# Patient Record
Sex: FEMALE | Race: WHITE | Employment: PART TIME | URBAN - NONMETROPOLITAN AREA
[De-identification: names, ages, dates, MRNs, and addresses within clinical notes are randomized per-mention and may not be internally consistent; named-entity substitution may affect disease eponyms.]

---

## 2017-07-17 ENCOUNTER — OFFICE VISIT (OUTPATIENT)
Dept: PHYSICAL MEDICINE AND REHAB | Age: 47
End: 2017-07-17
Payer: COMMERCIAL

## 2017-07-17 VITALS
HEART RATE: 66 BPM | HEIGHT: 64 IN | WEIGHT: 259 LBS | SYSTOLIC BLOOD PRESSURE: 124 MMHG | BODY MASS INDEX: 44.22 KG/M2 | DIASTOLIC BLOOD PRESSURE: 74 MMHG

## 2017-07-17 DIAGNOSIS — G35 MULTIPLE SCLEROSIS (HCC): Primary | ICD-10-CM

## 2017-07-17 PROCEDURE — 99213 OFFICE O/P EST LOW 20 MIN: CPT | Performed by: PSYCHIATRY & NEUROLOGY

## 2017-07-17 RX ORDER — GLATIRAMER 40 MG/ML
INJECTION, SOLUTION SUBCUTANEOUS
Qty: 36 SYRINGE | Refills: 3 | Status: SHIPPED | OUTPATIENT
Start: 2017-07-17 | End: 2018-07-09 | Stop reason: SDUPTHER

## 2017-11-16 ENCOUNTER — HOSPITAL ENCOUNTER (OUTPATIENT)
Dept: MAMMOGRAPHY | Age: 47
Discharge: HOME | End: 2017-11-16
Payer: COMMERCIAL

## 2017-11-16 DIAGNOSIS — Z12.39 BREAST SCREENING: ICD-10-CM

## 2017-11-16 PROCEDURE — 77063 BREAST TOMOSYNTHESIS BI: CPT

## 2017-11-29 ENCOUNTER — TELEPHONE (OUTPATIENT)
Dept: NEUROLOGY | Age: 47
End: 2017-11-29

## 2017-11-29 DIAGNOSIS — G35 MULTIPLE SCLEROSIS (HCC): Primary | ICD-10-CM

## 2017-11-29 NOTE — TELEPHONE ENCOUNTER
Patient called stating in the past you had offered her a referral for a sleep study. She said at that time she was not ready to proceed, but she feels she would like to go ahead and get it done now. Ok to do referral for sleep study? Please advise.

## 2018-03-16 ENCOUNTER — HOSPITAL ENCOUNTER (OUTPATIENT)
Age: 48
Discharge: HOME | End: 2018-03-16
Payer: COMMERCIAL

## 2018-03-16 LAB
AMORPHOUS: ABNORMAL
BACTERIA: ABNORMAL
BILIRUBIN URINE: NEGATIVE
BLOOD, URINE: NEGATIVE
CASTS UA: ABNORMAL /LPF
CHARACTER, URINE: CLEAR
COLOR: YELLOW
CRYSTALS, UA: ABNORMAL
EPITHELIAL CELLS, UA: ABNORMAL /HPF
GLUCOSE, URINE: 100 MG/DL
KETONES, URINE: NEGATIVE
LEUKOCYTE ESTERASE, URINE: ABNORMAL
MUCUS: ABNORMAL
NITRITE, URINE: NEGATIVE
PH UA: 6 (ref 5–9)
PROTEIN UA: NEGATIVE MG/DL
RBC UA: ABNORMAL /HPF
REFLEX TO URINE C & S: ABNORMAL
SPECIFIC GRAVITY UA: 1.02 (ref 1–1.03)
UROBILINOGEN, URINE: 0.2 EU/DL (ref 0–1)
WBC UA: ABNORMAL /HPF

## 2018-03-16 PROCEDURE — 81001 URINALYSIS AUTO W/SCOPE: CPT

## 2018-03-16 PROCEDURE — 87086 URINE CULTURE/COLONY COUNT: CPT

## 2018-03-17 LAB
ORGANISM: ABNORMAL
URINE CULTURE REFLEX: ABNORMAL

## 2018-05-29 RX ORDER — LISINOPRIL 20 MG/1
10 TABLET ORAL DAILY
COMMUNITY
End: 2022-04-25 | Stop reason: DRUGHIGH

## 2018-05-31 ENCOUNTER — HOSPITAL ENCOUNTER (OUTPATIENT)
Age: 48
Discharge: HOME | End: 2018-05-31
Payer: COMMERCIAL

## 2018-05-31 LAB
BASOPHILS # BLD: 0.8 %
BASOPHILS ABSOLUTE: 0.1 THOU/MM3 (ref 0–0.1)
EKG ATRIAL RATE: 60 BPM
EKG P AXIS: 58 DEGREES
EKG P-R INTERVAL: 140 MS
EKG Q-T INTERVAL: 420 MS
EKG QRS DURATION: 110 MS
EKG QTC CALCULATION (BAZETT): 420 MS
EKG R AXIS: 19 DEGREES
EKG T AXIS: 15 DEGREES
EKG VENTRICULAR RATE: 60 BPM
EOSINOPHIL # BLD: 4.8 %
EOSINOPHILS ABSOLUTE: 0.4 THOU/MM3 (ref 0–0.4)
HCT VFR BLD CALC: 41.1 % (ref 37–47)
HEMOGLOBIN: 13.9 GM/DL (ref 12–16)
LYMPHOCYTES # BLD: 34 %
LYMPHOCYTES ABSOLUTE: 2.5 THOU/MM3 (ref 1–4.8)
MCH RBC QN AUTO: 28.9 PG (ref 27–31)
MCHC RBC AUTO-ENTMCNC: 33.9 GM/DL (ref 33–37)
MCV RBC AUTO: 85.3 FL (ref 81–99)
MONOCYTES # BLD: 9.6 %
MONOCYTES ABSOLUTE: 0.7 THOU/MM3 (ref 0.4–1.3)
NUCLEATED RED BLOOD CELLS: 0 /100 WBC
PDW BLD-RTO: 13.7 % (ref 11.5–14.5)
PLATELET # BLD: 285 THOU/MM3 (ref 130–400)
PMV BLD AUTO: 8.8 FL (ref 7.4–10.4)
POTASSIUM SERPL-SCNC: 3.9 MEQ/L (ref 3.5–5.2)
RBC # BLD: 4.81 MILL/MM3 (ref 4.2–5.4)
SEG NEUTROPHILS: 50.8 %
SEGMENTED NEUTROPHILS ABSOLUTE COUNT: 3.7 THOU/MM3 (ref 1.8–7.7)
WBC # BLD: 7.3 THOU/MM3 (ref 4.8–10.8)

## 2018-05-31 PROCEDURE — 36415 COLL VENOUS BLD VENIPUNCTURE: CPT

## 2018-05-31 PROCEDURE — 85025 COMPLETE CBC W/AUTO DIFF WBC: CPT

## 2018-05-31 PROCEDURE — 84132 ASSAY OF SERUM POTASSIUM: CPT

## 2018-05-31 PROCEDURE — 93005 ELECTROCARDIOGRAM TRACING: CPT | Performed by: OBSTETRICS & GYNECOLOGY

## 2018-06-05 ENCOUNTER — ANESTHESIA EVENT (OUTPATIENT)
Dept: OPERATING ROOM | Age: 48
End: 2018-06-05
Payer: COMMERCIAL

## 2018-06-05 ENCOUNTER — ANESTHESIA (OUTPATIENT)
Dept: OPERATING ROOM | Age: 48
End: 2018-06-05
Payer: COMMERCIAL

## 2018-06-05 ENCOUNTER — APPOINTMENT (OUTPATIENT)
Dept: GENERAL RADIOLOGY | Age: 48
End: 2018-06-05
Payer: COMMERCIAL

## 2018-06-05 ENCOUNTER — HOSPITAL ENCOUNTER (OUTPATIENT)
Age: 48
Setting detail: OUTPATIENT SURGERY
Discharge: ANOTHER HOSPITAL | End: 2018-06-05
Attending: OBSTETRICS & GYNECOLOGY | Admitting: OBSTETRICS & GYNECOLOGY
Payer: COMMERCIAL

## 2018-06-05 ENCOUNTER — HOSPITAL ENCOUNTER (EMERGENCY)
Age: 48
Discharge: HOME | End: 2018-06-05
Attending: INTERNAL MEDICINE
Payer: COMMERCIAL

## 2018-06-05 VITALS
RESPIRATION RATE: 14 BRPM | SYSTOLIC BLOOD PRESSURE: 159 MMHG | DIASTOLIC BLOOD PRESSURE: 88 MMHG | HEART RATE: 70 BPM | BODY MASS INDEX: 42.68 KG/M2 | OXYGEN SATURATION: 99 % | HEIGHT: 64 IN | WEIGHT: 250 LBS | TEMPERATURE: 97.6 F

## 2018-06-05 VITALS
TEMPERATURE: 97 F | RESPIRATION RATE: 9 BRPM | DIASTOLIC BLOOD PRESSURE: 75 MMHG | WEIGHT: 251 LBS | HEART RATE: 89 BPM | SYSTOLIC BLOOD PRESSURE: 139 MMHG | BODY MASS INDEX: 42.85 KG/M2 | HEIGHT: 64 IN | OXYGEN SATURATION: 100 %

## 2018-06-05 VITALS
DIASTOLIC BLOOD PRESSURE: 85 MMHG | SYSTOLIC BLOOD PRESSURE: 157 MMHG | RESPIRATION RATE: 14 BRPM | OXYGEN SATURATION: 96 % | TEMPERATURE: 97.5 F

## 2018-06-05 DIAGNOSIS — N95.0 POSTMENOPAUSAL BLEEDING: Primary | ICD-10-CM

## 2018-06-05 DIAGNOSIS — R00.1 BRADYCARDIA: Primary | ICD-10-CM

## 2018-06-05 LAB
ANION GAP SERPL CALCULATED.3IONS-SCNC: 15 MEQ/L (ref 8–16)
BASOPHILS # BLD: 0.5 %
BASOPHILS ABSOLUTE: 0 THOU/MM3 (ref 0–0.1)
BUN BLDV-MCNC: 9 MG/DL (ref 7–22)
CALCIUM SERPL-MCNC: 8.5 MG/DL (ref 8.5–10.5)
CHLORIDE BLD-SCNC: 101 MEQ/L (ref 98–111)
CO2: 23 MEQ/L (ref 23–33)
CREAT SERPL-MCNC: 0.5 MG/DL (ref 0.4–1.2)
EKG ATRIAL RATE: 66 BPM
EKG ATRIAL RATE: 78 BPM
EKG P AXIS: 44 DEGREES
EKG P AXIS: 62 DEGREES
EKG P-R INTERVAL: 148 MS
EKG P-R INTERVAL: 154 MS
EKG Q-T INTERVAL: 424 MS
EKG Q-T INTERVAL: 424 MS
EKG QRS DURATION: 112 MS
EKG QRS DURATION: 112 MS
EKG QTC CALCULATION (BAZETT): 444 MS
EKG QTC CALCULATION (BAZETT): 483 MS
EKG R AXIS: 14 DEGREES
EKG R AXIS: 22 DEGREES
EKG T AXIS: 13 DEGREES
EKG VENTRICULAR RATE: 66 BPM
EKG VENTRICULAR RATE: 78 BPM
EOSINOPHIL # BLD: 3.4 %
EOSINOPHILS ABSOLUTE: 0.2 THOU/MM3 (ref 0–0.4)
GFR SERPL CREATININE-BSD FRML MDRD: > 90 ML/MIN/1.73M2
GLUCOSE BLD-MCNC: 165 MG/DL (ref 70–108)
HCT VFR BLD CALC: 40.2 % (ref 37–47)
HEMOGLOBIN: 13.9 GM/DL (ref 12–16)
INR BLD: 1.03 (ref 0.85–1.13)
LYMPHOCYTES # BLD: 18.9 %
LYMPHOCYTES ABSOLUTE: 1.2 THOU/MM3 (ref 1–4.8)
MCH RBC QN AUTO: 29.1 PG (ref 27–31)
MCHC RBC AUTO-ENTMCNC: 34.5 GM/DL (ref 33–37)
MCV RBC AUTO: 84.2 FL (ref 81–99)
MONOCYTES # BLD: 3.3 %
MONOCYTES ABSOLUTE: 0.2 THOU/MM3 (ref 0.4–1.3)
NUCLEATED RED BLOOD CELLS: 0 /100 WBC
OSMOLALITY CALCULATION: 279.9 MOSMOL/KG (ref 275–300)
PDW BLD-RTO: 14.1 % (ref 11.5–14.5)
PLATELET # BLD: 240 THOU/MM3 (ref 130–400)
PMV BLD AUTO: 7.8 FL (ref 7.4–10.4)
POTASSIUM SERPL-SCNC: 4.3 MEQ/L (ref 3.5–5.2)
RBC # BLD: 4.78 MILL/MM3 (ref 4.2–5.4)
SEG NEUTROPHILS: 73.9 %
SEGMENTED NEUTROPHILS ABSOLUTE COUNT: 4.9 THOU/MM3 (ref 1.8–7.7)
SODIUM BLD-SCNC: 139 MEQ/L (ref 135–145)
T4 FREE: 0.94 NG/DL (ref 0.93–1.76)
TROPONIN T: < 0.01 NG/ML
TSH SERPL DL<=0.05 MIU/L-ACNC: 8.36 UIU/ML (ref 0.4–4.2)
WBC # BLD: 6.6 THOU/MM3 (ref 4.8–10.8)

## 2018-06-05 PROCEDURE — 7100000001 HC PACU RECOVERY - ADDTL 15 MIN: Performed by: OBSTETRICS & GYNECOLOGY

## 2018-06-05 PROCEDURE — 84484 ASSAY OF TROPONIN QUANT: CPT

## 2018-06-05 PROCEDURE — 3700000000 HC ANESTHESIA ATTENDED CARE: Performed by: OBSTETRICS & GYNECOLOGY

## 2018-06-05 PROCEDURE — 93010 ELECTROCARDIOGRAM REPORT: CPT | Performed by: INTERNAL MEDICINE

## 2018-06-05 PROCEDURE — 3600000013 HC SURGERY LEVEL 3 ADDTL 15MIN: Performed by: OBSTETRICS & GYNECOLOGY

## 2018-06-05 PROCEDURE — 6360000002 HC RX W HCPCS: Performed by: NURSE ANESTHETIST, CERTIFIED REGISTERED

## 2018-06-05 PROCEDURE — 36415 COLL VENOUS BLD VENIPUNCTURE: CPT

## 2018-06-05 PROCEDURE — 6370000000 HC RX 637 (ALT 250 FOR IP): Performed by: INTERNAL MEDICINE

## 2018-06-05 PROCEDURE — 3600000003 HC SURGERY LEVEL 3 BASE: Performed by: OBSTETRICS & GYNECOLOGY

## 2018-06-05 PROCEDURE — 3700000001 HC ADD 15 MINUTES (ANESTHESIA): Performed by: OBSTETRICS & GYNECOLOGY

## 2018-06-05 PROCEDURE — 6360000002 HC RX W HCPCS

## 2018-06-05 PROCEDURE — 85025 COMPLETE CBC W/AUTO DIFF WBC: CPT

## 2018-06-05 PROCEDURE — 2500000003 HC RX 250 WO HCPCS: Performed by: NURSE ANESTHETIST, CERTIFIED REGISTERED

## 2018-06-05 PROCEDURE — 84439 ASSAY OF FREE THYROXINE: CPT

## 2018-06-05 PROCEDURE — 85610 PROTHROMBIN TIME: CPT

## 2018-06-05 PROCEDURE — 99284 EMERGENCY DEPT VISIT MOD MDM: CPT

## 2018-06-05 PROCEDURE — 2580000003 HC RX 258: Performed by: NURSE ANESTHETIST, CERTIFIED REGISTERED

## 2018-06-05 PROCEDURE — 93005 ELECTROCARDIOGRAM TRACING: CPT | Performed by: ANESTHESIOLOGY

## 2018-06-05 PROCEDURE — 88305 TISSUE EXAM BY PATHOLOGIST: CPT

## 2018-06-05 PROCEDURE — 93005 ELECTROCARDIOGRAM TRACING: CPT | Performed by: INTERNAL MEDICINE

## 2018-06-05 PROCEDURE — 80048 BASIC METABOLIC PNL TOTAL CA: CPT

## 2018-06-05 PROCEDURE — 7100000000 HC PACU RECOVERY - FIRST 15 MIN: Performed by: OBSTETRICS & GYNECOLOGY

## 2018-06-05 PROCEDURE — 71045 X-RAY EXAM CHEST 1 VIEW: CPT

## 2018-06-05 PROCEDURE — 84443 ASSAY THYROID STIM HORMONE: CPT

## 2018-06-05 RX ORDER — ONDANSETRON 2 MG/ML
8 INJECTION INTRAMUSCULAR; INTRAVENOUS EVERY 8 HOURS PRN
Status: DISCONTINUED | OUTPATIENT
Start: 2018-06-05 | End: 2018-06-05 | Stop reason: HOSPADM

## 2018-06-05 RX ORDER — FENTANYL CITRATE 50 UG/ML
50 INJECTION, SOLUTION INTRAMUSCULAR; INTRAVENOUS EVERY 5 MIN PRN
Status: DISCONTINUED | OUTPATIENT
Start: 2018-06-05 | End: 2018-06-05 | Stop reason: HOSPADM

## 2018-06-05 RX ORDER — SODIUM CHLORIDE, SODIUM LACTATE, POTASSIUM CHLORIDE, CALCIUM CHLORIDE 600; 310; 30; 20 MG/100ML; MG/100ML; MG/100ML; MG/100ML
INJECTION, SOLUTION INTRAVENOUS CONTINUOUS
Status: DISCONTINUED | OUTPATIENT
Start: 2018-06-05 | End: 2018-06-05 | Stop reason: HOSPADM

## 2018-06-05 RX ORDER — PROPOFOL 10 MG/ML
INJECTION, EMULSION INTRAVENOUS PRN
Status: DISCONTINUED | OUTPATIENT
Start: 2018-06-05 | End: 2018-06-05 | Stop reason: SDUPTHER

## 2018-06-05 RX ORDER — HYDROCODONE BITARTRATE AND ACETAMINOPHEN 5; 325 MG/1; MG/1
1 TABLET ORAL ONCE
Status: COMPLETED | OUTPATIENT
Start: 2018-06-05 | End: 2018-06-05

## 2018-06-05 RX ORDER — HYDRALAZINE HYDROCHLORIDE 20 MG/ML
5 INJECTION INTRAMUSCULAR; INTRAVENOUS EVERY 10 MIN PRN
Status: DISCONTINUED | OUTPATIENT
Start: 2018-06-05 | End: 2018-06-05 | Stop reason: HOSPADM

## 2018-06-05 RX ORDER — MEPERIDINE HYDROCHLORIDE 25 MG/ML
12.5 INJECTION INTRAMUSCULAR; INTRAVENOUS; SUBCUTANEOUS EVERY 5 MIN PRN
Status: DISCONTINUED | OUTPATIENT
Start: 2018-06-05 | End: 2018-06-05 | Stop reason: HOSPADM

## 2018-06-05 RX ORDER — KETOROLAC TROMETHAMINE 30 MG/ML
30 INJECTION, SOLUTION INTRAMUSCULAR; INTRAVENOUS ONCE
Status: DISCONTINUED | OUTPATIENT
Start: 2018-06-05 | End: 2018-06-05 | Stop reason: HOSPADM

## 2018-06-05 RX ORDER — HYDROCODONE BITARTRATE AND ACETAMINOPHEN 5; 325 MG/1; MG/1
1 TABLET ORAL EVERY 6 HOURS PRN
Qty: 10 TABLET | Refills: 0 | Status: SHIPPED | OUTPATIENT
Start: 2018-06-05 | End: 2018-06-08

## 2018-06-05 RX ORDER — DIPHENHYDRAMINE HYDROCHLORIDE 50 MG/ML
12.5 INJECTION INTRAMUSCULAR; INTRAVENOUS
Status: DISCONTINUED | OUTPATIENT
Start: 2018-06-05 | End: 2018-06-05 | Stop reason: HOSPADM

## 2018-06-05 RX ORDER — MORPHINE SULFATE 2 MG/ML
2 INJECTION, SOLUTION INTRAMUSCULAR; INTRAVENOUS EVERY 5 MIN PRN
Status: DISCONTINUED | OUTPATIENT
Start: 2018-06-05 | End: 2018-06-05 | Stop reason: HOSPADM

## 2018-06-05 RX ORDER — ONDANSETRON 2 MG/ML
INJECTION INTRAMUSCULAR; INTRAVENOUS PRN
Status: DISCONTINUED | OUTPATIENT
Start: 2018-06-05 | End: 2018-06-05 | Stop reason: SDUPTHER

## 2018-06-05 RX ORDER — ONDANSETRON 2 MG/ML
4 INJECTION INTRAMUSCULAR; INTRAVENOUS
Status: DISCONTINUED | OUTPATIENT
Start: 2018-06-05 | End: 2018-06-05 | Stop reason: HOSPADM

## 2018-06-05 RX ORDER — LABETALOL HYDROCHLORIDE 5 MG/ML
5 INJECTION, SOLUTION INTRAVENOUS EVERY 5 MIN PRN
Status: DISCONTINUED | OUTPATIENT
Start: 2018-06-05 | End: 2018-06-05 | Stop reason: HOSPADM

## 2018-06-05 RX ORDER — MIDAZOLAM HYDROCHLORIDE 1 MG/ML
INJECTION INTRAMUSCULAR; INTRAVENOUS PRN
Status: DISCONTINUED | OUTPATIENT
Start: 2018-06-05 | End: 2018-06-05 | Stop reason: SDUPTHER

## 2018-06-05 RX ORDER — SODIUM CHLORIDE 9 MG/ML
INJECTION, SOLUTION INTRAVENOUS CONTINUOUS PRN
Status: DISCONTINUED | OUTPATIENT
Start: 2018-06-05 | End: 2018-06-05 | Stop reason: SDUPTHER

## 2018-06-05 RX ORDER — LIDOCAINE HYDROCHLORIDE 20 MG/ML
INJECTION, SOLUTION EPIDURAL; INFILTRATION; INTRACAUDAL; PERINEURAL PRN
Status: DISCONTINUED | OUTPATIENT
Start: 2018-06-05 | End: 2018-06-05 | Stop reason: SDUPTHER

## 2018-06-05 RX ORDER — FENTANYL CITRATE 50 UG/ML
INJECTION, SOLUTION INTRAMUSCULAR; INTRAVENOUS PRN
Status: DISCONTINUED | OUTPATIENT
Start: 2018-06-05 | End: 2018-06-05 | Stop reason: SDUPTHER

## 2018-06-05 RX ORDER — ATROPINE SULFATE 1 MG/ML
INJECTION, SOLUTION INTRAMUSCULAR; INTRAVENOUS; SUBCUTANEOUS
Status: COMPLETED
Start: 2018-06-05 | End: 2018-06-05

## 2018-06-05 RX ADMIN — PROPOFOL 30 MG: 10 INJECTION, EMULSION INTRAVENOUS at 12:28

## 2018-06-05 RX ADMIN — HYDROCODONE BITARTRATE AND ACETAMINOPHEN 1 TABLET: 5; 325 TABLET ORAL at 15:42

## 2018-06-05 RX ADMIN — FENTANYL CITRATE 25 MCG: 50 INJECTION INTRAMUSCULAR; INTRAVENOUS at 12:21

## 2018-06-05 RX ADMIN — MIDAZOLAM HYDROCHLORIDE 2 MG: 1 INJECTION, SOLUTION INTRAMUSCULAR; INTRAVENOUS at 12:13

## 2018-06-05 RX ADMIN — PROPOFOL 140 MG: 10 INJECTION, EMULSION INTRAVENOUS at 12:15

## 2018-06-05 RX ADMIN — LIDOCAINE HYDROCHLORIDE 60 MG: 20 INJECTION, SOLUTION EPIDURAL; INFILTRATION; INTRACAUDAL; PERINEURAL at 12:15

## 2018-06-05 RX ADMIN — FENTANYL CITRATE 25 MCG: 50 INJECTION INTRAMUSCULAR; INTRAVENOUS at 12:15

## 2018-06-05 RX ADMIN — ATROPINE SULFATE 0.5 MG: 1 INJECTION, SOLUTION INTRAMUSCULAR; INTRAVENOUS; SUBCUTANEOUS at 12:50

## 2018-06-05 RX ADMIN — ONDANSETRON HYDROCHLORIDE 4 MG: 4 INJECTION, SOLUTION INTRAMUSCULAR; INTRAVENOUS at 12:34

## 2018-06-05 RX ADMIN — SODIUM CHLORIDE: 9 INJECTION, SOLUTION INTRAVENOUS at 12:12

## 2018-06-05 ASSESSMENT — PULMONARY FUNCTION TESTS
PIF_VALUE: 2
PIF_VALUE: 3
PIF_VALUE: 31
PIF_VALUE: 3
PIF_VALUE: 3
PIF_VALUE: 1
PIF_VALUE: 2
PIF_VALUE: 3
PIF_VALUE: 2
PIF_VALUE: 3
PIF_VALUE: 3
PIF_VALUE: 2
PIF_VALUE: 2
PIF_VALUE: 3
PIF_VALUE: 3
PIF_VALUE: 1
PIF_VALUE: 3
PIF_VALUE: 2
PIF_VALUE: 3
PIF_VALUE: 2
PIF_VALUE: 22

## 2018-06-05 ASSESSMENT — PAIN DESCRIPTION - DESCRIPTORS
DESCRIPTORS: CRAMPING
DESCRIPTORS: CRAMPING

## 2018-06-05 ASSESSMENT — ENCOUNTER SYMPTOMS
EYE DISCHARGE: 0
SHORTNESS OF BREATH: 0
RHINORRHEA: 0
WHEEZING: 0
NAUSEA: 0
VOMITING: 0
BACK PAIN: 0
COUGH: 0
DIARRHEA: 0
SORE THROAT: 0
ABDOMINAL PAIN: 1
EYE PAIN: 0

## 2018-06-05 ASSESSMENT — PAIN DESCRIPTION - LOCATION: LOCATION: ABDOMEN;PELVIS

## 2018-06-05 ASSESSMENT — PAIN DESCRIPTION - FREQUENCY: FREQUENCY: CONTINUOUS

## 2018-06-05 ASSESSMENT — PAIN - FUNCTIONAL ASSESSMENT: PAIN_FUNCTIONAL_ASSESSMENT: 0-10

## 2018-06-05 ASSESSMENT — PAIN DESCRIPTION - PAIN TYPE: TYPE: ACUTE PAIN

## 2018-06-05 ASSESSMENT — PAIN SCALES - GENERAL
PAINLEVEL_OUTOF10: 4
PAINLEVEL_OUTOF10: 3
PAINLEVEL_OUTOF10: 4
PAINLEVEL_OUTOF10: 4

## 2018-06-05 ASSESSMENT — PAIN DESCRIPTION - PROGRESSION: CLINICAL_PROGRESSION: GRADUALLY WORSENING

## 2018-06-05 ASSESSMENT — PAIN DESCRIPTION - ONSET: ONSET: ON-GOING

## 2018-06-11 ENCOUNTER — OFFICE VISIT (OUTPATIENT)
Dept: NEUROLOGY | Age: 48
End: 2018-06-11
Payer: COMMERCIAL

## 2018-06-11 VITALS
HEART RATE: 76 BPM | BODY MASS INDEX: 42.95 KG/M2 | DIASTOLIC BLOOD PRESSURE: 74 MMHG | SYSTOLIC BLOOD PRESSURE: 130 MMHG | WEIGHT: 251.6 LBS | HEIGHT: 64 IN

## 2018-06-11 DIAGNOSIS — G35 MULTIPLE SCLEROSIS (HCC): Primary | ICD-10-CM

## 2018-06-11 PROCEDURE — G8417 CALC BMI ABV UP PARAM F/U: HCPCS | Performed by: PSYCHIATRY & NEUROLOGY

## 2018-06-11 PROCEDURE — G8427 DOCREV CUR MEDS BY ELIG CLIN: HCPCS | Performed by: PSYCHIATRY & NEUROLOGY

## 2018-06-11 PROCEDURE — 3017F COLORECTAL CA SCREEN DOC REV: CPT | Performed by: PSYCHIATRY & NEUROLOGY

## 2018-06-11 PROCEDURE — 99213 OFFICE O/P EST LOW 20 MIN: CPT | Performed by: PSYCHIATRY & NEUROLOGY

## 2018-06-11 PROCEDURE — 1036F TOBACCO NON-USER: CPT | Performed by: PSYCHIATRY & NEUROLOGY

## 2018-06-18 ENCOUNTER — HOSPITAL ENCOUNTER (OUTPATIENT)
Dept: NON INVASIVE DIAGNOSTICS | Age: 48
Discharge: HOME | End: 2018-06-18
Payer: COMMERCIAL

## 2018-06-18 PROCEDURE — 93225 XTRNL ECG REC<48 HRS REC: CPT

## 2018-06-18 PROCEDURE — 93226 XTRNL ECG REC<48 HR SCAN A/R: CPT

## 2018-07-09 RX ORDER — GLATIRAMER 40 MG/ML
INJECTION, SOLUTION SUBCUTANEOUS
Qty: 36 SYRINGE | Refills: 3 | Status: SHIPPED | OUTPATIENT
Start: 2018-07-09 | End: 2019-07-05 | Stop reason: SDUPTHER

## 2018-08-22 ENCOUNTER — HOSPITAL ENCOUNTER (OUTPATIENT)
Age: 48
Discharge: HOME | End: 2018-08-22
Payer: COMMERCIAL

## 2018-08-22 LAB
AVERAGE GLUCOSE: 213 MG/DL (ref 70–126)
HBA1C MFR BLD: 9.1 % (ref 4.4–6.4)
T4 FREE: 1.22 NG/DL (ref 0.93–1.76)
TSH SERPL DL<=0.05 MIU/L-ACNC: 3.16 UIU/ML (ref 0.4–4.2)

## 2018-08-22 PROCEDURE — 84443 ASSAY THYROID STIM HORMONE: CPT

## 2018-08-22 PROCEDURE — 83036 HEMOGLOBIN GLYCOSYLATED A1C: CPT

## 2018-08-22 PROCEDURE — 36415 COLL VENOUS BLD VENIPUNCTURE: CPT

## 2018-08-22 PROCEDURE — 84439 ASSAY OF FREE THYROXINE: CPT

## 2018-10-01 ENCOUNTER — OFFICE VISIT (OUTPATIENT)
Dept: INTERNAL MEDICINE CLINIC | Age: 48
End: 2018-10-01
Payer: COMMERCIAL

## 2018-10-01 VITALS — BODY MASS INDEX: 40.55 KG/M2 | WEIGHT: 236.25 LBS

## 2018-10-01 DIAGNOSIS — E11.9 TYPE 2 DIABETES MELLITUS WITHOUT COMPLICATION, WITHOUT LONG-TERM CURRENT USE OF INSULIN (HCC): ICD-10-CM

## 2018-10-01 PROCEDURE — G0108 DIAB MANAGE TRN  PER INDIV: HCPCS | Performed by: INTERNAL MEDICINE

## 2018-10-01 PROCEDURE — 99999 PR OFFICE/OUTPT VISIT,PROCEDURE ONLY: CPT | Performed by: INTERNAL MEDICINE

## 2018-10-01 ASSESSMENT — PROBLEM AREAS IN DIABETES QUESTIONNAIRE (PAID): FEELING DEPRESSED WHEN YOU THINK ABOUT LIVING WITH DIABETES: 2

## 2018-10-25 ENCOUNTER — OFFICE VISIT (OUTPATIENT)
Dept: INTERNAL MEDICINE CLINIC | Age: 48
End: 2018-10-25
Payer: COMMERCIAL

## 2018-10-25 VITALS — WEIGHT: 226.38 LBS | BODY MASS INDEX: 38.86 KG/M2

## 2018-10-25 DIAGNOSIS — E11.9 TYPE 2 DIABETES MELLITUS WITHOUT COMPLICATION, WITHOUT LONG-TERM CURRENT USE OF INSULIN (HCC): ICD-10-CM

## 2018-10-25 PROCEDURE — 99999 PR OFFICE/OUTPT VISIT,PROCEDURE ONLY: CPT | Performed by: INTERNAL MEDICINE

## 2018-10-25 PROCEDURE — G0108 DIAB MANAGE TRN  PER INDIV: HCPCS | Performed by: REGISTERED NURSE

## 2018-10-25 NOTE — PROGRESS NOTES
[facility]  [address] [facility], [address]  [phone] (phone)  [phone] (fax)    Patient ID: Dayron Cantrell 1970  Referring Provider: Dr. Dread Joel     Patient's name and  were verified. Subjective:    She presents for Her follow-up diabetic visit. She has type 2 diabetes mellitus. Home regimen includes: biguanide She is compliant most of the time. Assessment:     Lab Results   Component Value Date    LABA1C 9.1 2018    BUN 9 2018    CREATININE 0.5 2018     Vitals:    10/25/18 1303   Weight: 226 lb 6 oz (102.7 kg)     Wt Readings from Last 3 Encounters:   10/25/18 226 lb 6 oz (102.7 kg)   10/01/18 236 lb 4 oz (107.2 kg)   18 251 lb 9.6 oz (114.1 kg)     Ht Readings from Last 3 Encounters:   18 5' 4\" (1.626 m)   18 5' 4\" (1.626 m)   18 5' 4\" (1.626 m)       Glucose at 3 hrs PPD today resulted at 105mg/dl  Lost another 10lbs this month. A1c at 6.1% per patient in PCP office. Has received flu shot. Needs to schedule eye exam.  Patient presented for the [facility]  education class. The content was presented via PowerPoint, lecture and case-based format covering the following concepts: 60mins education. · What is Diabetes? · Define glycosolation  · Interpretation of the A1C and goal.  · Pancreatic function  · Target organs and macro and microvascular complications  · Goals for SGM BP goals  · Meal clearance   · S&S hyper/hypoglycemia and tx   · Insulin physiology-basal/bolus  · Navigating sick days stress  · Relationship between diet, exercise, meds and stress  ·  Utilizing dilip care system at appropriate time  · Assuming responsibility in self management  · Troubleshooting patterns        DSME PLAN:   Discussed general issues about diabetes pathophysiology and management.   Counseling at today's visit: discussed the need for weight loss, focused on the need for regular aerobic exercise, focused on the need to adhere

## 2018-11-19 ENCOUNTER — OFFICE VISIT (OUTPATIENT)
Dept: INTERNAL MEDICINE CLINIC | Age: 48
End: 2018-11-19
Payer: COMMERCIAL

## 2018-11-19 VITALS — HEIGHT: 64 IN | BODY MASS INDEX: 38.24 KG/M2 | WEIGHT: 224 LBS

## 2018-11-19 DIAGNOSIS — E11.9 TYPE 2 DIABETES MELLITUS WITHOUT COMPLICATION, UNSPECIFIED WHETHER LONG TERM INSULIN USE (HCC): ICD-10-CM

## 2018-11-19 PROCEDURE — 99999 PR OFFICE/OUTPT VISIT,PROCEDURE ONLY: CPT | Performed by: DIETITIAN, REGISTERED

## 2018-11-19 PROCEDURE — 97802 MEDICAL NUTRITION INDIV IN: CPT | Performed by: DIETITIAN, REGISTERED

## 2018-11-19 NOTE — PATIENT INSTRUCTIONS
1. )  Good job in your weight loss efforts so far.  2.)  Read the nutrition facts label for serving size and total carbohydrates. - Without a label refer to your carb counting guide booklet. 3.)  Measure foods for accuracy in carb counting.  4.)  Your fat budget <50 gms/day - to help with your weight loss efforts  5.)  Keep exercising and keep active. Bring a one week food log and bring meter to next dietitian/RN appt.

## 2018-11-19 NOTE — PROGRESS NOTES
tablet Take 1,500 mg by mouth every evening       Glatiramer Acetate (COPAXONE) 40 MG/ML SOSY INJECT 40 MG UNDER THE SKIN THREE TIMES A WEEK 36 Syringe 3    lisinopril (PRINIVIL;ZESTRIL) 20 MG tablet Take 20 mg by mouth daily      amLODIPine (NORVASC) 5 MG tablet Take 5 mg by mouth nightly 1/2 tab am 1/2 tab pm      Azelaic Acid (FINACEA) 15 % GEL Apply  topically See Admin Instructions.  EVENING PRIMROSE OIL PO Take  by mouth daily.  Calcium Carbonate-Vitamin D (CALCIUM + D) 600-200 MG-UNIT TABS Take 3 tablets by mouth daily.  citalopram (CELEXA) 20 MG tablet Take 20 mg by mouth daily.  levothyroxine (SYNTHROID) 75 MCG tablet Take 75 mcg by mouth daily. No current facility-administered medications on file prior to visit. Vitals from current and previous visits:  Ht 5' 4\" (1.626 m)   Wt 224 lb (101.6 kg)   BMI 38.45 kg/m²    Wt hx:  Per epic flowsheet - 6/11/8 wt.251#         - 10/25/18 Wt. 226#  -Body mass index is 38.45 kg/m². 35-39.9 - Obesity Grade II.   -Weight goal: lose weight. Nutrition Diagnosis:   Food and nutrition-related knowledge deficit related to Lack of previous MNT/currently undergoing MNT as evidenced by Conditions associated with a diagnosis or treatment: Type II DB. Intervention:  -Impression: Pt attended her dietitian appt with her 23 yr old daughter. Pt. Stated she has MS. Pt cooks each night for supper - her family expects this. Household includes , daughter and patient's mother. Pt has done well in achieving weight loss.     -Instructed the patient on: Carbohydrate Counting, Consistent Carbohydrate Intake, Exchange System for Carbohydrate Counting & Meal Planning, Meal Planning for Regular, Balanced Meals & Snacks and The Importance of Regular Physical Activity.  -Handouts given for: carbohydrate counting, food logging, healthy snacks, 150 gm 3-day sample menus and fancy plate pictures, brkf ideas, good fats vs bad

## 2018-11-19 NOTE — COMMUNICATION BODY
Assessment:     Vitals from current and previous visits:  Ht 5' 4\" (1.626 m)   Wt 224 lb (101.6 kg)   BMI 38.45 kg/m²     Wt hx:  Per epic flowsheet - 6/11/8 wt.251#         - 10/25/18 Wt. 226#  -Body mass index is 38.45 kg/m². 35-39.9 - Obesity Grade II.   -Weight goal: lose weight. Nutrition Diagnosis:   Food and nutrition-related knowledge deficit related to Lack of previous MNT/currently undergoing MNT as evidenced by Conditions associated with a diagnosis or treatment: Type II DB. Plan:     Intervention:  -Impression: Pt attended her dietitian appt with her 23 yr old daughter. Pt. Stated she has MS. Pt cooks each night for supper - her family expects this. Household includes , daughter and patient's mother. Pt has done well in achieving weight loss. -Instructed the patient on: Carbohydrate Counting, Consistent Carbohydrate Intake, Exchange System for Carbohydrate Counting & Meal Planning, Meal Planning for Regular, Balanced Meals & Snacks and The Importance of Regular Physical Activity.  -Handouts given for: carbohydrate counting, food logging, healthy snacks, 150 gm 3-day sample menus and fancy plate pictures, brkf ideas, good fats vs bad fats. Patient Instructions   1.)  Good job in your weight loss efforts so far.  2.)  Read the nutrition facts label for serving size and total carbohydrates. - Without a label refer to your carb counting guide booklet. 3.)  Measure foods for accuracy in carb counting.  4.)  Your fat budget <50 gms/day - to help with your weight loss efforts  5.)  Keep exercising and keep active. Bring a one week food log and bring meter to next dietitian/RN appt.     -General Diet Recommendations: low fat, low cholesterol, substitute healthy fats, such as olive oil, canola oil, grapeseed oil for saturated fats like butter, margarine, and shortening, increase fiber intake and carb counting and balance meal planning.  -Nutrition prescription: 1400 calories/day,

## 2019-02-18 ENCOUNTER — OFFICE VISIT (OUTPATIENT)
Dept: NEUROLOGY | Age: 49
End: 2019-02-18
Payer: COMMERCIAL

## 2019-02-18 VITALS
DIASTOLIC BLOOD PRESSURE: 82 MMHG | BODY MASS INDEX: 39.69 KG/M2 | SYSTOLIC BLOOD PRESSURE: 126 MMHG | HEIGHT: 63 IN | WEIGHT: 224 LBS | HEART RATE: 66 BPM

## 2019-02-18 DIAGNOSIS — G35 MULTIPLE SCLEROSIS (HCC): Primary | ICD-10-CM

## 2019-02-18 PROCEDURE — 1036F TOBACCO NON-USER: CPT | Performed by: PSYCHIATRY & NEUROLOGY

## 2019-02-18 PROCEDURE — G8417 CALC BMI ABV UP PARAM F/U: HCPCS | Performed by: PSYCHIATRY & NEUROLOGY

## 2019-02-18 PROCEDURE — 99213 OFFICE O/P EST LOW 20 MIN: CPT | Performed by: PSYCHIATRY & NEUROLOGY

## 2019-02-18 PROCEDURE — 3017F COLORECTAL CA SCREEN DOC REV: CPT | Performed by: PSYCHIATRY & NEUROLOGY

## 2019-02-18 PROCEDURE — G8427 DOCREV CUR MEDS BY ELIG CLIN: HCPCS | Performed by: PSYCHIATRY & NEUROLOGY

## 2019-02-18 PROCEDURE — G8484 FLU IMMUNIZE NO ADMIN: HCPCS | Performed by: PSYCHIATRY & NEUROLOGY

## 2019-02-28 ENCOUNTER — HOSPITAL ENCOUNTER (OUTPATIENT)
Age: 49
Discharge: HOME | End: 2019-02-28
Payer: COMMERCIAL

## 2019-02-28 DIAGNOSIS — G35 MULTIPLE SCLEROSIS (HCC): ICD-10-CM

## 2019-02-28 LAB
VITAMIN B-12: 448 PG/ML (ref 211–911)
VITAMIN D 25-HYDROXY: 35 NG/ML (ref 30–100)

## 2019-02-28 PROCEDURE — 82607 VITAMIN B-12: CPT

## 2019-02-28 PROCEDURE — 82306 VITAMIN D 25 HYDROXY: CPT

## 2019-02-28 PROCEDURE — 36415 COLL VENOUS BLD VENIPUNCTURE: CPT

## 2019-03-12 ENCOUNTER — TELEPHONE (OUTPATIENT)
Dept: NEUROLOGY | Age: 49
End: 2019-03-12

## 2019-03-12 DIAGNOSIS — G35 MULTIPLE SCLEROSIS (HCC): Primary | ICD-10-CM

## 2019-04-23 ENCOUNTER — HOSPITAL ENCOUNTER (OUTPATIENT)
Age: 49
End: 2019-04-23
Payer: COMMERCIAL

## 2019-05-07 ENCOUNTER — TELEPHONE (OUTPATIENT)
Dept: INTERNAL MEDICINE CLINIC | Age: 49
End: 2019-05-07

## 2019-05-07 NOTE — TELEPHONE ENCOUNTER
Patient canceled appointment for 5/6/19. I called and left message asking for a return call to reschedule.  Sent letter

## 2019-05-07 NOTE — PROGRESS NOTES
PAT call attempted patient unavailable left message with instructions    NPO after midnight  Bring insurance info and drivers license  Wear comfortable clean clothing  Do not bring jewelry   Shower night before and morning of surgery with a liquid antibacterial soap  Bring list of medications with dosage and how often taken  Follow all instructions given by your physician   needed at discharge  Call [phone] for any questions

## 2019-05-08 NOTE — PROGRESS NOTES
Called Dr. Tyler Corona office and spoke with Rajeev Cooley requested copy of H&H and potassium.  She said they are ordered, patient hasn't completed yet

## 2019-05-10 ENCOUNTER — HOSPITAL ENCOUNTER (OUTPATIENT)
Age: 49
Discharge: HOME | End: 2019-05-10
Payer: COMMERCIAL

## 2019-05-10 LAB
ABO: NORMAL
ANTIBODY SCREEN: NORMAL
BASOPHILS # BLD: 0.9 %
BASOPHILS ABSOLUTE: 0 THOU/MM3 (ref 0–0.1)
EOSINOPHIL # BLD: 2.8 %
EOSINOPHILS ABSOLUTE: 0.2 THOU/MM3 (ref 0–0.4)
ERYTHROCYTE [DISTWIDTH] IN BLOOD BY AUTOMATED COUNT: 12.9 % (ref 11.5–14.5)
ERYTHROCYTE [DISTWIDTH] IN BLOOD BY AUTOMATED COUNT: 42.5 FL (ref 35–45)
HCT VFR BLD CALC: 43 % (ref 37–47)
HEMOGLOBIN: 13.7 GM/DL (ref 12–16)
IMMATURE GRANS (ABS): 0 THOU/MM3 (ref 0–0.07)
IMMATURE GRANULOCYTES: 0 %
LYMPHOCYTES # BLD: 35.6 %
LYMPHOCYTES ABSOLUTE: 1.9 THOU/MM3 (ref 1–4.8)
MCH RBC QN AUTO: 28.5 PG (ref 26–33)
MCHC RBC AUTO-ENTMCNC: 31.9 GM/DL (ref 32.2–35.5)
MCV RBC AUTO: 89.4 FL (ref 81–99)
MONOCYTES # BLD: 12.7 %
MONOCYTES ABSOLUTE: 0.7 THOU/MM3 (ref 0.4–1.3)
NUCLEATED RED BLOOD CELLS: 0 /100 WBC
PLATELET # BLD: 294 THOU/MM3 (ref 130–400)
PMV BLD AUTO: 9.8 FL (ref 9.4–12.4)
POTASSIUM SERPL-SCNC: 4 MEQ/L (ref 3.5–5.2)
RBC # BLD: 4.81 MILL/MM3 (ref 4.2–5.4)
RH FACTOR: NORMAL
SEG NEUTROPHILS: 48 %
SEGMENTED NEUTROPHILS ABSOLUTE COUNT: 2.6 THOU/MM3 (ref 1.8–7.7)
WBC # BLD: 5.4 THOU/MM3 (ref 4.8–10.8)

## 2019-05-10 PROCEDURE — 84132 ASSAY OF SERUM POTASSIUM: CPT

## 2019-05-10 PROCEDURE — 86900 BLOOD TYPING SEROLOGIC ABO: CPT

## 2019-05-10 PROCEDURE — 86901 BLOOD TYPING SEROLOGIC RH(D): CPT

## 2019-05-10 PROCEDURE — 36415 COLL VENOUS BLD VENIPUNCTURE: CPT

## 2019-05-10 PROCEDURE — 86850 RBC ANTIBODY SCREEN: CPT

## 2019-05-10 PROCEDURE — 85025 COMPLETE CBC W/AUTO DIFF WBC: CPT

## 2019-05-13 NOTE — PROGRESS NOTES
NPO after midnight  [employer] and drivers license  Wear comfortable clean clothing  Do not bring jewelry   Shower night before and morning of surgery with a liquid antibacterial soap  Bring medications in original bottles  Follow all instructions given by your physician   needed at discharge  Call [phone] for any questions    In preparation for their surgical procedure above patient was screened for Obstructive Sleep Apnea (REJI) using the STOP-Bang Questionnaire by the [place] [address] department. This is a pre-surgical screening tool for patient safety and serves as a recommendation, this WILL NOT cause cancellation of surgery. STOP-Bang Questionnaire  * Do you currently see a pulmonologist?  No     If yes STOP, do not complete. Patient follows with Dr.     1.  Do you snore loudly (able to be heard in the next room)? No    2. Do you often feel tired or sleepy during the daytime? No       3. Has anyone ever told you that you stop breathing during your sleep? No    4. Do you have or are you being treated for high blood pressure? Yes      5. BMI more than 35? BMI (Calculated): 34.4        No    6. Age over 48 years? 44 y.o. Yes    7. Neck Circumference greater than 17 inches for male or 16 inches for female? Measured           (visits only)            Not Applicable    8. Gender Male? No      TOTAL SCORE: 2    REJI - Low Risk : Yes to 0 - 2 questions  REJI - Intermediate Risk : Yes to 3 - 4 questions  REJI - High Risk : Yes to 5 - 8 questions    Adapted from:   STOP Questionnaire: A Tool to Screen Patients for Obstructive Sleep Apnea   Bhumi Aldridge F.R.C.P.C., LINDY Miller.B.B.S., Florina Jaquez M.D., Rachel Monge. Freddy Teixeira, Ph.D., LINDY Penaloza Ala.B.B.S., Nesha Marie M.Sc., Mundo Meyer M.D., Vikki Flores. Grayson Schmidt F.R.C.P.C.    Anesthesiology 2008; [id number] Copyright 2008, the [employer]ologists, [address].   ----------------------------------------------------------------------------------------------------------------

## 2019-05-14 ENCOUNTER — ANESTHESIA (OUTPATIENT)
Dept: OPERATING ROOM | Age: 49
End: 2019-05-14
Payer: COMMERCIAL

## 2019-05-14 ENCOUNTER — ANESTHESIA EVENT (OUTPATIENT)
Dept: OPERATING ROOM | Age: 49
End: 2019-05-14
Payer: COMMERCIAL

## 2019-05-14 ENCOUNTER — HOSPITAL ENCOUNTER (OUTPATIENT)
Age: 49
Setting detail: OUTPATIENT SURGERY
Discharge: HOME | End: 2019-05-14
Attending: OBSTETRICS & GYNECOLOGY | Admitting: OBSTETRICS & GYNECOLOGY
Payer: COMMERCIAL

## 2019-05-14 VITALS — OXYGEN SATURATION: 100 % | DIASTOLIC BLOOD PRESSURE: 65 MMHG | SYSTOLIC BLOOD PRESSURE: 115 MMHG | TEMPERATURE: 95.5 F

## 2019-05-14 VITALS
SYSTOLIC BLOOD PRESSURE: 100 MMHG | WEIGHT: 203.2 LBS | BODY MASS INDEX: 34.69 KG/M2 | OXYGEN SATURATION: 95 % | RESPIRATION RATE: 16 BRPM | HEART RATE: 53 BPM | DIASTOLIC BLOOD PRESSURE: 52 MMHG | TEMPERATURE: 97.4 F | HEIGHT: 64 IN

## 2019-05-14 DIAGNOSIS — N95.0 POSTMENOPAUSAL BLEEDING: Primary | ICD-10-CM

## 2019-05-14 LAB — GLUCOSE BLD-MCNC: 99 MG/DL (ref 70–108)

## 2019-05-14 PROCEDURE — 2500000003 HC RX 250 WO HCPCS: Performed by: NURSE ANESTHETIST, CERTIFIED REGISTERED

## 2019-05-14 PROCEDURE — 2580000003 HC RX 258: Performed by: OBSTETRICS & GYNECOLOGY

## 2019-05-14 PROCEDURE — 7100000000 HC PACU RECOVERY - FIRST 15 MIN: Performed by: OBSTETRICS & GYNECOLOGY

## 2019-05-14 PROCEDURE — 2500000003 HC RX 250 WO HCPCS: Performed by: OBSTETRICS & GYNECOLOGY

## 2019-05-14 PROCEDURE — 2709999900 HC NON-CHARGEABLE SUPPLY: Performed by: OBSTETRICS & GYNECOLOGY

## 2019-05-14 PROCEDURE — S2900 ROBOTIC SURGICAL SYSTEM: HCPCS | Performed by: OBSTETRICS & GYNECOLOGY

## 2019-05-14 PROCEDURE — 6370000000 HC RX 637 (ALT 250 FOR IP): Performed by: OBSTETRICS & GYNECOLOGY

## 2019-05-14 PROCEDURE — 3600000019 HC SURGERY ROBOT ADDTL 15MIN: Performed by: OBSTETRICS & GYNECOLOGY

## 2019-05-14 PROCEDURE — 3600000009 HC SURGERY ROBOT BASE: Performed by: OBSTETRICS & GYNECOLOGY

## 2019-05-14 PROCEDURE — 3700000000 HC ANESTHESIA ATTENDED CARE: Performed by: OBSTETRICS & GYNECOLOGY

## 2019-05-14 PROCEDURE — 6360000002 HC RX W HCPCS: Performed by: ANESTHESIOLOGY

## 2019-05-14 PROCEDURE — 3700000001 HC ADD 15 MINUTES (ANESTHESIA): Performed by: OBSTETRICS & GYNECOLOGY

## 2019-05-14 PROCEDURE — 7100000011 HC PHASE II RECOVERY - ADDTL 15 MIN: Performed by: OBSTETRICS & GYNECOLOGY

## 2019-05-14 PROCEDURE — 6360000002 HC RX W HCPCS: Performed by: NURSE ANESTHETIST, CERTIFIED REGISTERED

## 2019-05-14 PROCEDURE — 88307 TISSUE EXAM BY PATHOLOGIST: CPT

## 2019-05-14 PROCEDURE — 82948 REAGENT STRIP/BLOOD GLUCOSE: CPT

## 2019-05-14 PROCEDURE — 7100000010 HC PHASE II RECOVERY - FIRST 15 MIN: Performed by: OBSTETRICS & GYNECOLOGY

## 2019-05-14 PROCEDURE — 7100000001 HC PACU RECOVERY - ADDTL 15 MIN: Performed by: OBSTETRICS & GYNECOLOGY

## 2019-05-14 RX ORDER — FENTANYL CITRATE 50 UG/ML
50 INJECTION, SOLUTION INTRAMUSCULAR; INTRAVENOUS EVERY 5 MIN PRN
Status: DISCONTINUED | OUTPATIENT
Start: 2019-05-14 | End: 2019-05-14 | Stop reason: HOSPADM

## 2019-05-14 RX ORDER — SODIUM CHLORIDE, SODIUM LACTATE, POTASSIUM CHLORIDE, CALCIUM CHLORIDE 600; 310; 30; 20 MG/100ML; MG/100ML; MG/100ML; MG/100ML
INJECTION, SOLUTION INTRAVENOUS CONTINUOUS
Status: DISCONTINUED | OUTPATIENT
Start: 2019-05-14 | End: 2019-05-14 | Stop reason: HOSPADM

## 2019-05-14 RX ORDER — FENTANYL CITRATE 50 UG/ML
INJECTION, SOLUTION INTRAMUSCULAR; INTRAVENOUS PRN
Status: DISCONTINUED | OUTPATIENT
Start: 2019-05-14 | End: 2019-05-14 | Stop reason: SDUPTHER

## 2019-05-14 RX ORDER — OXYCODONE HYDROCHLORIDE AND ACETAMINOPHEN 5; 325 MG/1; MG/1
1 TABLET ORAL EVERY 6 HOURS PRN
Qty: 28 TABLET | Refills: 0 | Status: SHIPPED | OUTPATIENT
Start: 2019-05-14 | End: 2019-05-21

## 2019-05-14 RX ORDER — GLYCOPYRROLATE 1 MG/5 ML
SYRINGE (ML) INTRAVENOUS PRN
Status: DISCONTINUED | OUTPATIENT
Start: 2019-05-14 | End: 2019-05-14 | Stop reason: SDUPTHER

## 2019-05-14 RX ORDER — LIDOCAINE HYDROCHLORIDE 20 MG/ML
INJECTION, SOLUTION EPIDURAL; INFILTRATION; INTRACAUDAL; PERINEURAL PRN
Status: DISCONTINUED | OUTPATIENT
Start: 2019-05-14 | End: 2019-05-14 | Stop reason: SDUPTHER

## 2019-05-14 RX ORDER — DEXAMETHASONE SODIUM PHOSPHATE 4 MG/ML
INJECTION, SOLUTION INTRA-ARTICULAR; INTRALESIONAL; INTRAMUSCULAR; INTRAVENOUS; SOFT TISSUE PRN
Status: DISCONTINUED | OUTPATIENT
Start: 2019-05-14 | End: 2019-05-14 | Stop reason: SDUPTHER

## 2019-05-14 RX ORDER — DIPHENHYDRAMINE HYDROCHLORIDE 50 MG/ML
12.5 INJECTION INTRAMUSCULAR; INTRAVENOUS
Status: DISCONTINUED | OUTPATIENT
Start: 2019-05-14 | End: 2019-05-14 | Stop reason: HOSPADM

## 2019-05-14 RX ORDER — SODIUM CHLORIDE 0.9 % (FLUSH) 0.9 %
10 SYRINGE (ML) INJECTION PRN
Status: DISCONTINUED | OUTPATIENT
Start: 2019-05-14 | End: 2019-05-14 | Stop reason: HOSPADM

## 2019-05-14 RX ORDER — BUPIVACAINE HYDROCHLORIDE 5 MG/ML
INJECTION, SOLUTION EPIDURAL; INTRACAUDAL PRN
Status: DISCONTINUED | OUTPATIENT
Start: 2019-05-14 | End: 2019-05-14 | Stop reason: ALTCHOICE

## 2019-05-14 RX ORDER — KETOROLAC TROMETHAMINE 30 MG/ML
30 INJECTION, SOLUTION INTRAMUSCULAR; INTRAVENOUS EVERY 6 HOURS
Status: DISCONTINUED | OUTPATIENT
Start: 2019-05-14 | End: 2019-05-14 | Stop reason: HOSPADM

## 2019-05-14 RX ORDER — ONDANSETRON 2 MG/ML
INJECTION INTRAMUSCULAR; INTRAVENOUS PRN
Status: DISCONTINUED | OUTPATIENT
Start: 2019-05-14 | End: 2019-05-14 | Stop reason: SDUPTHER

## 2019-05-14 RX ORDER — MEPERIDINE HYDROCHLORIDE 25 MG/ML
12.5 INJECTION INTRAMUSCULAR; INTRAVENOUS; SUBCUTANEOUS EVERY 5 MIN PRN
Status: DISCONTINUED | OUTPATIENT
Start: 2019-05-14 | End: 2019-05-14 | Stop reason: HOSPADM

## 2019-05-14 RX ORDER — MORPHINE SULFATE 2 MG/ML
2 INJECTION, SOLUTION INTRAMUSCULAR; INTRAVENOUS EVERY 5 MIN PRN
Status: DISCONTINUED | OUTPATIENT
Start: 2019-05-14 | End: 2019-05-14 | Stop reason: HOSPADM

## 2019-05-14 RX ORDER — NEOSTIGMINE METHYLSULFATE 5 MG/5 ML
SYRINGE (ML) INTRAVENOUS PRN
Status: DISCONTINUED | OUTPATIENT
Start: 2019-05-14 | End: 2019-05-14 | Stop reason: SDUPTHER

## 2019-05-14 RX ORDER — OXYCODONE HYDROCHLORIDE AND ACETAMINOPHEN 5; 325 MG/1; MG/1
1 TABLET ORAL EVERY 4 HOURS PRN
Status: DISCONTINUED | OUTPATIENT
Start: 2019-05-14 | End: 2019-05-14 | Stop reason: HOSPADM

## 2019-05-14 RX ORDER — KETOROLAC TROMETHAMINE 30 MG/ML
INJECTION, SOLUTION INTRAMUSCULAR; INTRAVENOUS PRN
Status: DISCONTINUED | OUTPATIENT
Start: 2019-05-14 | End: 2019-05-14 | Stop reason: SDUPTHER

## 2019-05-14 RX ORDER — IBUPROFEN 800 MG/1
800 TABLET ORAL EVERY 8 HOURS PRN
Status: DISCONTINUED | OUTPATIENT
Start: 2019-05-14 | End: 2019-05-14 | Stop reason: HOSPADM

## 2019-05-14 RX ORDER — ROCURONIUM BROMIDE 10 MG/ML
INJECTION, SOLUTION INTRAVENOUS PRN
Status: DISCONTINUED | OUTPATIENT
Start: 2019-05-14 | End: 2019-05-14 | Stop reason: SDUPTHER

## 2019-05-14 RX ORDER — ONDANSETRON 2 MG/ML
4 INJECTION INTRAMUSCULAR; INTRAVENOUS
Status: DISCONTINUED | OUTPATIENT
Start: 2019-05-14 | End: 2019-05-14 | Stop reason: HOSPADM

## 2019-05-14 RX ORDER — LABETALOL HYDROCHLORIDE 5 MG/ML
5 INJECTION, SOLUTION INTRAVENOUS EVERY 5 MIN PRN
Status: DISCONTINUED | OUTPATIENT
Start: 2019-05-14 | End: 2019-05-14 | Stop reason: HOSPADM

## 2019-05-14 RX ORDER — ACETAMINOPHEN 325 MG/1
650 TABLET ORAL EVERY 4 HOURS PRN
Status: DISCONTINUED | OUTPATIENT
Start: 2019-05-14 | End: 2019-05-14 | Stop reason: HOSPADM

## 2019-05-14 RX ORDER — HYDRALAZINE HYDROCHLORIDE 20 MG/ML
5 INJECTION INTRAMUSCULAR; INTRAVENOUS EVERY 10 MIN PRN
Status: DISCONTINUED | OUTPATIENT
Start: 2019-05-14 | End: 2019-05-14 | Stop reason: HOSPADM

## 2019-05-14 RX ORDER — SODIUM CHLORIDE 0.9 % (FLUSH) 0.9 %
10 SYRINGE (ML) INJECTION EVERY 12 HOURS SCHEDULED
Status: DISCONTINUED | OUTPATIENT
Start: 2019-05-14 | End: 2019-05-14 | Stop reason: HOSPADM

## 2019-05-14 RX ORDER — OXYCODONE HYDROCHLORIDE AND ACETAMINOPHEN 5; 325 MG/1; MG/1
2 TABLET ORAL EVERY 4 HOURS PRN
Status: DISCONTINUED | OUTPATIENT
Start: 2019-05-14 | End: 2019-05-14 | Stop reason: HOSPADM

## 2019-05-14 RX ORDER — CEFAZOLIN SODIUM 1 G/3ML
INJECTION, POWDER, FOR SOLUTION INTRAMUSCULAR; INTRAVENOUS PRN
Status: DISCONTINUED | OUTPATIENT
Start: 2019-05-14 | End: 2019-05-14 | Stop reason: SDUPTHER

## 2019-05-14 RX ORDER — ONDANSETRON 2 MG/ML
8 INJECTION INTRAMUSCULAR; INTRAVENOUS EVERY 8 HOURS PRN
Status: DISCONTINUED | OUTPATIENT
Start: 2019-05-14 | End: 2019-05-14 | Stop reason: HOSPADM

## 2019-05-14 RX ORDER — PROPOFOL 10 MG/ML
INJECTION, EMULSION INTRAVENOUS PRN
Status: DISCONTINUED | OUTPATIENT
Start: 2019-05-14 | End: 2019-05-14 | Stop reason: SDUPTHER

## 2019-05-14 RX ORDER — MIDAZOLAM HYDROCHLORIDE 1 MG/ML
INJECTION INTRAMUSCULAR; INTRAVENOUS PRN
Status: DISCONTINUED | OUTPATIENT
Start: 2019-05-14 | End: 2019-05-14 | Stop reason: SDUPTHER

## 2019-05-14 RX ORDER — EPHEDRINE SULFATE/0.9% NACL/PF 50 MG/5 ML
SYRINGE (ML) INTRAVENOUS PRN
Status: DISCONTINUED | OUTPATIENT
Start: 2019-05-14 | End: 2019-05-14 | Stop reason: SDUPTHER

## 2019-05-14 RX ADMIN — Medication 10 MG: at 09:22

## 2019-05-14 RX ADMIN — Medication 0.2 MG: at 09:19

## 2019-05-14 RX ADMIN — MORPHINE SULFATE 2 MG: 2 INJECTION, SOLUTION INTRAMUSCULAR; INTRAVENOUS at 10:46

## 2019-05-14 RX ADMIN — SODIUM CHLORIDE, POTASSIUM CHLORIDE, SODIUM LACTATE AND CALCIUM CHLORIDE: 600; 310; 30; 20 INJECTION, SOLUTION INTRAVENOUS at 08:46

## 2019-05-14 RX ADMIN — ONDANSETRON HYDROCHLORIDE 4 MG: 4 INJECTION, SOLUTION INTRAMUSCULAR; INTRAVENOUS at 10:09

## 2019-05-14 RX ADMIN — PROPOFOL 160 MG: 10 INJECTION, EMULSION INTRAVENOUS at 08:51

## 2019-05-14 RX ADMIN — DEXAMETHASONE SODIUM PHOSPHATE 10 MG: 4 INJECTION, SOLUTION INTRAMUSCULAR; INTRAVENOUS at 09:05

## 2019-05-14 RX ADMIN — ROCURONIUM BROMIDE 50 MG: 10 INJECTION INTRAVENOUS at 08:51

## 2019-05-14 RX ADMIN — MIDAZOLAM HYDROCHLORIDE 2 MG: 1 INJECTION, SOLUTION INTRAMUSCULAR; INTRAVENOUS at 08:51

## 2019-05-14 RX ADMIN — LIDOCAINE HYDROCHLORIDE 80 MG: 20 INJECTION, SOLUTION EPIDURAL; INFILTRATION; INTRACAUDAL; PERINEURAL at 08:51

## 2019-05-14 RX ADMIN — FENTANYL CITRATE 50 MCG: 50 INJECTION INTRAMUSCULAR; INTRAVENOUS at 09:33

## 2019-05-14 RX ADMIN — OXYCODONE AND ACETAMINOPHEN 1 TABLET: 5; 325 TABLET ORAL at 12:18

## 2019-05-14 RX ADMIN — CEFAZOLIN 2 MG: 1 INJECTION, POWDER, FOR SOLUTION INTRAMUSCULAR; INTRAVENOUS; PARENTERAL at 08:59

## 2019-05-14 RX ADMIN — IBUPROFEN 800 MG: 800 TABLET, FILM COATED ORAL at 12:59

## 2019-05-14 RX ADMIN — Medication 0.6 MG: at 10:09

## 2019-05-14 RX ADMIN — OXYCODONE AND ACETAMINOPHEN 1 TABLET: 5; 325 TABLET ORAL at 12:59

## 2019-05-14 RX ADMIN — Medication 3 MG: at 10:09

## 2019-05-14 RX ADMIN — FENTANYL CITRATE 100 MCG: 50 INJECTION INTRAMUSCULAR; INTRAVENOUS at 08:51

## 2019-05-14 RX ADMIN — KETOROLAC TROMETHAMINE 30 MG: 30 INJECTION, SOLUTION INTRAMUSCULAR; INTRAVENOUS at 10:31

## 2019-05-14 RX ADMIN — FENTANYL CITRATE 50 MCG: 50 INJECTION INTRAMUSCULAR; INTRAVENOUS at 09:27

## 2019-05-14 ASSESSMENT — PULMONARY FUNCTION TESTS
PIF_VALUE: 29
PIF_VALUE: 26
PIF_VALUE: 30
PIF_VALUE: 25
PIF_VALUE: 28
PIF_VALUE: 13
PIF_VALUE: 18
PIF_VALUE: 23
PIF_VALUE: 27
PIF_VALUE: 8
PIF_VALUE: 23
PIF_VALUE: 28
PIF_VALUE: 13
PIF_VALUE: 18
PIF_VALUE: 9
PIF_VALUE: 23
PIF_VALUE: 28
PIF_VALUE: 24
PIF_VALUE: 17
PIF_VALUE: 2
PIF_VALUE: 28
PIF_VALUE: 27
PIF_VALUE: 25
PIF_VALUE: 19
PIF_VALUE: 21
PIF_VALUE: 2
PIF_VALUE: 29
PIF_VALUE: 1
PIF_VALUE: 25
PIF_VALUE: 28
PIF_VALUE: 18
PIF_VALUE: 28
PIF_VALUE: 28
PIF_VALUE: 7
PIF_VALUE: 13
PIF_VALUE: 18
PIF_VALUE: 29
PIF_VALUE: 28
PIF_VALUE: 19
PIF_VALUE: 19
PIF_VALUE: 18
PIF_VALUE: 18
PIF_VALUE: 15
PIF_VALUE: 28
PIF_VALUE: 24
PIF_VALUE: 19
PIF_VALUE: 28
PIF_VALUE: 28
PIF_VALUE: 18
PIF_VALUE: 19
PIF_VALUE: 18
PIF_VALUE: 28
PIF_VALUE: 27
PIF_VALUE: 27
PIF_VALUE: 15
PIF_VALUE: 6
PIF_VALUE: 23
PIF_VALUE: 28
PIF_VALUE: 21
PIF_VALUE: 11
PIF_VALUE: 27
PIF_VALUE: 18
PIF_VALUE: 18
PIF_VALUE: 20
PIF_VALUE: 19
PIF_VALUE: 19
PIF_VALUE: 0
PIF_VALUE: 2
PIF_VALUE: 28
PIF_VALUE: 4
PIF_VALUE: 19
PIF_VALUE: 2
PIF_VALUE: 0
PIF_VALUE: 19
PIF_VALUE: 15
PIF_VALUE: 13
PIF_VALUE: 27
PIF_VALUE: 0
PIF_VALUE: 26
PIF_VALUE: 19
PIF_VALUE: 27
PIF_VALUE: 0
PIF_VALUE: 21
PIF_VALUE: 29
PIF_VALUE: 27
PIF_VALUE: 29
PIF_VALUE: 26
PIF_VALUE: 29
PIF_VALUE: 18
PIF_VALUE: 29
PIF_VALUE: 27
PIF_VALUE: 20
PIF_VALUE: 5
PIF_VALUE: 28
PIF_VALUE: 20
PIF_VALUE: 29
PIF_VALUE: 19
PIF_VALUE: 28
PIF_VALUE: 2
PIF_VALUE: 19
PIF_VALUE: 1

## 2019-05-14 ASSESSMENT — PAIN DESCRIPTION - PAIN TYPE
TYPE: SURGICAL PAIN
TYPE: ACUTE PAIN
TYPE: SURGICAL PAIN

## 2019-05-14 ASSESSMENT — PAIN DESCRIPTION - LOCATION
LOCATION: ABDOMEN

## 2019-05-14 ASSESSMENT — PAIN SCALES - GENERAL
PAINLEVEL_OUTOF10: 3
PAINLEVEL_OUTOF10: 4
PAINLEVEL_OUTOF10: 5

## 2019-05-14 ASSESSMENT — PAIN DESCRIPTION - ORIENTATION: ORIENTATION: LOWER

## 2019-05-14 ASSESSMENT — PAIN DESCRIPTION - DESCRIPTORS: DESCRIPTORS: CRAMPING

## 2019-05-14 ASSESSMENT — PAIN - FUNCTIONAL ASSESSMENT: PAIN_FUNCTIONAL_ASSESSMENT: ACTIVITIES ARE NOT PREVENTED

## 2019-05-14 NOTE — BRIEF OP NOTE
Brief Operative Report      Pre-operative Diagnosis:  PMB, recurrent    Post-operative Diagnosis:  Same    Procedure:  GAIL Dorman    Surgeon: JORDAN Kent MD     Anesthesia:  General endotrachial anesthesia    Estimated blood loss:  Less than 100 ml     Findings: See Operative Dictation, Multifibroid uterus, normal left ovary, absent right ovary    Complications:  none      See dictated operative report for full details.       [address] [place] [address]

## 2019-05-14 NOTE — ANESTHESIA POSTPROCEDURE EVALUATION
Department of Anesthesiology  Postprocedure Note    Patient: Drew Scanlon  MRN: [id number]  YOB: 1970  Date of evaluation: 5/14/2019  Time:  11:45 AM     Procedure Summary     Date:  05/14/19 Room / Location:  [facility] OR 05 / [address] [place]    Anesthesia Start:  9752 Anesthesia Stop:  1033    Procedure:  ROBOTIC HYSTERECTOMY WITH LEFT SALPINGO-OOPHORECTOMY (N/A Uterus) Diagnosis:  (POSTMENOPAUSALBLEEDING, UTERINE FIBROID)    Surgeon:  Antoinette Gunter MD Responsible Provider:  Anh Betancourt MD    Anesthesia Type:  general ASA Status:  3          Anesthesia Type: No value filed. Michelle Phase I: Michelle Score: 9    Michelle Phase II: Michelle Score: 9    Last vitals: Reviewed and per EMR flowsheets. Anesthesia Post Evaluation   [address]  POST-ANESTHESIA NOTE       Name:  Drew Scanlon                                         Age:  44 y.o.   MRN:  [id number]      Last Vitals:  BP (!) 100/52   Pulse 53   Temp 97.4 °F (36.3 °C) (Temporal)   Resp 16   Ht 5' 3.5\" (1.613 m)   Wt 203 lb 3.2 oz (92.2 kg)   SpO2 94%   BMI 35.43 kg/m²   Patient Vitals for the past 4 hrs:   BP Temp Temp src Pulse Resp SpO2 Height Weight   05/14/19 1125 -- -- -- -- -- 94 % -- --   05/14/19 1123 (!) 100/52 -- -- 53 16 91 % -- --   05/14/19 1105 108/64 -- -- 50 14 94 % -- --   05/14/19 1102 -- 97.4 °F (36.3 °C) Temporal -- -- -- -- --   05/14/19 1100 109/65 -- -- (!) 49 15 96 % -- --   05/14/19 1055 107/62 -- -- (!) 48 16 96 % -- --   05/14/19 1050 111/62 -- -- (!) 47 15 97 % -- --   05/14/19 1045 113/62 -- -- (!) 49 17 96 % -- --   05/14/19 1040 (!) 116/57 -- -- 50 18 -- -- --   05/14/19 1035 (!) 107/57 -- -- 51 17 95 % -- --   05/14/19 1030 (!) 107/58 -- -- 58 19 92 % -- --   05/14/19 1029 -- 97.1 °F (36.2 °C) Temporal -- -- -- -- --   05/14/19 0748 135/73 98.5 °F (36.9 °C) Temporal 59 16 98 % 5' 3.5\" (1.613 m) 203 lb 3.2 oz (92.2 kg)       Level of Consciousness:  Awake    Respiratory:

## 2019-05-14 NOTE — H&P
Department of  Obstetrics and Gynecology  History and Physical  Date of Admission:  2019    CHIEF COMPLAINT:   PMB    History obtained from patient    HISTORY OF PRESENT ILLNESS:     The patient is a 44 y.o. female with significant past medical history of recurrent PMB who presents for definitive tx with hysterectomy. She has had D&C and EMB to rule out pathology which have been reassuring, but she continues to have these episodes of PMG. Options discussed, R/B reviewed Plan for [address]    Past Medical History:        Diagnosis Date    Depression     [facility] mellitus ([facility] [address]     Hypertension     Hypothyroidism     Multiple sclerosis ([facility] [address]      Past Surgical History:        Procedure Laterality Date    BREAST SURGERY  2012    biopsy     SECTION      COLONOSCOPY      last one     DILATION AND CURETTAGE OF UTERUS  7/15/2014    ENDOMETRIAL ABLATION  7/15/2014    EYE SURGERY  ? ? lasik, bilateral    FOOT SURGERY Right 2013    DEBRIDEMENT RT. ACHILLES TENDON WITH TOPAZ RT. ANKLE    FL HYSTEROSCOPY,W/ENDO BX N/A 2018    DILATATION AND CURETTAGE HYSTEROSCOPY performed by Nanette Good MD at [address] [place] [address] SALPINGO-OOPHORECTOMY Right 4/28/15    TONSILLECTOMY      at age 10 years    TUBAL LIGATION      UPPER GASTROINTESTINAL ENDOSCOPY      WISDOM TOOTH EXTRACTION             meds:  Current Facility-Administered Medications:     lactated ringers infusion, , Intravenous, Continuous, Nanette Good MD    ceFAZolin (ANCEF) 2 g in dextrose 5 % 50 mL IVPB, 2 g, Intravenous, On Call to OR, Nanette Good MD       Allergies:  Patient has no known allergies. Social History:  TOBACCO:   reports that she has never smoked. She has never used smokeless tobacco.  ETOH:   reports that she drinks about 1.2 oz of alcohol per week. DRUGS:   reports that she does not use drugs.     Family History:       Problem Relation Age of Onset    Heart Disease Mother     High Blood Pressure Mother     High Cholesterol Mother    Teagan Barges / Stillbirths Mother     Stroke Mother     Vision Loss Mother     Cancer Mother         skin    Arthritis Mother     Depression Father     Diabetes Father     Hearing Loss Father     Heart Disease Father     High Blood Pressure Father     Stroke Father     Substance Abuse Father         alcohol    Cancer Father         skin    Heart Disease Maternal Grandmother         PHYSICAL EXAM:    Vitals:  /73   Pulse 59   Temp 98.5 °F (36.9 °C) (Temporal)   Resp 16   Ht 5' 3.5\" (1.613 m)   Wt 203 lb 3.2 oz (92.2 kg)   SpO2 98%   BMI 35.43 kg/m²     CONSTITUTIONAL:  awake, alert, cooperative, no apparent distress, and appears stated age  ABDOMEN:  Soft, NT, ND  {GYN PELVIC EXAM: see office exam    DATA:  Labs:    CBC:   Lab Results   Component Value Date    WBC 5.4 05/10/2019    RBC 4.81 05/10/2019    RBC 4.55 2012    HGB 13.7 05/10/2019    HCT 43.0 05/10/2019    MCV 89.4 05/10/2019    RDW 14.1 2018     05/10/2019     BMP:    Lab Results   Component Value Date     2018    K 4.0 05/10/2019     2018    CO2 23 2018    BUN 9 2018       IMPRESSION/RECOMMENDATIONS:    50yo  with recurrent PMB  - Plan for [address]

## 2019-05-14 NOTE — OP NOTE
[address] [place] [address]                                OPERATIVE REPORT    PATIENT NAME: Dejon Mendoza                  :        1970  MED REC NO:   [id number]                           ROOM:  ACCOUNT NO:   [de-identified]                           ADMIT DATE: 2019  PROVIDER:     HOOD [place]:  2019    PREOPERATIVE DIAGNOSIS:  Postmenopausal bleeding. POSTOPERATIVE DIAGNOSIS:  Postmenopausal bleeding. OPERATION PERFORMED:  Robotic-assisted total laparoscopic hysterectomy  with left salpingo-oophorectomy. ANESTHESIA:  General.    COMPLICATIONS:  None. ESTIMATED BLOOD LOSS:  Less than 100. FINDINGS:  Multi-fibroid uterus, normal left ovary, absent right adnexa. OPERATIVE PROCEDURE:  The patient was taken to the operating room where  general anesthesia was found to be adequate. She was prepped and draped  in the dorsal lithotomy position with the Jesus stirrups and secured  to the operating room table with a beanbag device. A weighted speculum  was placed in the vagina and cervix was grasped with tenaculum. Suture  was placed at 12 and 6 o' clock, #0 Vicryl to secure the V-care device. A medium VCare cup was then placed within the uterine cavity and Yun  catheter was placed within the urinary bladder. Attention was then turned to the abdomen. A supraumbilical skin was  injected with Marcaine and incised with the scalpel. An 8 mm trocar was  then placed directly into the abdomen and it was insufflated with CO2  gas. The robotic trocars were placed in the right and left upper  quadrants and assistant port in the left lower quadrant. The [employer]  Si robot was then side-docked on the patient's right and fenestrated  bipolar and monopolar scissors were placed within the robotic ports.    Surgeon then went to the console for the remainder of the procedure. The left adnexa was visualized, ureter was identified, and the  infundibulopelvic ligament ligated, transected, and dissected up to the  level of the round ligament, which was ligated and transected. The  broad ligament was then opened up and dissected down to the level of the  uterine vessels and the bladder flap was begun. Attention was then  turned to the right round ligament, which was ligated and transected and  the broad ligament dissected down to the level of the uterine vessels  and the bladder flap was completed. The bladder was dissected off of  the anterior cervix and reflected below the VCare cup. Once this was  adequately completed, the uterine vessels were skeletonized bilaterally,  ligated and transected and dissected laterally off the VCare cup. The  posterior colpotomy was then created and carried around laterally until  the uterus was amputated at the cervix and removed from the abdomen  vaginally. The vaginal cuff was then reapproximated with #0 Vicryl  figure-of-eight sutures until hemostasis was achieved. There was some  mild oozing from the left apex, which was ligated with bipolar cautery,  and upon completion, all areas were hemostatic. Both ureters were visualized upon completion of procedure and were  peristalsing. The abdomen was then irrigated with warm normal saline  and all instruments were removed from the abdomen. The robot was  undocked. The port sites were removed and skin was closed with 4-0  Vicryl subcuticular stitch. All instruments were removed from the  vagina and the Yun catheter was removed upon completion. The patient  was then taken to the recovery room in a stable condition. Sponge, lap,  and needle counts were correct x2. She received Ancef prior to  procedure.         Cristo Freitas M.D.    D: 05/14/2019 10:10:35       T: 05/14/2019 12:19:50     [email or web address]  Job#: [id number]     Doc#: [id number]    CC:

## 2019-05-14 NOTE — ANESTHESIA PRE PROCEDURE
Department of Anesthesiology  Preprocedure Note       Name:  Elsa Fournier   Age:  44 y.o.  :  1970                                          MRN:  [id number]         Date:  2019      Surgeon: Felecia Llanes):  Doc Mckeon MD    Procedure: ROBOTIC HYSTERECTOMY WITH LEFT SALPINGO-OOPHORECTOMY (N/A Uterus)    Medications prior to admission:   Prior to Admission medications    Medication Sig Start Date End Date Taking? Authorizing Provider   metFORMIN (GLUCOPHAGE) 500 MG tablet Take 2,000 mg by mouth every evening    Yes Historical Provider, MD   Glatiramer Acetate (COPAXONE) 40 MG/ML SOSY INJECT 40 MG UNDER THE SKIN THREE TIMES A WEEK 18  Yes Margaret Herrera MD   lisinopril (PRINIVIL;ZESTRIL) 20 MG tablet Take 20 mg by mouth daily   Yes Historical Provider, MD   amLODIPine (NORVASC) 5 MG tablet Take 5 mg by mouth nightly 1/2 tab am 1/2 tab pm 5/15/16  Yes Historical Provider, MD   EVENING PRIMROSE OIL PO Take  by mouth daily. Yes Historical Provider, MD   Calcium Carbonate-Vitamin D (CALCIUM + D) 600-200 MG-UNIT TABS Take 3 tablets by mouth daily. Yes Historical Provider, MD   citalopram (CELEXA) 20 MG tablet Take 20 mg by mouth daily. 12  Yes Historical Provider, MD   levothyroxine (SYNTHROID) 75 MCG tablet Take 75 mcg by mouth daily. 12  Yes Historical Provider, MD   Azelaic Acid (FINACEA) 15 % GEL Apply  topically See Admin Instructions.     Historical Provider, MD       Current medications:    Current Facility-Administered Medications   Medication Dose Route Frequency Provider Last Rate Last Dose    lactated ringers infusion   Intravenous Continuous Doc Mckeon MD        ceFAZolin (ANCEF) 2 g in dextrose 5 % 50 mL IVPB  2 g Intravenous On Call to [address] Porter Sheikh MD           Allergies:  No Known Allergies    Problem List:    Patient Active Problem List   Diagnosis Code    Multiple sclerosis, relapsing-remitting ([facility] G35    Postmenopausal bleeding N95.0       Past Medical History:        Diagnosis Date    Depression     Diabetes mellitus ([facility]     Hypertension     Hypothyroidism     Multiple sclerosis ([facility]        Past Surgical History:        Procedure Laterality Date    BREAST SURGERY  2012    biopsy     SECTION      COLONOSCOPY      last one     DILATION AND CURETTAGE OF UTERUS  7/15/2014    ENDOMETRIAL ABLATION  7/15/2014    EYE SURGERY  2003? ? lasik, bilateral    FOOT SURGERY Right 2013    DEBRIDEMENT RT. ACHILLES TENDON WITH TOPAZ RT. ANKLE    UT HYSTEROSCOPY,W/ENDO BX N/A 2018    DILATATION AND CURETTAGE HYSTEROSCOPY performed by Jessica Cali MD at [facility] SALPINGO-OOPHORECTOMY Right 4/28/15    TONSILLECTOMY      at age 10 years   [facility] TUBAL LIGATION      UPPER GASTROINTESTINAL ENDOSCOPY      WISDOM TOOTH EXTRACTION         Social History:    Social History     Tobacco Use    Smoking status: Never Smoker    Smokeless tobacco: Never Used   Substance Use Topics    Alcohol use:  Yes     Alcohol/week: 1.2 oz     Types: 2 Glasses of wine per week     Comment: weekly                                Counseling given: Not Answered      Vital Signs (Current):   Vitals:    19 0723 19 0748   BP:  135/73   Pulse:  59   Resp:  16   Temp:  98.5 °F (36.9 °C)   TempSrc:  Temporal   SpO2:  98%   Weight: 200 lb (90.7 kg) 203 lb 3.2 oz (92.2 kg)   Height: 5' 4\" (1.626 m) 5' 3.5\" (1.613 m)                                              BP Readings from Last 3 Encounters:   19 135/73   19 126/82   18 130/74       NPO Status: Time of last liquid consumption: 0700(sip of water with medication)                        Time of last solid consumption:                         Date of last liquid consumption: 19                        Date of last solid food consumption: 19    BMI:   Wt Readings from Last 3 Encounters:   19 203 lb 3.2 oz (92.2 kg)   19 224 lb (101.6 kg)   18 224 lb (101.6 kg)     Body mass index is 35.43 kg/m². CBC:   Lab Results   Component Value Date    WBC 5.4 05/10/2019    RBC 4.81 05/10/2019    RBC 4.55 03/13/2012    HGB 13.7 05/10/2019    HCT 43.0 05/10/2019    MCV 89.4 05/10/2019    RDW 14.1 06/05/2018     05/10/2019       CMP:   Lab Results   Component Value Date     06/05/2018    K 4.0 05/10/2019     06/05/2018    CO2 23 06/05/2018    BUN 9 06/05/2018    CREATININE 0.5 06/05/2018    LABGLOM >90 06/05/2018    GLUCOSE 165 06/05/2018    GLUCOSE 114 03/13/2012    PROT 7.5 02/02/2016    CALCIUM 8.5 06/05/2018    BILITOT 0.6 02/02/2016    ALKPHOS 76 02/02/2016    AST 29 02/02/2016    ALT 38 02/02/2016       POC Tests:   Recent Labs     05/14/19  0801   POCGLU 99       Coags:   Lab Results   Component Value Date    INR 1.03 06/05/2018    APTT 26.9 06/16/2014       HCG (If Applicable):   Lab Results   Component Value Date    PREGTESTUR NEGATIVE 04/18/2015    PREGSERUM NEGATIVE 09/25/2015        ABGs: No results found for: PHART, PO2ART, VHD3UUO, CKN0DIT, BEART, V6ZEWUWC     Type & Screen (If Applicable):  Lab Results   Component Value Date    LABRH NEG 05/10/2019       Anesthesia Evaluation    Airway: Mallampati: II       Mouth opening: > = 3 FB Dental:          Pulmonary:       (-) COPD                           Cardiovascular:    (+) hypertension:,     (-) CAD      Rhythm: regular                      Neuro/Psych:               GI/Hepatic/Renal:             Endo/Other:    (+) Diabetes, hypothyroidism::., .                 Abdominal:   (+) obese,         Vascular:                                        Anesthesia Plan      general     ASA 3       Induction: intravenous. Anesthetic plan and risks discussed with patient. Plan discussed with CRNA.                 Anyi Douglas MD   5/14/2019

## 2019-06-04 ENCOUNTER — HOSPITAL ENCOUNTER (OUTPATIENT)
Age: 49
Discharge: HOME | End: 2019-06-04
Payer: COMMERCIAL

## 2019-06-04 LAB
ALBUMIN SERPL-MCNC: 4.3 G/DL (ref 3.5–5.1)
ALP BLD-CCNC: 63 U/L (ref 38–126)
ALT SERPL-CCNC: 13 U/L (ref 11–66)
ANION GAP SERPL CALCULATED.3IONS-SCNC: 9 MEQ/L (ref 8–16)
AST SERPL-CCNC: 14 U/L (ref 5–40)
BASOPHILS # BLD: 0.7 %
BASOPHILS ABSOLUTE: 0 THOU/MM3 (ref 0–0.1)
BILIRUB SERPL-MCNC: 0.6 MG/DL (ref 0.3–1.2)
BUN BLDV-MCNC: 15 MG/DL (ref 7–22)
CALCIUM SERPL-MCNC: 9.4 MG/DL (ref 8.5–10.5)
CHLORIDE BLD-SCNC: 106 MEQ/L (ref 98–111)
CHOLESTEROL, FASTING: 194 MG/DL (ref 100–199)
CO2: 27 MEQ/L (ref 23–33)
CREAT SERPL-MCNC: 0.7 MG/DL (ref 0.4–1.2)
CREATININE, URINE: 144.1 MG/DL
EOSINOPHIL # BLD: 5.8 %
EOSINOPHILS ABSOLUTE: 0.4 THOU/MM3 (ref 0–0.4)
ERYTHROCYTE [DISTWIDTH] IN BLOOD BY AUTOMATED COUNT: 13.2 % (ref 11.5–14.5)
ERYTHROCYTE [DISTWIDTH] IN BLOOD BY AUTOMATED COUNT: 42.2 FL (ref 35–45)
GFR SERPL CREATININE-BSD FRML MDRD: 88 ML/MIN/1.73M2
GLUCOSE FASTING: 102 MG/DL (ref 70–108)
HCT VFR BLD CALC: 40.8 % (ref 37–47)
HDLC SERPL-MCNC: 82 MG/DL
HEMOGLOBIN: 13.2 GM/DL (ref 12–16)
IMMATURE GRANS (ABS): 0.02 THOU/MM3 (ref 0–0.07)
IMMATURE GRANULOCYTES: 0.3 %
LDL CHOLESTEROL CALCULATED: 93 MG/DL
LYMPHOCYTES # BLD: 36.4 %
LYMPHOCYTES ABSOLUTE: 2.5 THOU/MM3 (ref 1–4.8)
MCH RBC QN AUTO: 28.8 PG (ref 26–33)
MCHC RBC AUTO-ENTMCNC: 32.4 GM/DL (ref 32.2–35.5)
MCV RBC AUTO: 88.9 FL (ref 81–99)
MICROALBUMIN UR-MCNC: < 1.2 MG/DL
MICROALBUMIN/CREAT UR-RTO: 8 MG/G (ref 0–30)
MONOCYTES # BLD: 7.3 %
MONOCYTES ABSOLUTE: 0.5 THOU/MM3 (ref 0.4–1.3)
NUCLEATED RED BLOOD CELLS: 0 /100 WBC
PLATELET # BLD: 314 THOU/MM3 (ref 130–400)
PMV BLD AUTO: 9.6 FL (ref 9.4–12.4)
POTASSIUM SERPL-SCNC: 4.5 MEQ/L (ref 3.5–5.2)
RBC # BLD: 4.59 MILL/MM3 (ref 4.2–5.4)
SEG NEUTROPHILS: 49.5 %
SEGMENTED NEUTROPHILS ABSOLUTE COUNT: 3.4 THOU/MM3 (ref 1.8–7.7)
SODIUM BLD-SCNC: 142 MEQ/L (ref 135–145)
T4 FREE: 1.25 NG/DL (ref 0.93–1.76)
TOTAL PROTEIN: 7.2 G/DL (ref 6.1–8)
TRIGLYCERIDE, FASTING: 94 MG/DL (ref 0–199)
TSH SERPL DL<=0.05 MIU/L-ACNC: 0.93 UIU/ML (ref 0.4–4.2)
WBC # BLD: 6.9 THOU/MM3 (ref 4.8–10.8)

## 2019-06-04 PROCEDURE — 84439 ASSAY OF FREE THYROXINE: CPT

## 2019-06-04 PROCEDURE — 85025 COMPLETE CBC W/AUTO DIFF WBC: CPT

## 2019-06-04 PROCEDURE — 82043 UR ALBUMIN QUANTITATIVE: CPT

## 2019-06-04 PROCEDURE — 80061 LIPID PANEL: CPT

## 2019-06-04 PROCEDURE — 80053 COMPREHEN METABOLIC PANEL: CPT

## 2019-06-04 PROCEDURE — 84443 ASSAY THYROID STIM HORMONE: CPT

## 2019-06-04 PROCEDURE — 36415 COLL VENOUS BLD VENIPUNCTURE: CPT

## 2019-07-05 DIAGNOSIS — G35 MULTIPLE SCLEROSIS (HCC): Primary | ICD-10-CM

## 2019-07-05 RX ORDER — GLATIRAMER 40 MG/ML
INJECTION, SOLUTION SUBCUTANEOUS
Qty: 36 SYRINGE | Refills: 3 | Status: SHIPPED | OUTPATIENT
Start: 2019-07-05 | End: 2020-06-04 | Stop reason: SDUPTHER

## 2019-09-28 ENCOUNTER — HOSPITAL ENCOUNTER (EMERGENCY)
Age: 49
Discharge: HOME | End: 2019-09-28
Attending: EMERGENCY MEDICINE
Payer: COMMERCIAL

## 2019-09-28 VITALS
RESPIRATION RATE: 20 BRPM | TEMPERATURE: 97.3 F | WEIGHT: 200 LBS | DIASTOLIC BLOOD PRESSURE: 72 MMHG | OXYGEN SATURATION: 97 % | HEIGHT: 64 IN | HEART RATE: 62 BPM | SYSTOLIC BLOOD PRESSURE: 137 MMHG | BODY MASS INDEX: 34.15 KG/M2

## 2019-09-28 DIAGNOSIS — N30.91 HEMORRHAGIC CYSTITIS: Primary | ICD-10-CM

## 2019-09-28 LAB
AMORPHOUS: ABNORMAL
BACTERIA: ABNORMAL
BILIRUBIN URINE: ABNORMAL
BLOOD, URINE: ABNORMAL
CASTS UA: ABNORMAL /LPF
CHARACTER, URINE: ABNORMAL
COLOR: ABNORMAL
CRYSTALS, UA: ABNORMAL
EPITHELIAL CELLS, UA: ABNORMAL /HPF
GLUCOSE, URINE: NEGATIVE MG/DL
ICTOTEST: NEGATIVE
KETONES, URINE: 15
LEUKOCYTE ESTERASE, URINE: NEGATIVE
MUCUS: ABNORMAL
NITRITE, URINE: POSITIVE
PH UA: 6 (ref 5–9)
PROTEIN UA: >= 300 MG/DL
RBC UA: > 200 /HPF
REFLEX TO URINE C & S: ABNORMAL
SPECIFIC GRAVITY UA: >= 1.03 (ref 1–1.03)
UROBILINOGEN, URINE: 1 EU/DL (ref 0–1)
WBC UA: ABNORMAL /HPF

## 2019-09-28 PROCEDURE — 87186 SC STD MICRODIL/AGAR DIL: CPT

## 2019-09-28 PROCEDURE — 6370000000 HC RX 637 (ALT 250 FOR IP): Performed by: EMERGENCY MEDICINE

## 2019-09-28 PROCEDURE — 99283 EMERGENCY DEPT VISIT LOW MDM: CPT

## 2019-09-28 PROCEDURE — 87086 URINE CULTURE/COLONY COUNT: CPT

## 2019-09-28 PROCEDURE — 87077 CULTURE AEROBIC IDENTIFY: CPT

## 2019-09-28 PROCEDURE — 81001 URINALYSIS AUTO W/SCOPE: CPT

## 2019-09-28 RX ORDER — PHENAZOPYRIDINE HYDROCHLORIDE 100 MG/1
100 TABLET, FILM COATED ORAL ONCE
Status: COMPLETED | OUTPATIENT
Start: 2019-09-28 | End: 2019-09-28

## 2019-09-28 RX ORDER — PHENAZOPYRIDINE HYDROCHLORIDE 100 MG/1
100 TABLET, FILM COATED ORAL 3 TIMES DAILY PRN
Qty: 6 TABLET | Refills: 0 | Status: SHIPPED | OUTPATIENT
Start: 2019-09-28 | End: 2019-09-30

## 2019-09-28 RX ORDER — CIPROFLOXACIN 500 MG/1
500 TABLET, FILM COATED ORAL ONCE
Status: COMPLETED | OUTPATIENT
Start: 2019-09-28 | End: 2019-09-28

## 2019-09-28 RX ORDER — CIPROFLOXACIN 500 MG/1
500 TABLET, FILM COATED ORAL 2 TIMES DAILY
Qty: 14 TABLET | Refills: 0 | Status: SHIPPED | OUTPATIENT
Start: 2019-09-28 | End: 2019-10-05

## 2019-09-28 RX ADMIN — CIPROFLOXACIN 500 MG: 500 TABLET, FILM COATED ORAL at 22:33

## 2019-09-28 RX ADMIN — PHENAZOPYRIDINE 100 MG: 100 TABLET ORAL at 22:33

## 2019-10-01 LAB
ORGANISM: ABNORMAL
URINE CULTURE REFLEX: ABNORMAL

## 2019-10-21 ENCOUNTER — OFFICE VISIT (OUTPATIENT)
Dept: NEUROLOGY | Age: 49
End: 2019-10-21
Payer: COMMERCIAL

## 2019-10-21 ENCOUNTER — HOSPITAL ENCOUNTER (OUTPATIENT)
Age: 49
Discharge: HOME | End: 2019-10-21
Payer: COMMERCIAL

## 2019-10-21 VITALS
HEART RATE: 72 BPM | DIASTOLIC BLOOD PRESSURE: 82 MMHG | HEIGHT: 63 IN | SYSTOLIC BLOOD PRESSURE: 124 MMHG | WEIGHT: 207.4 LBS | BODY MASS INDEX: 36.75 KG/M2

## 2019-10-21 DIAGNOSIS — G35 MULTIPLE SCLEROSIS (HCC): ICD-10-CM

## 2019-10-21 DIAGNOSIS — G35 MULTIPLE SCLEROSIS (HCC): Primary | ICD-10-CM

## 2019-10-21 LAB — VITAMIN D 25-HYDROXY: 59 NG/ML (ref 30–100)

## 2019-10-21 PROCEDURE — 99213 OFFICE O/P EST LOW 20 MIN: CPT | Performed by: NURSE PRACTITIONER

## 2019-10-21 PROCEDURE — 82306 VITAMIN D 25 HYDROXY: CPT

## 2019-10-21 PROCEDURE — 36415 COLL VENOUS BLD VENIPUNCTURE: CPT

## 2019-10-21 RX ORDER — TEMAZEPAM 15 MG/1
15 CAPSULE ORAL NIGHTLY PRN
COMMUNITY

## 2019-10-21 RX ORDER — NYSTATIN 100000 [USP'U]/G
POWDER TOPICAL PRN
COMMUNITY
End: 2021-03-14

## 2019-10-21 RX ORDER — CLOBETASOL PROPIONATE 0.5 MG/G
CREAM TOPICAL PRN
COMMUNITY
End: 2021-03-14

## 2020-06-04 RX ORDER — GLATIRAMER 40 MG/ML
INJECTION, SOLUTION SUBCUTANEOUS
Qty: 36 SYRINGE | Refills: 3 | Status: SHIPPED | OUTPATIENT
Start: 2020-06-04 | End: 2021-01-15 | Stop reason: SDUPTHER

## 2020-06-10 ENCOUNTER — VIRTUAL VISIT (OUTPATIENT)
Dept: NEUROLOGY | Age: 50
End: 2020-06-10
Payer: COMMERCIAL

## 2020-06-10 PROCEDURE — 99213 OFFICE O/P EST LOW 20 MIN: CPT | Performed by: PSYCHIATRY & NEUROLOGY

## 2020-06-22 NOTE — PROGRESS NOTES
Take 5 mg by mouth nightly 1/2 tab am  Historical Provider, MD   Azelaic Acid (FINACEA) 15 % GEL Apply topically See Admin Instructions Indications: prn   Historical Provider, MD   EVENING PRIMROSE OIL PO Take  by mouth daily. Historical Provider, MD   Calcium Carbonate-Vitamin D (CALCIUM + D) 600-200 MG-UNIT TABS Take 3 tablets by mouth daily. Historical Provider, MD   citalopram (CELEXA) 20 MG tablet Take 20 mg by mouth daily. Historical Provider, MD   levothyroxine (SYNTHROID) 75 MCG tablet Take 75 mcg by mouth daily. Historical Provider, MD       Social History     Tobacco Use    Smoking status: Never Smoker    Smokeless tobacco: Never Used   Substance Use Topics    Alcohol use: Yes     Alcohol/week: 2.0 standard drinks     Types: 2 Glasses of wine per week     Comment: weekly    Drug use: No        Past Medical History:   Diagnosis Date    Depression     [facility] mellitus [facility]     Hypertension     Hypothyroidism     Multiple sclerosis ([facility]    ,   Past Surgical History:   Procedure Laterality Date    BREAST SURGERY  2012    biopsy     SECTION      COLONOSCOPY      last one     DILATION AND CURETTAGE OF UTERUS  7/15/2014    ENDOMETRIAL ABLATION  7/15/2014    EYE SURGERY  2003? ?     lasik, bilateral    FOOT SURGERY Right 2013    DEBRIDEMENT RT. ACHILLES TENDON WITH TOPAZ RT. ANKLE    HYSTERECTOMY ABDOMINAL N/A 2019    ROBOTIC HYSTERECTOMY WITH LEFT SALPINGO-OOPHORECTOMY performed by Ye Lawton MD at [place] [address] N/A 2018    DILATATION AND CURETTAGE HYSTEROSCOPY performed by Ye Lawton MD at [address] [place] [address] SALPINGO-OOPHORECTOMY Right 4/28/15    TONSILLECTOMY      at age 10 years   [facility] TUBAL LIGATION      UPPER GASTROINTESTINAL ENDOSCOPY      WISDOM TOOTH EXTRACTION     ,   Social History     Tobacco Use    Smoking status: Never Smoker    Smokeless tobacco: Never Used   Substance Use Topics    Alcohol use: Yes     Alcohol/week: 2.0 standard drinks     Types: 2 Glasses of wine per week     Comment: weekly    Drug use: No   ,   Family History   Problem Relation Age of Onset    Heart Disease Mother     High Blood Pressure Mother     High Cholesterol Mother     Miscarriages / Stillbirths Mother     Stroke Mother     Vision Loss Mother     Cancer Mother         skin    Arthritis Mother     Depression Father     Diabetes Father     Hearing Loss Father     Heart Disease Father     High Blood Pressure Father     Stroke Father     Substance Abuse Father         alcohol    Cancer Father         skin    Heart Disease Maternal Grandmother        PHYSICAL EXAMINATION:  [ INSTRUCTIONS:  \"[x]\" Indicates a positive item  \"[]\" Indicates a negative item  -- DELETE ALL ITEMS NOT EXAMINED]  Vital Signs: (As obtained by patient/caregiver or practitioner observation)    Blood pressure-  Heart rate-    Respiratory rate-    Temperature-  Pulse oximetry-     Constitutional: [x] Appears well-developed and well-nourished [x] No apparent distress      [] Abnormal-   Mental status  [x] Alert and awake  [x] Oriented to person/place/time [x]Able to follow commands      Eyes:  EOM    [x]  Normal  [] Abnormal-  Sclera  [x]  Normal  [] Abnormal -         Discharge [x]  None visible  [] Abnormal -    HENT:   [x] Normocephalic, atraumatic.   [] Abnormal   [x] Mouth/Throat: Mucous membranes are moist.     External Ears [x] Normal  [] Abnormal-     Neck: [x] No visualized mass     Pulmonary/Chest: [x] Respiratory effort normal.  [x] No visualized signs of difficulty breathing or respiratory distress        [] Abnormal-      Musculoskeletal:   [] Normal gait with no signs of ataxia         [x] Normal range of motion of neck        [] Abnormal-       Neurological:        [x] No Facial Asymmetry (Cranial nerve 7 motor function) (limited exam to video visit)          [x] No gaze palsy        [] Abnormal-         Skin:        [x] No significant exanthematous lesions or discoloration noted on facial skin         [] Abnormal-            Psychiatric:       [x] Normal Affect [x] No Hallucinations        [] Abnormal-     Other pertinent observable physical exam findings-     ASSESSMENT/PLAN:  1. Multiple sclerosis ([address]    She is doing the same. No numbness or weakness. She feels she can have some word finding difficulty and memory trouble for the past 1 month. She is on Copaxone and is tolerating the medication well. We will arrange for her to undergo an MRI of the brain with and without contrast to evaluate for demyelination. We will also obtain a vitamin D level. After detailed discussion with patient we agreed on the following plan. Plan:  1. MRI brain W/WO contrast   2. Vitamin D level  3. Continue Copaxone 40 mg SC three times a week. Refills given. 4. Continue with primrose oil, vitamin D and calcium. 5. Report any new symptoms or concerns. 6. Follow up in 4 weeks or sooner if needed. 7. Call if any questions or concerns or new symptoms. Return in about 4 weeks (around 7/8/2020). Romulo Perez is a 44 y.o. female being evaluated by a Virtual Visit (video visit) encounter to address concerns as mentioned above. A caregiver was present when appropriate. Due to this being a TeleHealth encounter (During [facility] public health emergency), evaluation of the following organ systems was limited: Vitals/Constitutional/EENT/Resp/CV/GI//MS/Neuro/Skin/Heme-Lymph-Imm. Pursuant to the emergency declaration under the [facility], [address] [place] [address] authority and the [employer] and [employer] Act, this Virtual Visit was conducted with patient's (and/or legal guardian's) consent, to reduce the patient's risk of exposure to COVID-19 and provide necessary medical care.   The patient (and/or legal guardian) has also been advised to contact this office for

## 2020-07-07 ENCOUNTER — HOSPITAL ENCOUNTER (OUTPATIENT)
Dept: MRI IMAGING | Age: 50
Discharge: HOME | End: 2020-07-07
Payer: COMMERCIAL

## 2020-07-07 ENCOUNTER — HOSPITAL ENCOUNTER (OUTPATIENT)
Age: 50
Discharge: HOME | End: 2020-07-07
Payer: COMMERCIAL

## 2020-07-07 DIAGNOSIS — G35 MULTIPLE SCLEROSIS (HCC): ICD-10-CM

## 2020-07-07 LAB
CREAT SERPL-MCNC: 0.9 MG/DL (ref 0.6–1.3)
GFR, ESTIMATED: 70 ML/MIN/1.73M2

## 2020-07-07 PROCEDURE — 82565 ASSAY OF CREATININE: CPT

## 2020-07-07 PROCEDURE — 36415 COLL VENOUS BLD VENIPUNCTURE: CPT

## 2020-07-07 PROCEDURE — 6360000004 HC RX CONTRAST MEDICATION: Performed by: NURSE PRACTITIONER

## 2020-07-07 PROCEDURE — A9579 GAD-BASE MR CONTRAST NOS,1ML: HCPCS | Performed by: NURSE PRACTITIONER

## 2020-07-07 PROCEDURE — 70553 MRI BRAIN STEM W/O & W/DYE: CPT

## 2020-07-07 RX ADMIN — GADOTERIDOL 20 ML: 279.3 INJECTION, SOLUTION INTRAVENOUS at 16:46

## 2020-07-08 ENCOUNTER — TELEPHONE (OUTPATIENT)
Dept: NEUROLOGY | Age: 50
End: 2020-07-08

## 2020-07-08 NOTE — TELEPHONE ENCOUNTER
----- Message from MATT Wooten - CNP sent at 7/8/2020  8:38 AM EDT -----  Please let patient know her MRI brain was stable compared to prior study.   Yanira Cancino CNP

## 2020-07-13 ENCOUNTER — VIRTUAL VISIT (OUTPATIENT)
Dept: NEUROLOGY | Age: 50
End: 2020-07-13
Payer: COMMERCIAL

## 2020-07-13 PROCEDURE — 99213 OFFICE O/P EST LOW 20 MIN: CPT | Performed by: NURSE PRACTITIONER

## 2020-07-13 PROCEDURE — 3017F COLORECTAL CA SCREEN DOC REV: CPT | Performed by: NURSE PRACTITIONER

## 2020-07-13 PROCEDURE — G8427 DOCREV CUR MEDS BY ELIG CLIN: HCPCS | Performed by: NURSE PRACTITIONER

## 2020-07-13 ASSESSMENT — ENCOUNTER SYMPTOMS
EYES NEGATIVE: 1
RESPIRATORY NEGATIVE: 1
GASTROINTESTINAL NEGATIVE: 1

## 2020-07-13 NOTE — PROGRESS NOTES
2020    TELEHEALTH EVALUATION -- Audio/Visual (During [facility]-64 public health emergency)    HPI:    Misael Pratt (:  1970) has requested an audio/video evaluation for the following concern(s): multiple sclerosis. She is doing better, she feels her word finding difficulty has improved. She denies any new symptoms. No new numbness or weakness. She is on Copaxone and is tolerating the medication well. She is being evaluated virtually to discuss results of testing performed and the plan of care going forward. Review of Systems   Eyes: Negative. Respiratory: Negative. Cardiovascular: Negative. Gastrointestinal: Negative. Genitourinary: Negative. Neurological: Negative. Prior to Visit Medications    Medication Sig Taking? Authorizing Provider   Glatiramer Acetate (COPAXONE) 40 MG/ML SOSY INJECT 40 MG UNDER THE SKIN THREE TIMES A WEEK Yes Hetal Hernández, APRN - CNP   Cholecalciferol (VITAMIN D3) 5000 units TABS Take by mouth nightly Yes Historical Provider, MD   Omega-3 Fatty Acids (OMEGA-3 CF PO) Take 4 tablets by mouth nightly Yes Historical Provider, MD   NYSTATIN-TRIAMCINOLONE EX Apply topically as needed Yes Historical Provider, MD   nystatin (MYCOSTATIN) 521869 UNIT/GM powder Apply topically as needed Apply topically 4 times daily. Yes Historical Provider, MD   NONFORMULARY as needed Indications: C-Testosterone 2%>Petrolatum Yes Historical Provider, MD   Lidocaine 2 % GEL Apply topically as needed Yes Historical Provider, MD   clobetasol (TEMOVATE) 0.05 % cream Apply topically as needed Apply topically 2 times daily.  Yes Historical Provider, MD   metFORMIN (GLUCOPHAGE) 500 MG tablet Take 2,000 mg by mouth daily (with breakfast)  Yes Historical Provider, MD   lisinopril (PRINIVIL;ZESTRIL) 20 MG tablet Take 10 mg by mouth daily  Yes Historical Provider, MD   amLODIPine (NORVASC) 5 MG tablet Take 5 mg by mouth nightly 1/2 tab am Yes Historical Provider, MD   Azelaic Acid (FINACEA) 15 % GEL Apply topically See Admin Instructions Indications: prn  Yes Historical Provider, MD   EVENING PRIMROSE OIL PO Take  by mouth daily. Yes Historical Provider, MD   Calcium Carbonate-Vitamin D (CALCIUM + D) 600-200 MG-UNIT TABS Take 3 tablets by mouth daily. Yes Historical Provider, MD   citalopram (CELEXA) 20 MG tablet Take 20 mg by mouth daily. Yes Historical Provider, MD   levothyroxine (SYNTHROID) 75 MCG tablet Take 75 mcg by mouth daily. Yes Historical Provider, MD   temazepam (RESTORIL) 15 MG capsule Take 15 mg by mouth nightly as needed for Sleep. Historical Provider, MD       Social History     Tobacco Use    Smoking status: Never Smoker    Smokeless tobacco: Never Used   Substance Use Topics    Alcohol use: Yes     Alcohol/week: 2.0 standard drinks     Types: 2 Glasses of wine per week     Comment: weekly    Drug use: No        Past Medical History:   Diagnosis Date    Depression     Diabetes mellitus [facility]     Hypertension     Hypothyroidism     Multiple sclerosis ([facility]    ,   Past Surgical History:   Procedure Laterality Date    BREAST SURGERY  2012    biopsy     SECTION      COLONOSCOPY      last one     DILATION AND CURETTAGE OF UTERUS  7/15/2014    ENDOMETRIAL ABLATION  7/15/2014    EYE SURGERY  2003? ?     lasik, bilateral    FOOT SURGERY Right 2013    DEBRIDEMENT RT. ACHILLES TENDON WITH TOPAZ RT. ANKLE    HYSTERECTOMY ABDOMINAL N/A 2019    ROBOTIC HYSTERECTOMY WITH LEFT SALPINGO-OOPHORECTOMY performed by Apryl Gee MD at [place] [address] N/A 2018    DILATATION AND CURETTAGE HYSTEROSCOPY performed by Apryl Gee MD at [address] [place] [address] SALPINGO-OOPHORECTOMY Right 4/28/15    TONSILLECTOMY      at age 10 years   [facility] TUBAL LIGATION      UPPER GASTROINTESTINAL ENDOSCOPY      WISDOM TOOTH EXTRACTION     ,   Social History     Tobacco Use    Smoking status: Never Smoker    Smokeless tobacco: Never Used   Substance Use Topics    Alcohol use: Yes     Alcohol/week: 2.0 standard drinks     Types: 2 Glasses of wine per week     Comment: weekly    Drug use: No   ,   Family History   Problem Relation Age of Onset    Heart Disease Mother     High Blood Pressure Mother     High Cholesterol Mother    [de-identified] / Stillbirths Mother     Stroke Mother     Vision Loss Mother     Cancer Mother         skin    Arthritis Mother     Depression Father     Diabetes Father     Hearing Loss Father     Heart Disease Father     High Blood Pressure Father     Stroke Father     Substance Abuse Father         alcohol    Cancer Father         skin    Heart Disease Maternal Grandmother      MRI brain done 7/7/2020:  Narrative    PROCEDURE: MRI BRAIN W WO CONTRAST         CLINICAL INFORMATION: Multiple sclerosis ([address] difficulty speaking. Memory issues. .         COMPARISON: 8/6/2011.         TECHNIQUE: Sagittal FSPGR bravo pre and postcontrast with axial and coronal reconstructions, sagittal and axial FLAIR, axial T2, GRE and DWI images of the brain with post processed ADC map. 20 mL ProHance was injected in the right AC.         FINDINGS: The ventricles, cisterns and sulci are symmetric and normal in size and configuration. There are mild scattered focal areas of T2/FLAIR prolongation in the periventricular, subcortical deep white matter with callosal septal involvement in    keeping with history of multiple sclerosis. No new/interval lesion is identified. No intra or extra-axial mass is identified.         Following contrast administration, no focal areas of abnormal parenchymal or meningeal enhancement are identified.         The major vascular flow voids appear patent. Orbits are unremarkable.  Paranasal sinuses and mastoid air cells are clear.              Impression    Stable plaque burden without new/interval lesion or enhancing lesion to suggest active demyelination.                   **This report has been created using voice recognition software.  It may contain minor errors which are inherent in voice recognition technology. **         Final report electronically signed by Dr. Jazmyne Avina MD on 7/7/2020 5:08 PM               PHYSICAL EXAMINATION:  [ INSTRUCTIONS:  \"[x]\" Indicates a positive item  \"[]\" Indicates a negative item  -- DELETE ALL ITEMS NOT EXAMINED]  Vital Signs: (As obtained by patient/caregiver or practitioner observation)    Blood pressure-  Heart rate-    Respiratory rate-    Temperature-  Pulse oximetry-     Constitutional: [x] Appears well-developed and well-nourished [x] No apparent distress      [] Abnormal-   Mental status  [x] Alert and awake  [x] Oriented to person/place/time [x]Able to follow commands      Eyes:  EOM    [x]  Normal  [] Abnormal-  Sclera  [x]  Normal  [] Abnormal -         Discharge [x]  None visible  [] Abnormal -    HENT:   [x] Normocephalic, atraumatic. [] Abnormal   [x] Mouth/Throat: Mucous membranes are moist.     External Ears [x] Normal  [] Abnormal-     Neck: [x] No visualized mass     Pulmonary/Chest: [x] Respiratory effort normal.  [x] No visualized signs of difficulty breathing or respiratory distress        [] Abnormal-      Musculoskeletal:   [x] Normal gait with no signs of ataxia         [x] Normal range of motion of neck        [] Abnormal-       Neurological:        [x] No Facial Asymmetry (Cranial nerve 7 motor function) (limited exam to video visit)          [x] No gaze palsy        [] Abnormal-         Skin:        [x] No significant exanthematous lesions or discoloration noted on facial skin         [] Abnormal-            Psychiatric:       [x] Normal Affect [x] No Hallucinations        [] Abnormal-     Other pertinent observable physical exam findings-     ASSESSMENT/PLAN:  1. Multiple sclerosis ([address]    She is doing better. She feels her word finding difficulty and memory trouble has improved. She denies any new symptoms.  No new numbness or weakness. She is on Copaxone and is tolerating the medication well. She had an MRI brain done 7/7/2020 that was stable. She has not yet got her vitamin D level drawn. After detailed discussion with patient we agreed on the following plan. Plan:  1. Proceed with Vitamin D level  2. Continue Copaxone 40 mg SC three times a week. Refills given.   3. Continue with primrose oil, vitamin D and calcium. 4. Report any new symptoms or concerns.   5. Follow up in 6 months or sooner if needed  6. Call if any questions or concerns or new symptoms.            Return in about 6 months (around 1/13/2021). Santa Fong is a 44 y.o. female being evaluated by a Virtual Visit (video visit) encounter to address concerns as mentioned above. A caregiver was present when appropriate. Due to this being a TeleHealth encounter ([facility]), evaluation of the following organ systems was limited: Vitals/Constitutional/EENT/Resp/CV/GI//MS/Neuro/Skin/Heme-Lymph-Imm. Pursuant to the emergency declaration under the [address] [place] [address], [address] [place] [address] authority and the [employer] and [employer] Act, this Virtual Visit was conducted with patient's (and/or legal guardian's) consent, to reduce the patient's risk of exposure to COVID-19 and provide necessary medical care. The patient (and/or legal guardian) has also been advised to contact this office for worsening conditions or problems, and seek emergency medical treatment and/or call 911 if deemed necessary. Patient identification was verified at the start of the visit: Yes    Total time spent on this encounter: 15 minutes    Services were provided through a video synchronous discussion virtually to substitute for in-person clinic visit. Patient and provider were located at their individual homes.     --MATT Cazares CNP on 7/13/2020 at 12:11 PM    An electronic signature was used to authenticate this note.

## 2020-07-13 NOTE — PATIENT INSTRUCTIONS
1. Proceed with Vitamin D level  2. Continue Copaxone 40 mg SC three times a week. Refills given.   3. Continue with primrose oil, vitamin D and calcium. 4. Report any new symptoms or concerns.   5. Follow up in 6 months or sooner if needed  6.  Call if any questions or concerns or new symptoms.

## 2020-09-30 ENCOUNTER — HOSPITAL ENCOUNTER (OUTPATIENT)
Age: 50
Discharge: HOME | End: 2020-09-30
Payer: COMMERCIAL

## 2020-09-30 LAB
ALBUMIN SERPL-MCNC: 4 G/DL (ref 3.5–5.1)
ALP BLD-CCNC: 72 U/L (ref 38–126)
ALT SERPL-CCNC: 22 U/L (ref 11–66)
ANION GAP SERPL CALCULATED.3IONS-SCNC: 12 MEQ/L (ref 8–16)
AST SERPL-CCNC: 17 U/L (ref 5–40)
AVERAGE GLUCOSE: 120 MG/DL (ref 70–126)
BASOPHILS # BLD: 1.1 %
BASOPHILS ABSOLUTE: 0.1 THOU/MM3 (ref 0–0.1)
BILIRUB SERPL-MCNC: 0.5 MG/DL (ref 0.3–1.2)
BUN BLDV-MCNC: 13 MG/DL (ref 7–22)
CALCIUM SERPL-MCNC: 9.1 MG/DL (ref 8.5–10.5)
CHLORIDE BLD-SCNC: 104 MEQ/L (ref 98–111)
CHOLESTEROL, TOTAL: 207 MG/DL (ref 100–199)
CO2: 24 MEQ/L (ref 23–33)
CREAT SERPL-MCNC: 0.7 MG/DL (ref 0.4–1.2)
CREATININE, URINE: 164.1 MG/DL
EOSINOPHIL # BLD: 5.1 %
EOSINOPHILS ABSOLUTE: 0.3 THOU/MM3 (ref 0–0.4)
ERYTHROCYTE [DISTWIDTH] IN BLOOD BY AUTOMATED COUNT: 12.8 % (ref 11.5–14.5)
ERYTHROCYTE [DISTWIDTH] IN BLOOD BY AUTOMATED COUNT: 42.2 FL (ref 35–45)
GFR SERPL CREATININE-BSD FRML MDRD: 88 ML/MIN/1.73M2
GLUCOSE BLD-MCNC: 142 MG/DL (ref 70–108)
HBA1C MFR BLD: 6 % (ref 4.4–6.4)
HCT VFR BLD CALC: 42.6 % (ref 37–47)
HDLC SERPL-MCNC: 78 MG/DL
HEMOGLOBIN: 13.6 GM/DL (ref 12–16)
IMMATURE GRANS (ABS): 0.01 THOU/MM3 (ref 0–0.07)
IMMATURE GRANULOCYTES: 0.2 %
LDL CHOLESTEROL CALCULATED: 113 MG/DL
LYMPHOCYTES # BLD: 38.1 %
LYMPHOCYTES ABSOLUTE: 2.3 THOU/MM3 (ref 1–4.8)
MCH RBC QN AUTO: 28.9 PG (ref 26–33)
MCHC RBC AUTO-ENTMCNC: 31.9 GM/DL (ref 32.2–35.5)
MCV RBC AUTO: 90.4 FL (ref 81–99)
MICROALBUMIN UR-MCNC: < 1.2 MG/DL
MICROALBUMIN/CREAT UR-RTO: 7 MG/G (ref 0–30)
MONOCYTES # BLD: 8.8 %
MONOCYTES ABSOLUTE: 0.5 THOU/MM3 (ref 0.4–1.3)
NUCLEATED RED BLOOD CELLS: 0 /100 WBC
PLATELET # BLD: 281 THOU/MM3 (ref 130–400)
PMV BLD AUTO: 9.9 FL (ref 9.4–12.4)
POTASSIUM SERPL-SCNC: 4.2 MEQ/L (ref 3.5–5.2)
RBC # BLD: 4.71 MILL/MM3 (ref 4.2–5.4)
SEG NEUTROPHILS: 46.7 %
SEGMENTED NEUTROPHILS ABSOLUTE COUNT: 2.8 THOU/MM3 (ref 1.8–7.7)
SODIUM BLD-SCNC: 140 MEQ/L (ref 135–145)
T4 FREE: 1.26 NG/DL (ref 0.93–1.76)
TOTAL PROTEIN: 7.2 G/DL (ref 6.1–8)
TRIGL SERPL-MCNC: 81 MG/DL (ref 0–199)
TSH SERPL DL<=0.05 MIU/L-ACNC: 3.1 UIU/ML (ref 0.4–4.2)
VITAMIN D 25-HYDROXY: 50 NG/ML (ref 30–100)
WBC # BLD: 6.1 THOU/MM3 (ref 4.8–10.8)

## 2020-09-30 PROCEDURE — 83036 HEMOGLOBIN GLYCOSYLATED A1C: CPT

## 2020-09-30 PROCEDURE — 36415 COLL VENOUS BLD VENIPUNCTURE: CPT

## 2020-09-30 PROCEDURE — 85025 COMPLETE CBC W/AUTO DIFF WBC: CPT

## 2020-09-30 PROCEDURE — 80053 COMPREHEN METABOLIC PANEL: CPT

## 2020-09-30 PROCEDURE — 82043 UR ALBUMIN QUANTITATIVE: CPT

## 2020-09-30 PROCEDURE — 80061 LIPID PANEL: CPT

## 2020-09-30 PROCEDURE — 84443 ASSAY THYROID STIM HORMONE: CPT

## 2020-09-30 PROCEDURE — 82306 VITAMIN D 25 HYDROXY: CPT

## 2020-09-30 PROCEDURE — 84439 ASSAY OF FREE THYROXINE: CPT

## 2020-11-11 NOTE — PROGRESS NOTES
Patient contacted regarding COVID-19 screen. Following questions asked: In the last month, have you been in contact with someone who was confirmed or suspected to have Coronavirus/COVID-19:  Patient stated NO    Do you or family members have any of the following symptoms:  Cough-no   Muscle pain-no   Shortness of breath-no   Fever-no   Weakness-no  Severe headache-no   Sore throat-no   Respiratory symptoms-no  Loss of taste and smell-no    Have you traveled in the last month? No  NPO after midnight  [employer] and drivers license  Wear comfortable clean clothing  Do not bring jewelry  Shower night before and morning of surgery with a liquid antibacterial soap  Bring list of medications with dosage and how often taken  Follow all instructions given by your physician   needed at discharge  No visitors allowed in with patient at this time  Please bring and wear mask  Call [phone] for any questions Instructed to call surgery center at [phone] upon arrival to speak with  before entering building.   Covid screen due

## 2020-11-12 ENCOUNTER — HOSPITAL ENCOUNTER (OUTPATIENT)
Age: 50
Setting detail: SPECIMEN
Discharge: HOME | End: 2020-11-12
Payer: COMMERCIAL

## 2020-11-12 PROCEDURE — U0003 INFECTIOUS AGENT DETECTION BY NUCLEIC ACID (DNA OR RNA); SEVERE ACUTE RESPIRATORY SYNDROME CORONAVIRUS 2 (SARS-COV-2) (CORONAVIRUS DISEASE [COVID-19]), AMPLIFIED PROBE TECHNIQUE, MAKING USE OF HIGH THROUGHPUT TECHNOLOGIES AS DESCRIBED BY CMS-2020-01-R: HCPCS

## 2020-11-13 ENCOUNTER — HOSPITAL ENCOUNTER (OUTPATIENT)
Age: 50
Discharge: HOME | End: 2020-11-13
Payer: COMMERCIAL

## 2020-11-13 LAB
EKG ATRIAL RATE: 55 BPM
EKG P AXIS: 54 DEGREES
EKG P-R INTERVAL: 152 MS
EKG Q-T INTERVAL: 444 MS
EKG QRS DURATION: 112 MS
EKG QTC CALCULATION (BAZETT): 424 MS
EKG R AXIS: 26 DEGREES
EKG T AXIS: 20 DEGREES
EKG VENTRICULAR RATE: 55 BPM

## 2020-11-13 PROCEDURE — 93005 ELECTROCARDIOGRAM TRACING: CPT | Performed by: SPECIALIST

## 2020-11-14 PROCEDURE — 93010 ELECTROCARDIOGRAM REPORT: CPT | Performed by: NUCLEAR MEDICINE

## 2020-11-15 LAB — SARS-COV-2: NOT DETECTED

## 2020-11-18 ENCOUNTER — HOSPITAL ENCOUNTER (OUTPATIENT)
Age: 50
Setting detail: OUTPATIENT SURGERY
Discharge: HOME | End: 2020-11-18
Attending: SPECIALIST | Admitting: SPECIALIST
Payer: COMMERCIAL

## 2020-11-18 ENCOUNTER — ANESTHESIA EVENT (OUTPATIENT)
Dept: OPERATING ROOM | Age: 50
End: 2020-11-18
Payer: COMMERCIAL

## 2020-11-18 ENCOUNTER — ANESTHESIA (OUTPATIENT)
Dept: OPERATING ROOM | Age: 50
End: 2020-11-18
Payer: COMMERCIAL

## 2020-11-18 VITALS
OXYGEN SATURATION: 93 % | DIASTOLIC BLOOD PRESSURE: 74 MMHG | HEIGHT: 63 IN | SYSTOLIC BLOOD PRESSURE: 160 MMHG | TEMPERATURE: 96.9 F | BODY MASS INDEX: 41.99 KG/M2 | WEIGHT: 237 LBS | RESPIRATION RATE: 14 BRPM | HEART RATE: 57 BPM

## 2020-11-18 VITALS
DIASTOLIC BLOOD PRESSURE: 70 MMHG | OXYGEN SATURATION: 96 % | SYSTOLIC BLOOD PRESSURE: 146 MMHG | RESPIRATION RATE: 11 BRPM

## 2020-11-18 LAB — GLUCOSE BLD-MCNC: 117 MG/DL (ref 70–108)

## 2020-11-18 PROCEDURE — 3700000000 HC ANESTHESIA ATTENDED CARE: Performed by: SPECIALIST

## 2020-11-18 PROCEDURE — 2500000003 HC RX 250 WO HCPCS: Performed by: SPECIALIST

## 2020-11-18 PROCEDURE — 3600000002 HC SURGERY LEVEL 2 BASE: Performed by: SPECIALIST

## 2020-11-18 PROCEDURE — 7100000010 HC PHASE II RECOVERY - FIRST 15 MIN: Performed by: SPECIALIST

## 2020-11-18 PROCEDURE — 6360000002 HC RX W HCPCS: Performed by: NURSE ANESTHETIST, CERTIFIED REGISTERED

## 2020-11-18 PROCEDURE — 6360000002 HC RX W HCPCS: Performed by: SPECIALIST

## 2020-11-18 PROCEDURE — 88307 TISSUE EXAM BY PATHOLOGIST: CPT

## 2020-11-18 PROCEDURE — 2580000003 HC RX 258: Performed by: SPECIALIST

## 2020-11-18 PROCEDURE — 82948 REAGENT STRIP/BLOOD GLUCOSE: CPT

## 2020-11-18 PROCEDURE — 3700000001 HC ADD 15 MINUTES (ANESTHESIA): Performed by: SPECIALIST

## 2020-11-18 PROCEDURE — 7100000011 HC PHASE II RECOVERY - ADDTL 15 MIN: Performed by: SPECIALIST

## 2020-11-18 PROCEDURE — 88305 TISSUE EXAM BY PATHOLOGIST: CPT

## 2020-11-18 PROCEDURE — 2709999900 HC NON-CHARGEABLE SUPPLY: Performed by: SPECIALIST

## 2020-11-18 PROCEDURE — 2500000003 HC RX 250 WO HCPCS: Performed by: NURSE ANESTHETIST, CERTIFIED REGISTERED

## 2020-11-18 PROCEDURE — 3600000012 HC SURGERY LEVEL 2 ADDTL 15MIN: Performed by: SPECIALIST

## 2020-11-18 RX ORDER — FENTANYL CITRATE 50 UG/ML
INJECTION, SOLUTION INTRAMUSCULAR; INTRAVENOUS PRN
Status: DISCONTINUED | OUTPATIENT
Start: 2020-11-18 | End: 2020-11-18 | Stop reason: SDUPTHER

## 2020-11-18 RX ORDER — SODIUM CHLORIDE 9 MG/ML
INJECTION, SOLUTION INTRAVENOUS CONTINUOUS
Status: DISCONTINUED | OUTPATIENT
Start: 2020-11-18 | End: 2020-11-18 | Stop reason: HOSPADM

## 2020-11-18 RX ORDER — MIDAZOLAM HYDROCHLORIDE 1 MG/ML
INJECTION INTRAMUSCULAR; INTRAVENOUS PRN
Status: DISCONTINUED | OUTPATIENT
Start: 2020-11-18 | End: 2020-11-18 | Stop reason: SDUPTHER

## 2020-11-18 RX ORDER — LIDOCAINE HYDROCHLORIDE 20 MG/ML
INJECTION, SOLUTION EPIDURAL; INFILTRATION; INTRACAUDAL; PERINEURAL PRN
Status: DISCONTINUED | OUTPATIENT
Start: 2020-11-18 | End: 2020-11-18 | Stop reason: SDUPTHER

## 2020-11-18 RX ORDER — LIDOCAINE HYDROCHLORIDE AND EPINEPHRINE 10; 10 MG/ML; UG/ML
INJECTION, SOLUTION INFILTRATION; PERINEURAL PRN
Status: DISCONTINUED | OUTPATIENT
Start: 2020-11-18 | End: 2020-11-18 | Stop reason: ALTCHOICE

## 2020-11-18 RX ORDER — PROPOFOL 10 MG/ML
INJECTION, EMULSION INTRAVENOUS PRN
Status: DISCONTINUED | OUTPATIENT
Start: 2020-11-18 | End: 2020-11-18 | Stop reason: SDUPTHER

## 2020-11-18 RX ADMIN — FENTANYL CITRATE 50 MCG: 50 INJECTION, SOLUTION INTRAMUSCULAR; INTRAVENOUS at 10:24

## 2020-11-18 RX ADMIN — LIDOCAINE HYDROCHLORIDE 80 MG: 20 INJECTION, SOLUTION EPIDURAL; INFILTRATION; INTRACAUDAL; PERINEURAL at 10:15

## 2020-11-18 RX ADMIN — CEFAZOLIN 2 G: 10 INJECTION, POWDER, FOR SOLUTION INTRAVENOUS at 10:20

## 2020-11-18 RX ADMIN — PROPOFOL 25 MG: 10 INJECTION, EMULSION INTRAVENOUS at 10:22

## 2020-11-18 RX ADMIN — PROPOFOL 50 MG: 10 INJECTION, EMULSION INTRAVENOUS at 10:16

## 2020-11-18 RX ADMIN — PROPOFOL 25 MG: 10 INJECTION, EMULSION INTRAVENOUS at 10:17

## 2020-11-18 RX ADMIN — SODIUM CHLORIDE: 9 INJECTION, SOLUTION INTRAVENOUS at 10:12

## 2020-11-18 RX ADMIN — FENTANYL CITRATE 50 MCG: 50 INJECTION, SOLUTION INTRAMUSCULAR; INTRAVENOUS at 10:14

## 2020-11-18 RX ADMIN — MIDAZOLAM HYDROCHLORIDE 2 MG: 1 INJECTION, SOLUTION INTRAMUSCULAR; INTRAVENOUS at 10:13

## 2020-11-18 ASSESSMENT — PULMONARY FUNCTION TESTS
PIF_VALUE: 0

## 2020-11-18 ASSESSMENT — PAIN - FUNCTIONAL ASSESSMENT: PAIN_FUNCTIONAL_ASSESSMENT: 0-10

## 2020-11-18 NOTE — PROGRESS NOTES
1036: Patient arrived to Phase II recovery via chair. Awake and alert.  present in room. 1038: VSS, patient denies pain. Site to lower lip remains clean dry and intact with moderate amount of swelling noted. Patient given cold compress to apply per Dr. Angelika Howard orders. Drink provided and patient educated to sip from cup. Verbalized understanding. Call light placed within reach. 1114: IV removed without complications. Bandaid applied. Patient sat up in chair without difficulty and denies dizziness or lightheadedness. Dressed independently in room with  present. 1121: Discharge instructions reviewed with patient and patient's . 1125: Patient discharged home in stable condition with . Yes

## 2020-11-18 NOTE — OP NOTE
SURGICAL PATHOLOGY Bethany Bloom MD 11/18/2020 9867        Implants:  * No implants in log *      Drains: * No LDAs found *    Findings: 1cm right lower lip subcutaneous cystic mass    Detailed Description of Procedure:   Excision of right lower lip subcutaneous cystic mass (CPT 14061)    Electronically signed by Bethany Bloom MD on 11/18/2020 at 12:07 PM

## 2020-11-18 NOTE — ANESTHESIA PRE PROCEDURE
Department of Anesthesiology  Preprocedure Note       Name:  Margi Villarreal   Age:  44 y.o.  :  1970                                          MRN:  [id number]         Date:  2020      Surgeon: Mundo Linton):  Jena Corona MD    Procedure: EXCISION CYST RIGHT LOWER LIP (Right )    Medications prior to admission:   Prior to Admission medications    Medication Sig Start Date End Date Taking? Authorizing Provider   Glatiramer Acetate (COPAXONE) 40 MG/ML SOSY INJECT 40 MG UNDER THE SKIN THREE TIMES A WEEK 20   Brendon Araujo APRN - CNP   Cholecalciferol (VITAMIN D3) 5000 units TABS Take by mouth nightly    Historical Provider, MD   Omega-3 Fatty Acids (OMEGA-3 CF PO) Take 4 tablets by mouth nightly    Historical Provider, MD   temazepam (RESTORIL) 15 MG capsule Take 15 mg by mouth nightly as needed for Sleep. Historical Provider, MD   NYSTATIN-TRIAMCINOLONE EX Apply topically as needed    Historical Provider, MD   nystatin (MYCOSTATIN) 790127 UNIT/GM powder Apply topically as needed Apply topically 4 times daily. Historical Provider, MD   NONFORMULARY as needed Indications: C-Testosterone 2%>Petrolatum    Historical Provider, MD   Lidocaine 2 % GEL Apply topically as needed    Historical Provider, MD   clobetasol (TEMOVATE) 0.05 % cream Apply topically as needed Apply topically 2 times daily. Historical Provider, MD   metFORMIN (GLUCOPHAGE) 500 MG tablet Take 2,000 mg by mouth daily (with breakfast)     Historical Provider, MD   lisinopril (PRINIVIL;ZESTRIL) 20 MG tablet Take 10 mg by mouth daily     Historical Provider, MD   amLODIPine (NORVASC) 5 MG tablet Take 5 mg by mouth nightly 1/2 tab am 5/15/16   Historical Provider, MD   Azelaic Acid (FINACEA) 15 % GEL Apply topically See Admin Instructions Indications: prn     Historical Provider, MD   EVENING PRIMROSE OIL PO Take  by mouth daily.       Historical Provider, MD   Calcium Carbonate-Vitamin D (CALCIUM + D) 600-200 MG-UNIT TABS Take 3 tablets by mouth daily. Historical Provider, MD   citalopram (CELEXA) 20 MG tablet Take 20 mg by mouth daily. 12   Historical Provider, MD   levothyroxine (SYNTHROID) 75 MCG tablet Take 75 mcg by mouth daily. 12   Historical Provider, MD       Current medications:    No current facility-administered medications for this visit. No current outpatient medications on file. Facility-Administered Medications Ordered in Other Visits   Medication Dose Route Frequency Provider Last Rate Last Dose    0.9 % sodium chloride infusion   Intravenous Continuous Anai Canela MD        ceFAZolin (ANCEF) 2 g in dextrose 5 % 50 mL IVPB  2 g Intravenous On Call to 56 Melina Herrera MD           Allergies:  No Known Allergies    Problem List:    Patient Active Problem List   Diagnosis Code    Multiple sclerosis, relapsing-remitting ([facility] G35    Postmenopausal bleeding N95.0       Past Medical History:        Diagnosis Date    Depression     Diabetes mellitus ([facility]     patient states prediabetes    Hypertension     Hypothyroidism     Multiple sclerosis ([facility] [address]        Past Surgical History:        Procedure Laterality Date    BREAST SURGERY  2012    biopsy     SECTION      COLONOSCOPY      last one    [address] [facility] OF UTERUS  7/15/2014    ENDOMETRIAL ABLATION  7/15/2014    EYE SURGERY  ? ?     lasik, bilateral    FOOT SURGERY Right 2013    DEBRIDEMENT RT. ACHILLES TENDON WITH TOPAZ RT. ANKLE    HYSTERECTOMY ABDOMINAL N/A 2019    ROBOTIC HYSTERECTOMY WITH LEFT SALPINGO-OOPHORECTOMY performed by Trudy Clay MD at [place] [address] N/A 2018    DILATATION AND CURETTAGE HYSTEROSCOPY performed by Trudy Clay MD at [address] [place] [address] SALPINGO-OOPHORECTOMY Right 4/28/15    TONSILLECTOMY      at age 10 years   [facility] TUBAL LIGATION      UPPER GASTROINTESTINAL ENDOSCOPY      WISDOM TOOTH EXTRACTION         Social History:    Social History     Tobacco Use    Smoking status: Never Smoker    Smokeless tobacco: Never Used   Substance Use Topics    Alcohol use: Yes     Alcohol/week: 2.0 standard drinks     Types: 2 Glasses of wine per week     Comment: weekly                                Counseling given: Not Answered      Vital Signs (Current): There were no vitals filed for this visit. BP Readings from Last 3 Encounters:   11/18/20 (!) 186/88   10/21/19 124/82   09/28/19 137/72       NPO Status:                                                                                 BMI:   Wt Readings from Last 3 Encounters:   11/18/20 237 lb (107.5 kg)   10/21/19 207 lb 6.4 oz (94.1 kg)   09/28/19 200 lb (90.7 kg)     There is no height or weight on file to calculate BMI.    CBC:   Lab Results   Component Value Date    WBC 6.1 09/30/2020    RBC 4.71 09/30/2020    RBC 4.55 03/13/2012    HGB 13.6 09/30/2020    HCT 42.6 09/30/2020    MCV 90.4 09/30/2020    RDW 14.1 06/05/2018     09/30/2020       CMP:   Lab Results   Component Value Date     09/30/2020    K 4.2 09/30/2020     09/30/2020    CO2 24 09/30/2020    BUN 13 09/30/2020    CREATININE 0.7 09/30/2020    LABGLOM 88 09/30/2020    GLUCOSE 142 09/30/2020    GLUCOSE 114 03/13/2012    PROT 7.2 09/30/2020    CALCIUM 9.1 09/30/2020    BILITOT 0.5 09/30/2020    ALKPHOS 72 09/30/2020    AST 17 09/30/2020    ALT 22 09/30/2020       POC Tests:   No results for input(s): POCGLU, POCNA, POCK, POCCL, POCBUN, POCHEMO, POCHCT in the last 72 hours.     Coags:   Lab Results   Component Value Date    INR 1.03 06/05/2018    APTT 26.9 06/16/2014       HCG (If Applicable):   Lab Results   Component Value Date    PREGTESTUR NEGATIVE 04/18/2015    PREGSERUM NEGATIVE 09/25/2015        ABGs: No results found for: PHART, PO2ART, XAD7CTB, YQD3ABY, BEART, [id number]     Type & Screen (If Applicable):  Lab Results   Component Value Date    [address] NEG 05/10/2019       Anesthesia Evaluation   no history of anesthetic complications:   Airway: Mallampati: II       Mouth opening: > = 3 FB Dental:          Pulmonary:       (-) COPD          Patient did not smoke on day of surgery. Cardiovascular:  Exercise tolerance: good (>4 METS),   (+) hypertension:,     (-) CAD      Rhythm: regular                      Neuro/Psych:   Negative Neuro/Psych ROS              GI/Hepatic/Renal:             Endo/Other:    (+) Diabetes, hypothyroidism::., .          Pt had no PAT visit       Abdominal:   (+) obese,         Vascular: negative vascular ROS. Anesthesia Plan      MAC     ASA 3       Induction: intravenous. Anesthetic plan and risks discussed with patient. Plan discussed with CRNA.                   Matilda Anderson MD   11/18/2020

## 2020-11-18 NOTE — ANESTHESIA POSTPROCEDURE EVALUATION
Department of Anesthesiology  Postprocedure Note    Patient: Alexis Weaver  MRN: [id number]  YOB: 1970  Date of evaluation: 11/18/2020  Time:  12:16 PM     Procedure Summary     Date:  11/18/20 Room / Location:  [address] / [facility]    Anesthesia Start:  1011 Anesthesia Stop:  0858    Procedure:  EXCISION CYST RIGHT LOWER LIP (Right ) Diagnosis:  (CYST RIGHT LOWER LIP)    Surgeon:  Mehul Lance MD Responsible Provider:  Angela Garrido MD    Anesthesia Type:  MAC ASA Status:  3          Anesthesia Type: MAC    Michelle Phase I:      Michelle Phase II: Michelle Score: 10    Last vitals: Reviewed and per EMR flowsheets.        Anesthesia Post Evaluation    Patient location during evaluation: PACU  Patient participation: complete - patient participated  Level of consciousness: awake and alert  Airway patency: patent  Nausea & Vomiting: no nausea  Complications: no  Cardiovascular status: blood pressure returned to baseline and hemodynamically stable  Respiratory status: acceptable and spontaneous ventilation  Hydration status: euvolemic

## 2020-11-18 NOTE — H&P
[address] [address]  History and Physical Update    Pt Name: Aaron Gonzalez  MRN: [id number]  YOB: 1970  Date of evaluation: 11/18/2020    I have examined the patient and reviewed the H&P/Consult and there are no changes to the patient or plans.       Valerie Andersen  Electronically signed 11/18/2020 at 6:50 AM

## 2021-01-15 ENCOUNTER — VIRTUAL VISIT (OUTPATIENT)
Dept: NEUROLOGY | Age: 51
End: 2021-01-15
Payer: COMMERCIAL

## 2021-01-15 DIAGNOSIS — G35 MULTIPLE SCLEROSIS (HCC): Primary | ICD-10-CM

## 2021-01-15 PROCEDURE — 99213 OFFICE O/P EST LOW 20 MIN: CPT | Performed by: PSYCHIATRY & NEUROLOGY

## 2021-01-15 RX ORDER — BACLOFEN 10 MG/1
10 TABLET ORAL 2 TIMES DAILY
Qty: 60 TABLET | Refills: 1 | Status: SHIPPED | OUTPATIENT
Start: 2021-01-15 | End: 2021-10-15 | Stop reason: ALTCHOICE

## 2021-01-15 RX ORDER — GLATIRAMER 40 MG/ML
INJECTION, SOLUTION SUBCUTANEOUS
Qty: 36 SYRINGE | Refills: 3 | Status: SHIPPED | OUTPATIENT
Start: 2021-01-15 | End: 2021-10-22 | Stop reason: SDUPTHER

## 2021-01-15 ASSESSMENT — ENCOUNTER SYMPTOMS
RESPIRATORY NEGATIVE: 1
EYES NEGATIVE: 1
GASTROINTESTINAL NEGATIVE: 1

## 2021-01-15 NOTE — PATIENT INSTRUCTIONS
1. Start Baclofen 10 mg twice a day. 2. Continue Copaxone 40 mg SC three times a week. Refills given.   3. Referral to sleep evaluation. 4. Continue with primrose oil, vitamin D and calcium. 5. Report any new symptoms or concerns.   6. Follow up in 6 months or sooner if needed  7. Call if any questions or concerns or new symptoms.

## 2021-01-15 NOTE — PROGRESS NOTES
1/15/2021    TELEHEALTH EVALUATION -- Audio/Visual (During WZK-01 public health emergency)    HPI:    Diann Meeks (:  1970) has requested an audio/video evaluation for the following concern(s): multiple sclerosis. She is doing better,She is doing better. She has been having legs spasm mostly when sitting that is noticeable. She is also complaining of fatigue, memory difficulty, she has poor interrupted sleep snores, takes daytime naps, wakes up tired from sleep. No new numbness or weakness. She is on Copaxone and is tolerating the medication well. She is being evaluated virtually to discuss results of testing performed and the plan of care going forward. Review of Systems   Eyes: Negative. Respiratory: Negative. Cardiovascular: Negative. Gastrointestinal: Negative. Genitourinary: Negative. Neurological: Negative. Prior to Visit Medications    Medication Sig Taking? Authorizing Provider   Glatiramer Acetate (COPAXONE) 40 MG/ML SOSY INJECT 40 MG UNDER THE SKIN THREE TIMES A WEEK  MATT Lopez - CNP   Cholecalciferol (VITAMIN D3) 5000 units TABS Take by mouth nightly  Historical Provider, MD   Omega-3 Fatty Acids (OMEGA-3 CF PO) Take 4 tablets by mouth nightly  Historical Provider, MD   temazepam (RESTORIL) 15 MG capsule Take 15 mg by mouth nightly as needed for Sleep. Historical Provider, MD   NYSTATIN-TRIAMCINOLONE EX Apply topically as needed  Historical Provider, MD   nystatin (MYCOSTATIN) 458438 UNIT/GM powder Apply topically as needed Apply topically 4 times daily. Historical Provider, MD   NONFORMULARY as needed Indications: C-Testosterone 2%>Petrolatum  Historical Provider, MD   Lidocaine 2 % GEL Apply topically as needed  Historical Provider, MD   clobetasol (TEMOVATE) 0.05 % cream Apply topically as needed Apply topically 2 times daily.   Historical Provider, MD Following contrast administration, no focal areas of abnormal parenchymal or meningeal enhancement are identified.         The major vascular flow voids appear patent. Orbits are unremarkable. Paranasal sinuses and mastoid air cells are clear.              Impression    Stable plaque burden without new/interval lesion or enhancing lesion to suggest active demyelination.                   **This report has been created using voice recognition software.  It may contain minor errors which are inherent in voice recognition technology. **         Final report electronically signed by Dr. Leopoldo Mins, MD on 7/7/2020 5:08 PM               PHYSICAL EXAMINATION:  [ INSTRUCTIONS:  \"[x]\" Indicates a positive item  \"[]\" Indicates a negative item  -- DELETE ALL ITEMS NOT EXAMINED]  Vital Signs: (As obtained by patient/caregiver or practitioner observation)    Blood pressure-  Heart rate-    Respiratory rate-    Temperature-  Pulse oximetry-     Constitutional: [x] Appears well-developed and well-nourished [x] No apparent distress      [] Abnormal-   Mental status  [x] Alert and awake  [x] Oriented to person/place/time [x]Able to follow commands      Eyes:  EOM    [x]  Normal  [] Abnormal-  Sclera  [x]  Normal  [] Abnormal -         Discharge [x]  None visible  [] Abnormal -    HENT:   [x] Normocephalic, atraumatic.   [] Abnormal   [x] Mouth/Throat: Mucous membranes are moist.     External Ears [x] Normal  [] Abnormal-     Neck: [x] No visualized mass     Pulmonary/Chest: [x] Respiratory effort normal.  [x] No visualized signs of difficulty breathing or respiratory distress        [] Abnormal-      Musculoskeletal:   [x] Normal gait with no signs of ataxia         [x] Normal range of motion of neck        [] Abnormal-       Neurological:        [x] No Facial Asymmetry (Cranial nerve 7 motor function) (limited exam to video visit)          [x] No gaze palsy        [] Abnormal- Skin:        [x] No significant exanthematous lesions or discoloration noted on facial skin         [] Abnormal-            Psychiatric:       [x] Normal Affect [x] No Hallucinations        [] Abnormal-     Other pertinent observable physical exam findings-     ASSESSMENT/PLAN:  1. Multiple sclerosis (HCC)    She is doing better. She has been having legs spasm mostly when sitting that is noticeable. She is also complaining of fatigue, memory difficulty, she has poor interrupted sleep snores, takes daytime naps, wakes up tired from sleep. She suspects she has sleep apnea. I advised the patient that she would need a sleep evaluation, she was agreeable. She otherwise denies new symptoms. No new numbness or weakness. She is on Copaxone and is tolerating the medication well. She had an MRI brain done 7/7/2020 that was stable. She has not yet got her vitamin D level drawn. After detailed discussion with patient we agreed on the following plan. Plan:  1. Start Baclofen 10 mg twice a day. 2. Continue Copaxone 40 mg SC three times a week. Refills given.   3. Referral to sleep evaluation. 4. Continue with primrose oil, vitamin D and calcium. 5. Report any new symptoms or concerns.   6. Follow up in 6 months or sooner if needed  7. Call if any questions or concerns or new symptoms.            Return in about 6 months (around 7/15/2021). Poli Sung is a 44 y.o. female being evaluated by a Virtual Visit (video visit) encounter to address concerns as mentioned above. A caregiver was present when appropriate. Due to this being a TeleHealth encounter (During VBRHX-50 public health emergency), evaluation of the following organ systems was limited: Vitals/Constitutional/EENT/Resp/CV/GI//MS/Neuro/Skin/Heme-Lymph-Imm. Pursuant to the emergency declaration under the [address] [place] [address] and the [employer] Act, this Virtual Visit was conducted with patient's (and/or legal guardian's) consent, to reduce the patient's risk of exposure to COVID-19 and provide necessary medical care. The patient (and/or legal guardian) has also been advised to contact this office for worsening conditions or problems, and seek emergency medical treatment and/or call 911 if deemed necessary. Patient identification was verified at the start of the visit: Yes  Total time spent on this encounter: 24min. Services were provided through a video synchronous discussion virtually to substitute for in-person clinic visit. Patient and provider were located at their individual homes. --Mark Iqbal MD on 1/15/2021 at 2:02 PM    An electronic signature was used to authenticate this note.

## 2021-03-13 ENCOUNTER — HOSPITAL ENCOUNTER (OUTPATIENT)
Age: 51
Discharge: HOME | End: 2021-03-13
Payer: COMMERCIAL

## 2021-03-13 LAB
ALT SERPL-CCNC: 17 U/L (ref 11–66)
AST SERPL-CCNC: 17 U/L (ref 5–40)
CHOLESTEROL, TOTAL: 148 MG/DL (ref 100–199)
HDLC SERPL-MCNC: 85 MG/DL
LDL CHOLESTEROL CALCULATED: 46 MG/DL
T4 FREE: 1.32 NG/DL (ref 0.93–1.76)
TRIGL SERPL-MCNC: 84 MG/DL (ref 0–199)
TSH SERPL DL<=0.05 MIU/L-ACNC: 3.51 UIU/ML (ref 0.4–4.2)

## 2021-03-13 PROCEDURE — 84450 TRANSFERASE (AST) (SGOT): CPT

## 2021-03-13 PROCEDURE — 84439 ASSAY OF FREE THYROXINE: CPT

## 2021-03-13 PROCEDURE — 36415 COLL VENOUS BLD VENIPUNCTURE: CPT

## 2021-03-13 PROCEDURE — 84443 ASSAY THYROID STIM HORMONE: CPT

## 2021-03-13 PROCEDURE — 80061 LIPID PANEL: CPT

## 2021-03-13 PROCEDURE — 84460 ALANINE AMINO (ALT) (SGPT): CPT

## 2021-03-14 ENCOUNTER — APPOINTMENT (OUTPATIENT)
Dept: GENERAL RADIOLOGY | Age: 51
End: 2021-03-14
Payer: COMMERCIAL

## 2021-03-14 ENCOUNTER — APPOINTMENT (OUTPATIENT)
Dept: CT IMAGING | Age: 51
End: 2021-03-14
Payer: COMMERCIAL

## 2021-03-14 ENCOUNTER — HOSPITAL ENCOUNTER (EMERGENCY)
Age: 51
Discharge: HOME | End: 2021-03-15
Attending: EMERGENCY MEDICINE
Payer: COMMERCIAL

## 2021-03-14 DIAGNOSIS — K80.50 BILIARY COLIC: ICD-10-CM

## 2021-03-14 DIAGNOSIS — K80.20 GALLSTONES: ICD-10-CM

## 2021-03-14 DIAGNOSIS — R10.13 EPIGASTRIC PAIN: Primary | ICD-10-CM

## 2021-03-14 LAB
BASOPHILS # BLD: 0.5 % (ref 0–3)
EKG ATRIAL RATE: 61 BPM
EKG P AXIS: 53 DEGREES
EKG P-R INTERVAL: 160 MS
EKG Q-T INTERVAL: 438 MS
EKG QRS DURATION: 116 MS
EKG QTC CALCULATION (BAZETT): 440 MS
EKG R AXIS: 14 DEGREES
EKG T AXIS: 7 DEGREES
EKG VENTRICULAR RATE: 61 BPM
EOSINOPHILS RELATIVE PERCENT: 3.3 % (ref 0–4)
HCT VFR BLD CALC: 40.9 % (ref 37–47)
HEMOGLOBIN: 13.5 GM/DL (ref 12–16)
LYMPHOCYTES # BLD: 32.3 % (ref 15–47)
MCH RBC QN AUTO: 28.7 PG (ref 27–31)
MCHC RBC AUTO-ENTMCNC: 33.1 GM/DL (ref 33–37)
MCV RBC AUTO: 86.6 FL (ref 81–99)
MONOCYTES: 8 % (ref 0–12)
PDW BLD-RTO: 13.2 % (ref 11.5–14.5)
PLATELET # BLD: 304 THOU/MM3 (ref 130–400)
PMV BLD AUTO: 6.8 FL (ref 7.4–10.4)
RBC # BLD: 4.72 MILL/MM3 (ref 4.2–5.4)
SEGS: 55.9 % (ref 43–75)
WBC # BLD: 8.7 THOU/MM3 (ref 4.8–10.8)

## 2021-03-14 PROCEDURE — 71046 X-RAY EXAM CHEST 2 VIEWS: CPT

## 2021-03-14 PROCEDURE — 83690 ASSAY OF LIPASE: CPT

## 2021-03-14 PROCEDURE — 93005 ELECTROCARDIOGRAM TRACING: CPT | Performed by: EMERGENCY MEDICINE

## 2021-03-14 PROCEDURE — 83880 ASSAY OF NATRIURETIC PEPTIDE: CPT

## 2021-03-14 PROCEDURE — 85025 COMPLETE CBC W/AUTO DIFF WBC: CPT

## 2021-03-14 PROCEDURE — 36415 COLL VENOUS BLD VENIPUNCTURE: CPT

## 2021-03-14 PROCEDURE — 84484 ASSAY OF TROPONIN QUANT: CPT

## 2021-03-14 PROCEDURE — 80053 COMPREHEN METABOLIC PANEL: CPT

## 2021-03-14 PROCEDURE — 6360000002 HC RX W HCPCS: Performed by: EMERGENCY MEDICINE

## 2021-03-14 PROCEDURE — 74177 CT ABD & PELVIS W/CONTRAST: CPT

## 2021-03-14 PROCEDURE — 2709999900 HC NON-CHARGEABLE SUPPLY

## 2021-03-14 PROCEDURE — 99285 EMERGENCY DEPT VISIT HI MDM: CPT | Performed by: EMERGENCY MEDICINE

## 2021-03-14 PROCEDURE — 96374 THER/PROPH/DIAG INJ IV PUSH: CPT | Performed by: EMERGENCY MEDICINE

## 2021-03-14 RX ORDER — ATORVASTATIN CALCIUM 10 MG/1
10 TABLET, FILM COATED ORAL DAILY
COMMUNITY

## 2021-03-14 RX ORDER — KETOROLAC TROMETHAMINE 30 MG/ML
15 INJECTION, SOLUTION INTRAMUSCULAR; INTRAVENOUS ONCE
Status: COMPLETED | OUTPATIENT
Start: 2021-03-15 | End: 2021-03-14

## 2021-03-14 RX ADMIN — KETOROLAC TROMETHAMINE 15 MG: 30 INJECTION, SOLUTION INTRAMUSCULAR; INTRAVENOUS at 23:47

## 2021-03-14 ASSESSMENT — PAIN SCALES - GENERAL
PAINLEVEL_OUTOF10: 7
PAINLEVEL_OUTOF10: 6

## 2021-03-14 ASSESSMENT — PAIN DESCRIPTION - LOCATION: LOCATION: ABDOMEN

## 2021-03-15 ENCOUNTER — TELEPHONE (OUTPATIENT)
Dept: SURGERY | Age: 51
End: 2021-03-15

## 2021-03-15 VITALS
HEART RATE: 69 BPM | RESPIRATION RATE: 16 BRPM | TEMPERATURE: 97.6 F | HEIGHT: 63 IN | DIASTOLIC BLOOD PRESSURE: 66 MMHG | WEIGHT: 235 LBS | OXYGEN SATURATION: 97 % | SYSTOLIC BLOOD PRESSURE: 106 MMHG | BODY MASS INDEX: 41.64 KG/M2

## 2021-03-15 LAB
ALBUMIN SERPL-MCNC: 3.5 GM/DL (ref 3.4–5)
ALP BLD-CCNC: 70 U/L (ref 46–116)
ALT SERPL-CCNC: 23 U/L (ref 14–63)
ANION GAP: 10 MEQ/L (ref 8–16)
AST SERPL-CCNC: 15 U/L (ref 15–37)
BILIRUB SERPL-MCNC: 0.6 MG/DL (ref 0.2–1)
BUN BLDV-MCNC: 15 MG/DL (ref 7–18)
CHLORIDE BLD-SCNC: 104 MEQ/L (ref 98–107)
CO2: 27 MEQ/L (ref 21–32)
CREAT SERPL-MCNC: 0.9 MG/DL (ref 0.6–1.3)
GFR, ESTIMATED: 70 ML/MIN/1.73M2
GLUCOSE BLD-MCNC: 121 MG/DL (ref 74–106)
LIPASE: 227 U/L (ref 73–393)
NT PRO BNP: 27 PG/ML (ref 0–900)
POC CALCIUM: 8.8 MG/DL (ref 8.5–10.1)
POTASSIUM SERPL-SCNC: 3.6 MEQ/L (ref 3.5–5.1)
SODIUM BLD-SCNC: 141 MEQ/L (ref 136–145)
TOTAL PROTEIN: 7.5 GM/DL (ref 6.4–8.2)
TROPONIN I: < 0.017 NG/ML (ref 0.02–0.06)

## 2021-03-15 PROCEDURE — 6360000004 HC RX CONTRAST MEDICATION: Performed by: EMERGENCY MEDICINE

## 2021-03-15 RX ORDER — HYDROCODONE BITARTRATE AND ACETAMINOPHEN 5; 325 MG/1; MG/1
1 TABLET ORAL EVERY 6 HOURS PRN
Qty: 15 TABLET | Refills: 0 | Status: SHIPPED | OUTPATIENT
Start: 2021-03-15 | End: 2021-03-20

## 2021-03-15 RX ORDER — ONDANSETRON 4 MG/1
4 TABLET, ORALLY DISINTEGRATING ORAL EVERY 8 HOURS PRN
Qty: 15 TABLET | Refills: 0 | Status: SHIPPED | OUTPATIENT
Start: 2021-03-15

## 2021-03-15 RX ADMIN — IOPAMIDOL 100 ML: 755 INJECTION, SOLUTION INTRAVENOUS at 00:25

## 2021-03-15 ASSESSMENT — PAIN DESCRIPTION - PROGRESSION: CLINICAL_PROGRESSION: RESOLVED

## 2021-03-15 ASSESSMENT — HEART SCORE: ECG: 0

## 2021-03-15 ASSESSMENT — PAIN SCALES - GENERAL
PAINLEVEL_OUTOF10: 0
PAINLEVEL_OUTOF10: 0

## 2021-03-15 NOTE — TELEPHONE ENCOUNTER
Patient was in the ER on 3/14/2021.  Called to make an appt to be seen in the office-patient would like to discuss it with her family MD first.

## 2021-03-15 NOTE — ED TRIAGE NOTES
Patient reported, \"abdominal pain started after I ate today. I took over the counter medication and it didn't help. \" Denied nausea, vomiting or diarrhea. Observed patient steadily ambulate to the room resp easy, pink, warm and dry.

## 2021-03-15 NOTE — ED NOTES
Care assumed for discharge. Pt given discharge instructions. Verbalized understanding and use of meds. Pt left ambulatory per self. Pt in stable condition.       Junella Riedel, RN  03/15/21 0456

## 2021-03-15 NOTE — ED PROVIDER NOTES
[address] [facility]  [facility] [address]  Phone: [facility]  COMPLAINT    Chief Complaint   Patient presents with    Abdominal Pain       HPI    Jennifer Murray is a 44 y.o. female who presents above-noted complaint. Patient is been doing pretty well although developed some belly pain and discomfort. She really points to her mid epigastric area for her discomfort. Thought it could be related to gallbladder belching or such and she was little concerned continued pain and came here. Denies high fever chills urinary symptoms bowel bladder habit changes or other issues. No exertional pain. PAST MEDICAL HISTORY    Past Medical History:   Diagnosis Date    Depression     Diabetes mellitus ([facility] [address]     patient states prediabetes    Hypertension     Hypothyroidism     Multiple sclerosis ([facility] [address]        SURGICAL HISTORY    Past Surgical History:   Procedure Laterality Date    BREAST SURGERY  2012    biopsy     SECTION      COLONOSCOPY      last one     DILATION AND CURETTAGE OF UTERUS  7/15/2014    ENDOMETRIAL ABLATION  7/15/2014    EYE SURGERY  ? ?     lasik, bilateral    FACIAL SURGERY Right 2020    EXCISION CYST RIGHT LOWER LIP performed by David Arriaza MD at [address] [place] [address] Right 2013    DEBRIDEMENT RT. ACHILLES TENDON WITH TOPAZ RT. ANKLE    HYSTERECTOMY ABDOMINAL N/A 2019    ROBOTIC HYSTERECTOMY WITH LEFT SALPINGO-OOPHORECTOMY performed by Toby Wetzel MD at [place] [address] N/A 2018    DILATATION AND CURETTAGE HYSTEROSCOPY performed by Toby Wetzel MD at [address] [place] [address] SALPINGO-OOPHORECTOMY Right 4/28/15    TONSILLECTOMY      at age 10 years    TUBAL LIGATION      UPPER GASTROINTESTINAL ENDOSCOPY      WISDOM TOOTH EXTRACTION         CURRENT MEDICATIONS    Current Outpatient Rx   Medication Sig Dispense Refill    HYDROcodone-acetaminophen (NORCO) 5-325 MG per tablet Take 1 tablet by mouth every 6 hours as needed for Pain for up to 5 days. 15 tablet 0    ondansetron (ZOFRAN ODT) 4 MG disintegrating tablet Take 1 tablet by mouth every 8 hours as needed for Nausea or Vomiting 15 tablet 0    raNITIdine HCl (ZANTAC 75 PO) Take by mouth      atorvastatin (LIPITOR) 10 MG tablet Take 10 mg by mouth daily      baclofen (LIORESAL) 10 MG tablet Take 1 tablet by mouth 2 times daily 60 tablet 1    glatiramer (COPAXONE) 40 MG/ML injection INJECT 40 MG UNDER THE SKIN THREE TIMES A WEEK 36 Syringe 3    Cholecalciferol (VITAMIN D3) 5000 units TABS Take by mouth nightly      Omega-3 Fatty Acids (OMEGA-3 CF PO) Take 4 tablets by mouth nightly      metFORMIN (GLUCOPHAGE) 500 MG tablet Take 2,000 mg by mouth daily (with breakfast)       lisinopril (PRINIVIL;ZESTRIL) 20 MG tablet Take 10 mg by mouth daily       amLODIPine (NORVASC) 5 MG tablet Take 5 mg by mouth nightly 1/2 tab am      EVENING PRIMROSE OIL PO Take  by mouth daily.  Calcium Carbonate-Vitamin D (CALCIUM + D) 600-200 MG-UNIT TABS Take 3 tablets by mouth daily.  citalopram (CELEXA) 20 MG tablet Take 20 mg by mouth daily.  levothyroxine (SYNTHROID) 75 MCG tablet Take 75 mcg by mouth daily.  temazepam (RESTORIL) 15 MG capsule Take 15 mg by mouth nightly as needed for Sleep.       NONFORMULARY as needed Indications: C-Testosterone 2%>Petrolatum         ALLERGIES    No Known Allergies    FAMILY HISTORY    Family History   Problem Relation Age of Onset    Heart Disease Mother     High Blood Pressure Mother     High Cholesterol Mother     Miscarriages / Stillbirths Mother     Stroke Mother     Vision Loss Mother     Cancer Mother         skin    Arthritis Mother     Depression Father     Diabetes Father     Hearing Loss Father     Heart Disease Father     High Blood Pressure Father     Stroke Father     Substance Abuse Father alcohol    Cancer Father         skin    Heart Disease Maternal Grandmother        SOCIAL HISTORY    Social History     Socioeconomic History    Marital status:      Spouse name: None    Number of children: None    Years of education: None    Highest education level: None   Occupational History    None   Social Needs    Financial resource strain: None    Food insecurity     Worry: None     Inability: None    Transportation needs     Medical: None     Non-medical: None   Tobacco Use    Smoking status: Never Smoker    Smokeless tobacco: Never Used   Substance and Sexual Activity    Alcohol use: Yes     Alcohol/week: 2.0 standard drinks     Types: 2 Glasses of wine per week     Comment: weekly    Drug use: No    Sexual activity: Yes     Partners: Male   Lifestyle    Physical activity     Days per week: None     Minutes per session: None    Stress: None   Relationships    Social connections     Talks on phone: None     Gets together: None     Attends [employer] service: None     Active member of club or organization: None     Attends meetings of clubs or organizations: None     Relationship status: None    Intimate partner violence     Fear of current or ex partner: None     Emotionally abused: None     Physically abused: None     Forced sexual activity: None   Other Topics Concern    None   Social History Narrative    None       REVIEW OF SYSTEMS    Positive for midepigastric pain. No high fever chills cough or respiratory symptoms. No bowel or bladder habit changes. All systems negative except as marked.      PHYSICAL EXAM    VITAL SIGNS: /74   Pulse 68   Temp 97.6 °F (36.4 °C) (Oral)   Resp 14   Ht 5' 3\" (1.6 m)   Wt 235 lb (106.6 kg)   LMP 04/24/2015   SpO2 100%   BMI 41.63 kg/m²    Constitutional:  Alert not toxic or ill, overweight not toxic or ill pleasant  HENT: COVID mask protection in place normocephalic, Atraumatic, mask lowered to assess  Bilateral external ears appropriate to reduce radiation to a low as reasonably achievable. XR CHEST (2 VW)   Final Result   1. No acute findings. This document has been electronically signed by: Wayne Wei MD on    03/15/2021 12:24 AM          PROCEDURES    none      CONSULTS:  None      CRITICAL CARE:  None    SCREENINGS  /74   Pulse 68   Temp 97.6 °F (36.4 °C) (Oral)   Resp 14   Ht 5' 3\" (1.6 m)   Wt 235 lb (106.6 kg)   LMP 04/24/2015   SpO2 100%   BMI 41.63 kg/m²        Screening For Hypertension and Follow-up (#317)   previously diagnosed with hypertension and not applicable for screen      Screening For Tobacco Use and Cessation Intervention (#226):   reports that she has never smoked. She has never used smokeless tobacco.  Non-smoker not applicable for screen    ED COURSE & MEDICAL DECISION MAKING    Pertinent Labs & Imaging studies reviewed. (See chart for details)  Patient presents with upper abdominal pain and discomfort starting around noon today. She had some leftover chicken Parmesan. Otherwise been doing okay. Felt like a heaviness and tightness in the midepigastric area. Thought it could be her gallbladder was not really sure. Denies vomiting or fever or other issues. REASSESSMENT  12:29 AM  Patient rechecked and updated on lab/xray status, progress and results. .  Patient was reassessed and condition was improved after tx. No further needs at this time. Lab tests are reassuring including lipase cardiac enzymes and white count. CT abdomen pelvis to assess his midepigastric pain and discomfort was obtained and does show a tiny gallbladder stone near the neck. There is no signs of infection. There is no other findings and actually improved liver steatosis. Patient is received some Toradol here she is 100% improved. She feels able to go home. Discussed differential diagnosis with the patient including coronary disease, peptic ulcer disease and gallbladder disease.   With some postprandial pain and gallstones on CT likely source of her discomfort. Counseled her that she needs close follow-up with outpatient follow-up medical and surgical referral.  May benefit from outpatient ultrasound of her abdomen as well. Recommend some Norco and Zofran if her pain returns tomorrow morning. Following up with PCP. In regards to cardiac risk she does have a history of hypertension and diabetes although her history and clinical findings are benign with normal EKG and normal enzymes      FINAL IMPRESSION    1. Epigastric pain    2. Biliary colic    3. Gallstones         PATIENT REFERRED TO:  Hubert Acuna MD  [address] [place] [address]  [phone]    Call in 1 day        DISCHARGE MEDICATIONS:  New Prescriptions    HYDROCODONE-ACETAMINOPHEN (NORCO) 5-325 MG PER TABLET    Take 1 tablet by mouth every 6 hours as needed for Pain for up to 5 days.     ONDANSETRON (ZOFRAN ODT) 4 MG DISINTEGRATING TABLET    Take 1 tablet by mouth every 8 hours as needed for Nausea or Vomiting           Jh Crocker MD  03/15/21 0108

## 2021-06-13 NOTE — PROGRESS NOTES
Medical History:   Diagnosis Date    Depression     Diabetes mellitus ([facility] [address]     patient states prediabetes    Hypertension     Hypothyroidism     Multiple sclerosis ([facility] [address]        Past Surgical History:   Procedure Laterality Date    BREAST SURGERY  2012    biopsy     SECTION      COLONOSCOPY      last one     DILATION AND CURETTAGE OF UTERUS  7/15/2014    ENDOMETRIAL ABLATION  7/15/2014    EYE SURGERY  ? ? lasik, bilateral    FACIAL SURGERY Right 2020    EXCISION CYST RIGHT LOWER LIP performed by Clint Bonds MD at [address] [facility] Right 2013    DEBRIDEMENT RT. ACHILLES TENDON WITH TOPAZ RT. ANKLE    HYSTERECTOMY ABDOMINAL N/A 2019    ROBOTIC HYSTERECTOMY WITH LEFT SALPINGO-OOPHORECTOMY performed by Lauri Block MD at [facility] [place] HYSTEROSCOPY,W/ENDO BX N/A 2018    DILATATION AND CURETTAGE HYSTEROSCOPY performed by Lauri Block MD at [facility] SALPINGO-OOPHORECTOMY Right 4/28/15    TONSILLECTOMY      at age 10 years    TUBAL LIGATION      UPPER GASTROINTESTINAL ENDOSCOPY      WISDOM TOOTH EXTRACTION         No Known Allergies    Current Outpatient Medications   Medication Sig Dispense Refill    nystatin-triamcinolone (MYCOLOG II) 966128-5.1 UNIT/GM-% cream Apply topically 4 times daily Apply topically 4 times daily.  clobetasol (TEMOVATE) 0.05 % cream Apply topically 2 times daily Apply topically 2 times daily.  Azelaic Acid (FINACEA) 15 % GEL Apply topically See Admin Instructions      atorvastatin (LIPITOR) 10 MG tablet Take 10 mg by mouth daily      glatiramer (COPAXONE) 40 MG/ML injection INJECT 40 MG UNDER THE SKIN THREE TIMES A WEEK 36 Syringe 3    Cholecalciferol (VITAMIN D3) 5000 units TABS Take by mouth nightly      Omega-3 Fatty Acids (OMEGA-3 CF PO) Take 4 tablets by mouth nightly      temazepam (RESTORIL) 15 MG capsule Take 15 mg by mouth nightly as needed for Sleep.  NONFORMULARY as needed Indications: C-Testosterone 2%>Petrolatum      metFORMIN (GLUCOPHAGE) 500 MG tablet Take 2,000 mg by mouth daily (with breakfast)       lisinopril (PRINIVIL;ZESTRIL) 20 MG tablet Take 10 mg by mouth daily       amLODIPine (NORVASC) 5 MG tablet Take 5 mg by mouth nightly 1/2 tab am      EVENING PRIMROSE OIL PO Take  by mouth daily.  Calcium Carbonate-Vitamin D (CALCIUM + D) 600-200 MG-UNIT TABS Take 3 tablets by mouth daily.  citalopram (CELEXA) 20 MG tablet Take 20 mg by mouth daily.  levothyroxine (SYNTHROID) 75 MCG tablet Take 75 mcg by mouth daily.  ondansetron (ZOFRAN ODT) 4 MG disintegrating tablet Take 1 tablet by mouth every 8 hours as needed for Nausea or Vomiting 15 tablet 0    raNITIdine HCl (ZANTAC 75 PO) Take by mouth (Patient not taking: Reported on 7/7/2021)      baclofen (LIORESAL) 10 MG tablet Take 1 tablet by mouth 2 times daily (Patient not taking: Reported on 7/7/2021) 60 tablet 1     No current facility-administered medications for this visit. Family History   Problem Relation Age of Onset    Heart Disease Mother     High Blood Pressure Mother     High Cholesterol Mother    Elinda Lipps / Stillbirths Mother     Stroke Mother     Vision Loss Mother     Cancer Mother         skin    Arthritis Mother     Depression Father     Diabetes Father     Hearing Loss Father     Heart Disease Father     High Blood Pressure Father     Stroke Father     Substance Abuse Father         alcohol    Cancer Father         skin    Heart Disease Maternal Grandmother         Review of Systems:   General/Constitutional: She lost approximately 10 pounds of weight in the last 1 year with a normal appetite. No fever or chills. HENT: Negative. Eyes: Negative. Upper respiratory tract: No nasal stuffiness or post nasal drip. Lower respiratory tract/ lungs: No cough or sputum production. No hemoptysis.   Cardiovascular: No palpitations or chest pain.  Gastrointestinal: No nausea or vomiting. Neurological: No focal neurologiacal weakness. Extremities: No edema. Musculoskeletal: No complaints. Genitourinary: No complaints. Hematological: Negative. Psychiatric/Behavioral: Negative. Skin: No itching. /78 (Site: Left Lower Arm, Position: Sitting, Cuff Size: Medium Adult)   Pulse 64   Temp 97.9 °F (36.6 °C)   Ht 5' 3.5\" (1.613 m)   Wt 243 lb 6.4 oz (110.4 kg)   LMP 04/24/2015   SpO2 98% Comment: room air at rest  BMI 42.44 kg/m²   Mallampati airway Class:3  Neck Circumference:.17 Inches  [place] sleepiness score 7/7/21: 14  Sleep Apnea Quality Of Life Questionnaire:.73      Physical Exam   Nursing note and vitals reviewed. Constitutional: Patient appears moderately built and moderately nourished. No distress. Patient is oriented to person, place, and time. HENT:   Head: Normocephalic and atraumatic. Right Ear: External ear normal.   Left Ear: External ear normal.   Mouth/Throat: Oropharynx is clear and moist.  No oral thrush. Eyes: Conjunctivae are normal. Pupils are equal, round, and reactive to light. No scleral icterus. Neck: Neck supple. No JVD present. No tracheal deviation present. Cardiovascular: Normal rate, regular rhythm, normal heart sounds. No murmur heard. Pulmonary/Chest: Effort normal and breath sounds normal. No stridor. No respiratory distress. No wheezes. No rales. Patient exhibits no tenderness. Abdominal: Soft. Patient exhibits no distension. No tenderness. Musculoskeletal: Normal range of motion. Extremities: Patient exhibits no edema and no tenderness. Lymphadenopathy:  No cervical adenopathy. Neurological: Patient is alert and oriented to person, place, and time. Skin: Skin is warm and dry. Patient is not diaphoretic. Psychiatric: Patient  has a normal mood and affect. Patient behavior is normal.     Diagnostic Data:  None related sleep.       Assessment:  -Snoring without witnessed apneas, nocturnal awakenings and excessive daytime sleepiness to evaluate for obstructive sleep apnea. -Inadequate sleep hygiene.  -Hypersomnia ( Excessive daytime sleepiness) may be due to obstructive sleep apnea Vs Inadequate sleep hygiene Vs her current medications with the sedation as side effect including Restoril and Celexa. -Multiple sclerosis. She is currently following with Dr. Elida Starr MD.  She is currently on treatment with Copaxone 40 mg subcutaneously 3 times a week. -Depression on treatment with medications.  -Essential hypertension on treatment medications.  -Hypothyroidism on supplementation.  -Type 2 diabetes mellitus. She is currently on treatment with Metformin    Recommendations/Plan:  -Will schedule patient for polysomnogram in the sleep lab. -She was advised to stop taking Restoril (temazepam) after discussing with her family physician until her sleep study reports were available. Ponce Keaau detailed about the side effects of long term use of hypnotics including Restoril. She agreed to take the risk. Shewas also advised to take Restoril on PRN basis only with intermittent dosing. She was advised to not to drive any motor vehicles or operate heavy equipment after taking the above medication. She verbalizes understanding.  -I had a discussion with patient regarding avialable treatment options for her sleep disorder breathing including but not limited to CPAP titration in the sleep lab Vs.Dental appliance placement with referral to a local dentist Vs other available surgical options including Uvulopalatopharyngoplasty, maxillomandibular ostomy and tracheostomy as last option.  At the end of discussion, she is not decided on her treatment if she found to have obstructive sleep apnea at this time.  -We will see [address] back in 1week after the sleep study to go over the sleep study results and further management options.  -She was educated to practice good sleep hygiene

## 2021-06-14 ENCOUNTER — HOSPITAL ENCOUNTER (OUTPATIENT)
Dept: ULTRASOUND IMAGING | Age: 51
Discharge: HOME | End: 2021-06-14
Payer: COMMERCIAL

## 2021-06-14 DIAGNOSIS — R10.11 RUQ ABDOMINAL PAIN: ICD-10-CM

## 2021-06-14 PROCEDURE — 76705 ECHO EXAM OF ABDOMEN: CPT

## 2021-07-07 ENCOUNTER — INITIAL CONSULT (OUTPATIENT)
Dept: PULMONOLOGY | Age: 51
End: 2021-07-07
Payer: COMMERCIAL

## 2021-07-07 VITALS
TEMPERATURE: 97.9 F | BODY MASS INDEX: 41.55 KG/M2 | HEART RATE: 64 BPM | OXYGEN SATURATION: 98 % | SYSTOLIC BLOOD PRESSURE: 128 MMHG | HEIGHT: 64 IN | DIASTOLIC BLOOD PRESSURE: 78 MMHG | WEIGHT: 243.4 LBS

## 2021-07-07 DIAGNOSIS — G47.10 HYPERSOMNIA: ICD-10-CM

## 2021-07-07 DIAGNOSIS — F32.A DEPRESSION, UNSPECIFIED DEPRESSION TYPE: ICD-10-CM

## 2021-07-07 DIAGNOSIS — I10 ESSENTIAL HYPERTENSION: ICD-10-CM

## 2021-07-07 DIAGNOSIS — R06.83 SNORING: Primary | ICD-10-CM

## 2021-07-07 DIAGNOSIS — G35 MULTIPLE SCLEROSIS (HCC): ICD-10-CM

## 2021-07-07 PROCEDURE — 99204 OFFICE O/P NEW MOD 45 MIN: CPT | Performed by: INTERNAL MEDICINE

## 2021-07-07 RX ORDER — CLOBETASOL PROPIONATE 0.5 MG/G
CREAM TOPICAL 2 TIMES DAILY
COMMUNITY

## 2021-07-07 RX ORDER — AZELAIC ACID 0.15 G/G
GEL TOPICAL SEE ADMIN INSTRUCTIONS
COMMUNITY

## 2021-07-07 NOTE — PROGRESS NOTES
Chief Complaint: Neno Mathew is a new sleep consult referred by Dr Claudia Sargent for snoring    Mallampati airway Class:3  Neck Circumference:.17 Inches    [place] sleepiness score 7/7/21: 14  Sleep Apnea Quality Of Life Questionnaire:.73

## 2021-07-07 NOTE — PATIENT INSTRUCTIONS
Recommendations/Plan:  -Will schedule patient for polysomnogram in the sleep lab. -She was advised to stop taking Restoril (temazepam) after discussing with her family physician until her sleep study reports were available. Irwin Garcia detailed about the side effects of long term use of hypnotics including Restoril. She agreed to take the risk. Shewas also advised to take Restoril on PRN basis only with intermittent dosing. She was advised to not to drive any motor vehicles or operate heavy equipment after taking the above medication. She verbalizes understanding.  -I had a discussion with patient regarding avialable treatment options for her sleep disorder breathing including but not limited to CPAP titration in the sleep lab Vs.Dental appliance placement with referral to a local dentist Vs other available surgical options including Uvulopalatopharyngoplasty, maxillomandibular ostomy and tracheostomy as last option. At the end of discussion, she is not decided on her treatment if she found to have obstructive sleep apnea at this time.  -We will see [address] back in 1week after the sleep study to go over the sleep study results and further management options.  -She was educated to practice good sleep hygiene practices. She was provided with a good sleep hygiene hand out.  -Ansley Wadsworth was advised to make earlier appointment with my clinic if she develops any worsening of sleep symptoms. She verbalizes understanding.  -Ansley Wadsworth was advised to not to drive any motor vehicles or operate heavy equipment until her sleep symptoms are under good control. Courtney Torres verbalizes understanding.  -She was advised to loose weight by controlling diet and doing exercise once cleared by her family physician.   - [address] was educated about my impression and plan. She verbalizes understanding.

## 2021-07-23 ENCOUNTER — HOSPITAL ENCOUNTER (OUTPATIENT)
Dept: NUCLEAR MEDICINE | Age: 51
Discharge: HOME | End: 2021-07-23
Payer: COMMERCIAL

## 2021-07-23 DIAGNOSIS — R10.11 RIGHT UPPER QUADRANT PAIN: ICD-10-CM

## 2021-07-23 PROCEDURE — A9537 TC99M MEBROFENIN: HCPCS | Performed by: NURSE PRACTITIONER

## 2021-07-23 PROCEDURE — 3430000000 HC RX DIAGNOSTIC RADIOPHARMACEUTICAL: Performed by: NURSE PRACTITIONER

## 2021-07-23 PROCEDURE — 78226 HEPATOBILIARY SYSTEM IMAGING: CPT

## 2021-07-23 RX ADMIN — Medication 6.3 MILLICURIE: at 07:45

## 2021-08-06 ENCOUNTER — HOSPITAL ENCOUNTER (OUTPATIENT)
Dept: SLEEP CENTER | Age: 51
Discharge: HOME | End: 2021-08-08
Payer: COMMERCIAL

## 2021-08-06 DIAGNOSIS — G47.10 HYPERSOMNIA: ICD-10-CM

## 2021-08-06 DIAGNOSIS — G35 MULTIPLE SCLEROSIS (HCC): ICD-10-CM

## 2021-08-06 DIAGNOSIS — I10 ESSENTIAL HYPERTENSION: ICD-10-CM

## 2021-08-06 DIAGNOSIS — R06.83 SNORING: ICD-10-CM

## 2021-08-06 DIAGNOSIS — F32.A DEPRESSION, UNSPECIFIED DEPRESSION TYPE: ICD-10-CM

## 2021-08-06 PROCEDURE — 95810 POLYSOM 6/> YRS 4/> PARAM: CPT

## 2021-08-09 LAB — STATUS: NORMAL

## 2021-08-11 NOTE — PROGRESS NOTES
[address] [place] [address]                               SLEEP STUDY REPORT    PATIENT NAME: Rajesh Cohen                  :        1970  MED REC NO:   [id number]                           ROOM:  ACCOUNT NO:   [de-identified]                           ADMIT DATE: 2021  PROVIDER:     Michael Samson. MD Boyd    DATE OF STUDY:  2021    REFERRING PROVIDER:  Gerald Vizcaino MD    The patient's height is 63.5 inches, weight is 243 pounds with a BMI of  42.4. HISTORY:  The patient is a 78-year-old female who was initially  evaluated by me on 2021. The patient is currently suffering from  hypersomnia with [place] Sleepiness Score of 14. The patient gave a  history of snoring without witnessed apneas. The patient was scheduled  for sleep study to evaluate for sleep apnea as an etiology for  hypersomnia. The patient had associated comorbidities including  multiple sclerosis, depression, and hypertension. METHODS:  The patient underwent digital polysomnography in compliance  with the standards and specifications from the AASM Manual including the  simultaneous recording of 3 EEG channels (F4-M1, C4-M1, and O2-M1 with  back up electrodes F3-M2, C3-M2, and O1-M2), 2 EOG channels (E1-M2, and  E2-M1,), EMG (chin, left & right leg), EKG, Nonin pulse oximetry with   less than 2 second averaging time, body position, airflow recorded by  oral-nasal thermal sensor and nasal air pressure transducer, plus  respiratory effort recorded by calibrated respiratory inductance  plethysmography (RIP), flow volume loop, sound and video. Sleep staging  and scoring followed the standard put forth by the American Academy of  Sleep Medicine and utilized the 1B obstructive hypopnea event  desaturation of 4 percent or greater. INTERPRETATION:  This is a baseline sleep study, and the study was  performed on 2021.   The study was started at 09:48 p.m. and was  terminated at 05:16 a.m. with a total recording time of 447.5 minutes,  sleep period time was 447.1 minutes, and total sleep time was 431.1  minutes. Overall sleep efficiency was 96.3%. The sleep onset latency  was 0.5 minutes, and wake after sleep onset was 16 minutes, and REM  sleep latency was 98 minutes. SLEEP STAGING AND DISTRIBUTION SUMMARY:  Revealed the patient spent 13  minutes in stage I consisting of 3% of total sleep time, 244.1 minutes  in stage II consisting of 56.6%, 58.5 minutes in stage III consisting of  13.6%, 115.5 minutes in REM sleep consisting of 26.8% of total sleep  time. RESPIRATORY EVENT ANALYSIS:  Revealed the patient had a total of 7  apneas, all of them were obstructive in nature. The patient also had a  total of 79 hypopneas, all of them were obstructive in nature. The  total number of apneas and hypopneas recorded during the study was 86  with an apnea-hypopnea index of 12. The patient's REM sleep  apnea-hypopnea index was 31.7. POSITION ANALYSIS:  Revealed the patient spent 396.5 minutes in supine  position, 34.6 minutes in right lateral position. The supine  apnea-hypopnea index was 12.6 whereas right lateral position  apnea-hypopnea index was 1.7. PERIODIC LIMB MOVEMENT ANALYSIS:  Revealed the patient had a total of 0  periodic limb movements. The patient had a total of 31 spontaneous  arousals with a spontaneous arousal index of 4.3. OXYGEN SATURATION MONITORING:  Revealed the patient had a maximum oxygen  desaturation to 79% with a mean oxygen saturation of 93.6%. The patient  spent a total of 3.8 minutes below oxygen saturation less than 88%. EKG MONITORING:  Revealed normal sinus rhythm. The patient was found to have moderate snoring during the sleep study. IMPRESSION:  1. Mild obstructive sleep apnea with worsening of respiratory events  during REM sleep. 2.  Hypersomnia, most likely due to sleep apnea.   3. Multiple sclerosis, currently following with Manjinder Martell MD.  4.  Depression, on treatment with medications. 5.  Essential hypertension. 6.  Hypothyroidism. RECOMMENDATIONS:  The patient should be scheduled for followup with my  clinic as soon as possible to discuss about the sleep study findings for  further management. Thanks to Manjinder Martell MD, for giving me this opportunity to  participate in the care of this pleasant lady.         Renetta Landry MD    D: 08/10/2021 18:52:58       T: 08/11/2021 4:43:38     [id number]  Job#: [id number]     Doc#: [id number]    CC:

## 2021-09-19 NOTE — PROGRESS NOTES
[place] [facility] [employer] [employer] Follow up note      Obed Jacinto                                            Chief Complaint and Ambler: Obed Jacinto is a 46 y. o.oldfemale came for follow up regarding her sleep study results. She underwent sleep study on 2021. She still complaining of excessive day time sleepiness with no improvement compared to last visit. Review of Systems:   General/Constitutional: she remain at same weight from the last visit with normal appetite. No fever or chills. HENT: Negative. Eyes: Negative. Upper respiratory tract: No nasal stuffiness or post nasal drip. Lower respiratory tract/ lungs: No cough or sputum production. No hemoptysis. Cardiovascular: No palpitations or chest pain. Gastrointestinal: No nausea or vomiting. Neurological: No focal neurologiacal weakness. Extremities: No edema. Musculoskeletal: No complaints. Genitourinary: No complaints. Hematological: Negative. Psychiatric/Behavioral: Negative. Skin: No itching. Past Medical History:   Diagnosis Date    Depression     Diabetes mellitus ([facility]     patient states prediabetes    Hypertension     Hypothyroidism     Multiple sclerosis ([facility] [address]        Past Surgical History:   Procedure Laterality Date    BREAST SURGERY  2012    biopsy     SECTION      COLONOSCOPY      last one     DILATION AND CURETTAGE OF UTERUS  7/15/2014    ENDOMETRIAL ABLATION  7/15/2014    EYE SURGERY  ? ?     lasik, bilateral    FACIAL SURGERY Right 2020    EXCISION CYST RIGHT LOWER LIP performed by Georgina Zaman MD at [address] [place] [address] Right 2013    DEBRIDEMENT RT. ACHILLES TENDON WITH TOPAZ RT. ANKLE    HYSTERECTOMY ABDOMINAL N/A 2019    ROBOTIC HYSTERECTOMY WITH LEFT SALPINGO-OOPHORECTOMY performed by Aysha Herrera MD at [facility] HYSTEROSCOPY,W/ENDO BX N/A 2018    DILATATION AND CURETTAGE HYSTEROSCOPY performed by Erica Aleman MD at [facility] SALPINGO-OOPHORECTOMY Right 4/28/15    TONSILLECTOMY      at age 10 years   [facility] TUBAL LIGATION      UPPER GASTROINTESTINAL ENDOSCOPY      WISDOM TOOTH EXTRACTION         Social History     Tobacco Use    Smoking status: Never Smoker    Smokeless tobacco: Never Used   Vaping Use    Vaping Use: Never used   Substance Use Topics    Alcohol use: Yes     Alcohol/week: 2.0 standard drinks     Types: 2 Glasses of wine per week     Comment: weekly    Drug use: No       No Known Allergies    Current Outpatient Medications   Medication Sig Dispense Refill    nystatin-triamcinolone (MYCOLOG II) 988536-7.1 UNIT/GM-% cream Apply topically 4 times daily Apply topically 4 times daily.  clobetasol (TEMOVATE) 0.05 % cream Apply topically 2 times daily Apply topically 2 times daily.  Azelaic Acid (FINACEA) 15 % GEL Apply topically See Admin Instructions      ondansetron (ZOFRAN ODT) 4 MG disintegrating tablet Take 1 tablet by mouth every 8 hours as needed for Nausea or Vomiting 15 tablet 0    raNITIdine HCl (ZANTAC 75 PO) Take by mouth       atorvastatin (LIPITOR) 10 MG tablet Take 10 mg by mouth daily      glatiramer (COPAXONE) 40 MG/ML injection INJECT 40 MG UNDER THE SKIN THREE TIMES A WEEK 36 Syringe 3    Cholecalciferol (VITAMIN D3) 5000 units TABS Take by mouth nightly      Omega-3 Fatty Acids (OMEGA-3 CF PO) Take 4 tablets by mouth nightly      temazepam (RESTORIL) 15 MG capsule Take 15 mg by mouth nightly as needed for Sleep.  NONFORMULARY as needed Indications: C-Testosterone 2%>Petrolatum      metFORMIN (GLUCOPHAGE) 500 MG tablet Take 2,000 mg by mouth daily (with breakfast)       lisinopril (PRINIVIL;ZESTRIL) 20 MG tablet Take 10 mg by mouth daily       amLODIPine (NORVASC) 5 MG tablet Take 5 mg by mouth nightly 1/2 tab am      EVENING PRIMROSE OIL PO Take  by mouth daily.         Calcium Carbonate-Vitamin D (CALCIUM + D) 600-200 MG-UNIT TABS Take 3 tablets by mouth daily.  citalopram (CELEXA) 20 MG tablet Take 20 mg by mouth daily.  levothyroxine (SYNTHROID) 75 MCG tablet Take 75 mcg by mouth daily. No current facility-administered medications for this visit. Family History   Problem Relation Age of Onset    Heart Disease Mother     High Blood Pressure Mother     High Cholesterol Mother    [de-identified] / Stillbirths Mother     Stroke Mother     Vision Loss Mother     Cancer Mother         skin    Arthritis Mother     Depression Father     Diabetes Father     Hearing Loss Father     Heart Disease Father     High Blood Pressure Father     Stroke Father     Substance Abuse Father         alcohol    Cancer Father         skin    Heart Disease Maternal Grandmother           /78 (Site: Left Lower Arm, Position: Sitting, Cuff Size: Medium Adult)   Pulse 70   Temp 97.2 °F (36.2 °C)   Ht 5' 4\" (1.626 m)   Wt 243 lb 3.2 oz (110.3 kg)   LMP 04/24/2015   SpO2 98% Comment: room air at rest  BMI 41.75 kg/m²     BMI:  Body mass index is 41.75 kg/m². Mallampati airway Class:3  Neck Circumference:.17 Inches  [place] sleepiness score 10/15/21: .16  Sleep apnea quality  of life questionnaire:70      Physical Exam :  Constitutional: Patient appears moderately built and moderately nourished. No distress. Patient is oriented to person, place, and time. HENT:   Head: Normocephalic and atraumatic. Right Ear: External ear normal.   Left Ear: External ear normal.   Mouth/Throat: Oropharynx is clear and moist.   Eyes: Conjunctivae are normal. Pupils are equal and reactive to light. No scleral icterus. Neck: Neck supple. No JVD present. Cardiovascular: Normal rate, regular rhythm, normal heart sounds. No murmur heard. Pulmonary/Chest: Effort normal and breath sounds normal. No stridor. No respiratory distress. No wheezes. No rales. Abdominal: Soft.  Patient exhibits no distension. No tenderness. Musculoskeletal: Normal range of motion. Extremities: Patient exhibits no erythema or no edema. Lymphadenopathy:  No cervical adenopathy. Neurological: Patient is alert and oriented to person, place, and time. Skin: Skin is warm and dry. Patient is not diaphoretic. Psychiatric: Patient  has a normal mood and affect. Diagnostic Data:    Sleep study done on :     SLEEP STUDY REPORT     PATIENT NAME: iXomara Jc                  :        1970  MED REC NO:   [id number]                           ROOM:  ACCOUNT NO:   [de-identified]                           ADMIT DATE: 2021  PROVIDER:     David Nix MD     DATE OF STUDY:  2021     REFERRING PROVIDER:  Theresa Cockayne, MD  IMPRESSION:  1. Mild obstructive sleep apnea with worsening of respiratory events  during REM sleep. 2.  Hypersomnia, most likely due to sleep apnea. 3.  Multiple sclerosis, currently following with Xi Santo MD.  4.  Depression, on treatment with medications. 5.  Essential hypertension. 6.  Hypothyroidism. Assesment:  - Mild obstructive sleep apnea with worsening of respiratory events during REM sleep -Newly diagnosed. -Hypersomnia ( Excessive daytime sleepiness) due to obstructive sleep apnea Vs her current medications with the sedation as side effect including Restoril and Celexa. -Multiple sclerosis. She is currently following with Dr. Theresa Cockayne, MD.  She is currently on treatment with Copaxone 40 mg subcutaneously 3 times a week. -Depression on treatment with medications.  -Essential hypertension on treatment medications.  -Hypothyroidism on supplementation.  -Type 2 diabetes mellitus.   She is currently on treatment with Metformin      Recommendations/Plan:  -I had a discussion with patient regarding avialable treatment options for her sleep disorder breathing including but not limited to CPAP titration in the sleep lab Vs.Dental appliance

## 2021-09-23 ENCOUNTER — HOSPITAL ENCOUNTER (OUTPATIENT)
Age: 51
Discharge: HOME | End: 2021-09-23
Payer: COMMERCIAL

## 2021-09-23 LAB
BILIRUBIN URINE: NEGATIVE
BLOOD, URINE: NEGATIVE
CHARACTER, URINE: CLEAR
COLOR: YELLOW
GLUCOSE URINE: NEGATIVE MG/DL
KETONES, URINE: NEGATIVE
LEUKOCYTE ESTERASE, URINE: NEGATIVE
NITRITE, URINE: NEGATIVE
PH UA: 6.5 (ref 5–9)
PROTEIN UA: NEGATIVE
SPECIFIC GRAVITY, URINE: 1.02 (ref 1–1.03)
UROBILINOGEN, URINE: 0.2 EU/DL (ref 0–1)

## 2021-09-23 PROCEDURE — 81003 URINALYSIS AUTO W/O SCOPE: CPT

## 2021-10-15 ENCOUNTER — HOSPITAL ENCOUNTER (OUTPATIENT)
Dept: WOMENS IMAGING | Age: 51
Discharge: HOME | End: 2021-10-15
Payer: COMMERCIAL

## 2021-10-15 ENCOUNTER — OFFICE VISIT (OUTPATIENT)
Dept: PULMONOLOGY | Age: 51
End: 2021-10-15
Payer: COMMERCIAL

## 2021-10-15 VITALS
WEIGHT: 243.2 LBS | SYSTOLIC BLOOD PRESSURE: 118 MMHG | OXYGEN SATURATION: 98 % | DIASTOLIC BLOOD PRESSURE: 78 MMHG | TEMPERATURE: 97.2 F | HEIGHT: 64 IN | HEART RATE: 70 BPM | BODY MASS INDEX: 41.52 KG/M2

## 2021-10-15 DIAGNOSIS — G47.10 HYPERSOMNIA: Primary | ICD-10-CM

## 2021-10-15 DIAGNOSIS — G35 MULTIPLE SCLEROSIS (HCC): ICD-10-CM

## 2021-10-15 DIAGNOSIS — I10 ESSENTIAL HYPERTENSION: ICD-10-CM

## 2021-10-15 DIAGNOSIS — G47.33 OSA (OBSTRUCTIVE SLEEP APNEA): ICD-10-CM

## 2021-10-15 DIAGNOSIS — Z12.31 VISIT FOR SCREENING MAMMOGRAM: ICD-10-CM

## 2021-10-15 PROCEDURE — 77063 BREAST TOMOSYNTHESIS BI: CPT

## 2021-10-15 PROCEDURE — 99213 OFFICE O/P EST LOW 20 MIN: CPT | Performed by: INTERNAL MEDICINE

## 2021-10-15 NOTE — PATIENT INSTRUCTIONS
Recommendations/Plan:  -I had a discussion with patient regarding avialable treatment options for her sleep disorder breathing including but not limited to CPAP titration in the sleep lab Vs.Dental appliance placement with referral to a local dentist Vs other available surgical options including Uvulopalatopharyngoplasty, maxillomandibular ostomy and tracheostomy as last option. At the end of discussion, she decided to go for the CPAP titration.  -Schedule patient for CPAP titration at [facility] sleep lab as soon as possible.  -She was advised to keep good compliance with recommended positive airway pressure therapy to get optimal results and clinical improvement.  -She was advised to call [facility] regarding supplies if needed.  -She was advised to practice good sleep hygiene practices. Connor Cook was advised to make earlier appointment with my clinic if she develops any worsening of sleep symptoms. She verbalizes understanding.  -Jaimie Meneses was advised to not to drive any motor vehicles or operate heavy equipment until her sleep symptoms are under good control. Courtney Torres verbalizes understanding.  -She was advised to loose weight by controlling diet and doing exercise.  -She was advised to call my office for earlier appointment if needed for worsening of sleep symptoms.  -Patient to follow with my clinic at [facility] sleep clinic in 6 to 8 weeks with CPAP download for further evaluation.  -Paco Flannery was educated about my impression and plan. She verbalizes understanding.

## 2021-10-15 NOTE — PROGRESS NOTES
Chief Complaint:Courtney is here for psg results    Mallampati airway Class:3  Neck Circumference:.17 Inches    [place] sleepiness score 10/15/21: .16  Sleep apnea quality  of life questionnaire:70

## 2021-10-22 ENCOUNTER — OFFICE VISIT (OUTPATIENT)
Dept: NEUROLOGY | Age: 51
End: 2021-10-22
Payer: COMMERCIAL

## 2021-10-22 VITALS
DIASTOLIC BLOOD PRESSURE: 78 MMHG | SYSTOLIC BLOOD PRESSURE: 126 MMHG | OXYGEN SATURATION: 98 % | BODY MASS INDEX: 41.66 KG/M2 | WEIGHT: 244 LBS | HEIGHT: 64 IN | HEART RATE: 65 BPM

## 2021-10-22 DIAGNOSIS — G35 MULTIPLE SCLEROSIS (HCC): Primary | ICD-10-CM

## 2021-10-22 DIAGNOSIS — R39.15 URINARY URGENCY: ICD-10-CM

## 2021-10-22 PROCEDURE — 99214 OFFICE O/P EST MOD 30 MIN: CPT | Performed by: NURSE PRACTITIONER

## 2021-10-22 RX ORDER — METFORMIN HYDROCHLORIDE 500 MG/1
500 TABLET, EXTENDED RELEASE ORAL DAILY
COMMUNITY
Start: 2021-09-15

## 2021-10-22 RX ORDER — GLATIRAMER 40 MG/ML
INJECTION, SOLUTION SUBCUTANEOUS
Qty: 36 EACH | Refills: 1 | Status: SHIPPED | OUTPATIENT
Start: 2021-10-22 | End: 2022-04-25 | Stop reason: SDUPTHER

## 2021-10-22 RX ORDER — OMEPRAZOLE 40 MG/1
40 CAPSULE, DELAYED RELEASE ORAL DAILY
COMMUNITY
Start: 2021-09-21

## 2021-10-22 RX ORDER — LISINOPRIL 40 MG/1
40 TABLET ORAL DAILY
COMMUNITY
Start: 2021-09-09

## 2021-10-22 NOTE — PROGRESS NOTES
NEUROLOGY OUT PATIENT FOLLOW UP NOTE:  10/22/34785:48 AM    Anthony Singh is here for follow up for multiple sclerosis. ROS:  Respiratory : no cough, no shortness of breath  Cardiac: no chest pain. No palpitations. Renal : no flank pain, no hematuria    Skin: no rash      No Known Allergies    Current Outpatient Medications:     metFORMIN (GLUCOPHAGE-XR) 500 MG extended release tablet, Take 500 mg by mouth daily, Disp: , Rfl:     omeprazole (PRILOSEC) 40 MG delayed release capsule, Take 40 mg by mouth daily, Disp: , Rfl:     lisinopril (PRINIVIL;ZESTRIL) 40 MG tablet, Take 40 mg by mouth daily, Disp: , Rfl:     nystatin-triamcinolone (MYCOLOG II) 250146-2.1 UNIT/GM-% cream, Apply topically 4 times daily Apply topically 4 times daily. , Disp: , Rfl:     clobetasol (TEMOVATE) 0.05 % cream, Apply topically 2 times daily Apply topically 2 times daily. , Disp: , Rfl:     Azelaic Acid (FINACEA) 15 % GEL, Apply topically See Admin Instructions, Disp: , Rfl:     raNITIdine HCl (ZANTAC 75 PO), Take by mouth , Disp: , Rfl:     atorvastatin (LIPITOR) 10 MG tablet, Take 10 mg by mouth daily, Disp: , Rfl:     glatiramer (COPAXONE) 40 MG/ML injection, INJECT 40 MG UNDER THE SKIN THREE TIMES A WEEK, Disp: 36 Syringe, Rfl: 3    Cholecalciferol (VITAMIN D3) 5000 units TABS, Take by mouth nightly, Disp: , Rfl:     Omega-3 Fatty Acids (OMEGA-3 CF PO), Take 4 tablets by mouth nightly, Disp: , Rfl:     temazepam (RESTORIL) 15 MG capsule, Take 15 mg by mouth nightly as needed for Sleep., Disp: , Rfl:     NONFORMULARY, as needed Indications: C-Testosterone 2%>Petrolatum, Disp: , Rfl:     metFORMIN (GLUCOPHAGE) 500 MG tablet, Take 2,000 mg by mouth daily (with breakfast) , Disp: , Rfl:     lisinopril (PRINIVIL;ZESTRIL) 20 MG tablet, Take 10 mg by mouth daily , Disp: , Rfl:     amLODIPine (NORVASC) 5 MG tablet, Take 5 mg by mouth nightly 1/2 tab am, Disp: , Rfl:     EVENING PRIMROSE OIL PO, Take  by mouth daily.   , STRAW-YELLOW    Character, Urine CLEAR CLEAR-SL CLOUD            Results for orders placed during the hospital encounter of 07/07/20    MRI BRAIN W WO CONTRAST    Narrative  PROCEDURE: MRI BRAIN W WO CONTRAST    CLINICAL INFORMATION: Multiple sclerosis ([address] difficulty speaking. Memory issues. .    COMPARISON: 8/6/2011. TECHNIQUE: Sagittal FSPGR bravo pre and postcontrast with axial and coronal reconstructions, sagittal and axial FLAIR, axial T2, GRE and DWI images of the brain with post processed ADC map. 20 mL ProHance was injected in the right AC. FINDINGS: The ventricles, cisterns and sulci are symmetric and normal in size and configuration. There are mild scattered focal areas of T2/FLAIR prolongation in the periventricular, subcortical deep white matter with callosal septal involvement in  keeping with history of multiple sclerosis. No new/interval lesion is identified. No intra or extra-axial mass is identified. Following contrast administration, no focal areas of abnormal parenchymal or meningeal enhancement are identified. The major vascular flow voids appear patent. Orbits are unremarkable. Paranasal sinuses and mastoid air cells are clear. Impression  Stable plaque burden without new/interval lesion or enhancing lesion to suggest active demyelination. **This report has been created using voice recognition software. It may contain minor errors which are inherent in voice recognition technology. **    Final report electronically signed by Dr. Eloina Alan MD on 7/7/2020 5:08 PM    Results for orders placed in visit on 02/13/15    MRI Cervical Spine W WO Contrast    Narrative  PROCEDURE: MRI CERVICAL SPINE W WO CONTRAST    CLINICAL INFORMATION: Right arm weakness, Right arm pain, Multiple sclerosis, relapsing-remitting ([address] . COMPARISON: January 11, 2011. TECHNIQUE: Sagittal T1, T2 and STIR sequences were obtained through the cervical spine.  Axial fast and echo and gradient echo T2-weighted images were obtained. Postcontrast axial and sagittal T1-weighted images were obtained. FINDINGS:  The cervical vertebral bodies are normally aligned. There is normal marrow signal throughout. No suspicious osseous lesions are present. The facet joints are normally aligned. There is faint signal hyperintensity in the dorsal aspect of the spinal cord at the C4 level. This is at the site of prior enhancement. The current appearance is most consistent with a site of chronic demyelination. No new signal abnormality is present. There is no abnormal enhancement in the cervical spinal cord. The visualized aspects of the posterior fossa are normal.    On the axial images, at C2-3,there are no degenerative changes. At C3-4, there is a tiny posterior disc osteophyte complex. There is no spinal canal or foraminal stenosis. At C4-5, there is a tiny posterior disc osteophyte complex. There is no spinal canal or foraminal stenosis. At C5-6, there are no degenerative changes. At C6-7, there are no degenerative changes. At C7-T1, there are no degenerative changes. There are no suspicious findings in the cervical soft tissues. On the postcontrast images, there is no abnormal enhancement. Impression  1. Faint spinal cord signal hyperintensity posteriorly at the C4 level. This is consistent with a prior site of demyelination. 2. No new abnormalities are present. Final report electronically signed by Dr. Weston Blanc on 2/20/2015 12:42 PM    No results found for this or any previous visit. Assessment:     Diagnosis Orders   1. Multiple sclerosis ([address]  MRI BRAIN W WO CONTRAST    glatiramer (COPAXONE) 40 MG/ML injection    MRI CERVICAL SPINE W WO CONTRAST   2. Urinary urgency  MRI CERVICAL SPINE W WO CONTRAST        She is doing better with her muscle spasms. She is off of the baclofen. She denies any new numbness or weakness.  She reports new symptom in the past 4 weeks where she has noticed urinary urgency. She went to her PCP as she thought she had UTI, however her urine did not show any infection. Her symptoms have improved some, but she can still have urgency at times, no incontinence. She is on Copaxone and is tolerating the medication well. She was seen by sleep medicine and was told she did have sleep apnea and is scheduled for trial with CPAP in November. We will arrange for her to undergo MRI brain as well as MRI cervical spine W/WO contrast to evaluate for disease progression. After detailed discussion with patient we agreed on the following plan. Plan:  1. MRI brain W/WO contrast   2. MRI cervical spine W/WO contrast  3. Continue Copaxone 40 mg SC three times a week. Refills given.   4. Follow through with  sleep medicine recommendations  5. Continue with primrose oil, vitamin D and calcium. 6. Consider referral to urology as a next step for urinary urgency, based on MRI brain and c-spine results  7. Report any new symptoms or concerns.   8. Follow up in 6 months or sooner if needed  9.  Call if any questions or concerns or new symptoms.        Total time 30 min    Felisa Pandey, MATT - CNP

## 2021-11-01 ENCOUNTER — HOSPITAL ENCOUNTER (OUTPATIENT)
Dept: MRI IMAGING | Age: 51
Discharge: HOME | End: 2021-11-01
Payer: COMMERCIAL

## 2021-11-01 ENCOUNTER — TELEPHONE (OUTPATIENT)
Dept: NEUROLOGY | Age: 51
End: 2021-11-01

## 2021-11-01 DIAGNOSIS — G35 MULTIPLE SCLEROSIS (HCC): ICD-10-CM

## 2021-11-01 DIAGNOSIS — R39.15 URINARY URGENCY: ICD-10-CM

## 2021-11-01 PROCEDURE — A9579 GAD-BASE MR CONTRAST NOS,1ML: HCPCS | Performed by: NURSE PRACTITIONER

## 2021-11-01 PROCEDURE — 72156 MRI NECK SPINE W/O & W/DYE: CPT

## 2021-11-01 PROCEDURE — 6360000004 HC RX CONTRAST MEDICATION: Performed by: NURSE PRACTITIONER

## 2021-11-01 PROCEDURE — 70553 MRI BRAIN STEM W/O & W/DYE: CPT

## 2021-11-01 RX ADMIN — GADOTERIDOL 20 ML: 279.3 INJECTION, SOLUTION INTRAVENOUS at 13:37

## 2021-11-01 NOTE — TELEPHONE ENCOUNTER
----- Message from Senaida Boeck, APRN - CNP sent at 11/1/2021  3:35 PM EDT -----  Please let patient know her MRI brain showed no new white matter lesions are present. There is no significant change in the appearance of the brain compared to the prior study.   Juan Manuel Delacruz, CNP

## 2021-11-02 ENCOUNTER — TELEPHONE (OUTPATIENT)
Dept: NEUROLOGY | Age: 51
End: 2021-11-02

## 2021-11-02 NOTE — TELEPHONE ENCOUNTER
----- Message from MATT Crowe CNP sent at 11/1/2021  3:52 PM EDT -----  Please let patient know her MRI cervical spine showed Normal cervical spinal cord. The previously visualized abnormal signal at the C4 level is no longer present.   Daisha Morales CNP

## 2021-11-09 ENCOUNTER — HOSPITAL ENCOUNTER (OUTPATIENT)
Dept: SLEEP CENTER | Age: 51
Discharge: HOME | End: 2021-11-11
Payer: COMMERCIAL

## 2021-11-09 DIAGNOSIS — I10 ESSENTIAL HYPERTENSION: ICD-10-CM

## 2021-11-09 DIAGNOSIS — G47.10 HYPERSOMNIA: ICD-10-CM

## 2021-11-09 DIAGNOSIS — G35 MULTIPLE SCLEROSIS (HCC): ICD-10-CM

## 2021-11-09 DIAGNOSIS — G47.33 OSA (OBSTRUCTIVE SLEEP APNEA): ICD-10-CM

## 2021-11-09 PROCEDURE — 95811 POLYSOM 6/>YRS CPAP 4/> PARM: CPT

## 2021-11-10 DIAGNOSIS — Z99.89 OSA ON CPAP: ICD-10-CM

## 2021-11-10 DIAGNOSIS — G47.33 OSA (OBSTRUCTIVE SLEEP APNEA): ICD-10-CM

## 2021-11-10 DIAGNOSIS — I10 ESSENTIAL HYPERTENSION: ICD-10-CM

## 2021-11-10 DIAGNOSIS — G47.33 OSA ON CPAP: ICD-10-CM

## 2021-11-10 DIAGNOSIS — G35 MULTIPLE SCLEROSIS (HCC): Primary | ICD-10-CM

## 2021-11-10 LAB — STATUS: NORMAL

## 2021-11-10 NOTE — PROGRESS NOTES
Title:  CPAP/BiLevel Titration's    Approved by:  Nelsy Marr MD      Approval Date:  December, 2019 Next Review:  December, 2021     Responsible Party:  Kristi Min Institution/Entities Applies to:   Tapan Ramirez Number:  None    Document Type:  Such as Guideline, Policy, Policy & Procedure, or Procedure, Instructions  Manual:  Policy and Procedures    Section: IV Policy Start Date: October, 4773           POLICY: Positive airway pressure device is used to treat patients with sleep related breathing disorders characterized by full or partial occlusion of the upper airway during sleep. A patient must have undergone polysomnography and diagnosed with obstructive sleep apnea. All individuals who record sleep studies must follow best practices for CPAP/bilevel/ASV titrations in order to attain the ideal pressure setting for their patients. Too low of pressure may cause patients to either be sub-optimally treated or to wake up in a panic. Too much pressure may cause the patient to experience bloating or mask leakage. Determining the appropriate pressure setting for each patient will lead to improved adherence and outcome. Titrations are not an exact science, and it is understood that technologists may need to make minor changes for individual patients. The procedures outlined below are meant to be a guideline and follow the spirit of the AASM clinical guidelines. PROCEDURE:    CPAP:    1. Review the patients pertinent medical al history, previous sleep study or studies to ass the severity of sleep disordered breathing. Review of pertinent information will help to attain a better titration. 2. Applications of electrodes, montages, filters, sensitivities and scoring will be performed according to the current version of the AASM Scoring Manual.   3. Prior to initiating study collect all appropriate PAP supplies  a. Tubing   b.  Humidifier (filled with to exhale against higher expiratory pressures (typically beginning anywhere from 15 to 20 cm of H20.  c. Has frequent central apneas, the use of bilevel positive airway pressure may be indicated. Document directly on study why the patient is being switched from CPAP to bilevel. 12. Ensure that supine sleep has been seen on the chosen setting. Going above the chosen setting 1 or 2 cm H20 to show range may be helpful to ensure that the correct pressure has been established. BiLEVEL:    1. Technologist may change from CPAP to bilevel during a study if proven the patient is unable to tolerate CPAP. 2. Review the patients pertinent medical al history, previous sleep study or studies to ass the severity of sleep disordered breathing. Review of pertinent information will help to attain a better titration. 3. Applications of electrodes, montages, filters, sensitivities and scoring will be performed according to the current version of the AASM Scoring Manual.   4. Prior to initiating study collect all appropriate PAP supplies  a. Tubing   b. Humidifier (filled with distilled water)  c. Masks   5. The technologist should assess and measure patient for most appropriate mask prior to start of study. 6. If the patient has not previously been on CPAP, beginning pressures should be 8/4 cm H20 or higher if patient is morbidly obese or unable to fall asleep at lower pressures. If the patient has been previously successfully treated on CPAP start expiratory pressure at therapeutic setting and set inspiratory pressure 4 cm H20 higher. If advancing from CPAP to bilevel during a study expiratory pressure should be set at most successful CPAP setting and inspiratory pressure set 4 cm h20 higher. 7. The standard differential pressure utilized during bilevel titrations typically ranges from 3 to 5 cm H20, with 4 cm H20 being the most common.   Higher differential pressures may be needed in patients who are morbidly obese or who have neuromuscular diseases. 8. If apneas or frequent hypopneas are present, inspiratory and expiratory pressure settings should be increased by 2 cm H20. If occasional hypopneas, snoring, or mask flow limitation are present inspiratory and expiratory pressures settings should be increased by 1 cm h20 and maintained for at least 5 minutes to determine if events improve or resolve. Pressure settings may need to be increased more quickly during REM sleep given the limited amount of REM during sleep and the need to treat events during this stage. 9. If a mask leak occurs, the tech should first fix the leakage before raising the pressure. Otherwise, the final pressure setting chosen for the patient may be too high. Once the mask leak has been fixed, decrease the pressure to the last setting where mouth breathing and/or mask leakage was not present, and then re-titrate as indicated. Make sure to document directly on the study the steps taken to resolve the leak and the type of masks used. Pressure setting usually do not need to be set as high with a nasal-mask than with a full-face mask. 10. The recording technologist should document directly on the study at least every 30 minutes. 11. If the patient takes a break from wearing the mask, do not decrease the CPAP pressure on attempted return to sleep unless the patient remains awake for 15 minutes or the patient specifically requests that the pressure be lowered. 12. Do not raise pressure for central apneas. If the patient develops central apneas, pressure setting may need to be lowered. If the patient has central apneas on bilevel, the use of spontaneous (ST) mode may be indicated. a. ST mode may only be used in 2 cases  i. An order for ST mode with a primary diagnosis of central sleep apnea  ii. During a titration if obstructive events are less than 5/ hour and centrals must be greater than 50% of total respiratory events.    13. Ensure that supine sleep has

## 2021-11-11 NOTE — PROGRESS NOTES
[address] [place] [address]                               SLEEP STUDY REPORT    PATIENT NAME: Harvey Parker                  :        1970  MED REC NO:   [id number]                           ROOM:  ACCOUNT NO:   [de-identified]                           ADMIT DATE: 2021  PROVIDER:     Brooklyn Hanks. MD [place]    DATE OF STUDY:  2021    CPAP TITRATION STUDY REPORT    REFERRING PROVIDER:  Dandy Burciaga MD    The patient's height is 64 inches, weight is 243 pounds with a BMI of  41.7. HISTORY:  The patient is a 68-year-old female who underwent initial  baseline sleep study on 2021. The patient was diagnosed with mild  obstructive sleep apnea with worsening of respiratory events in REM  sleep. The patient had associated comorbidities including multiple  sclerosis, depression, and essential hypertension. The patient was  scheduled for CPAP titration as a treatment. METHODS:  The patient underwent digital polysomnography in compliance  with the standards and specifications from the AASM Manual including the  simultaneous recording of 3 EEG channels (F4-M1, C4-M1, and O2-M1 with  back up electrodes F3-M2, C3-M2, and O1-M2), 2 EOG channels (E1-M2, and  E2-M1,), EMG (chin, left & right leg), EKG, Nonin pulse oximetry with   less than 2 second averaging time, body position, airflow recorded by  oral-nasal thermal sensor and nasal air pressure transducer, plus  respiratory effort recorded by calibrated respiratory inductance  plethysmography (RIP), flow volume loop, sound and video. Sleep staging  and scoring followed the standard put forth by the American Academy of  Sleep Medicine and utilized the 1B obstructive hypopnea event  desaturation of 4 percent or greater. INTERPRETATION:  This is a CPAP titration study, and the study was  performed on 2021.   The study was started at 09:50 p.m. and was  terminated at 05:14 a.m. with total recording time of 443.4 minutes,  sleep period time was 438.6 minutes, total sleep time was 389.6 minutes,  and overall sleep efficiency was 87.9%. The sleep onset latency was 4.8  minutes and wake after sleep onset was 49 minutes, REM sleep latency was  129.5 minutes. SLEEP STAGING AND DISTRIBUTION SUMMARY:  Revealed the patient spent 21.1  minutes in stage I consisting of 5.4%, 184 minutes in stage II  consisting of 47.2%, 114.5 minutes in stage III consisting of 29.4%, 70  minutes in REM sleep consisting of 18% of total sleep time. CPAP TITRATION STUDY:  The CPAP titration study was started with a CPAP  pressure of 5 cm of water, and the CPAP pressure was gradually increased  to a CPAP pressure of 11 cm of water by titrating to apneas and  hypopneas. At a CPAP pressure of 11 cm of water, the patient spent 3  hours 21 minutes in bed. Out of 3 hours 21 minutes, the patient slept  for a period of 39.3 minutes in REM sleep and 2 hours 14 minutes in  non-REM sleep. At a CPAP pressure of 11 cm of water, the patient had 1  obstructive apnea event and 7 central apneas with an apnea-hypopnea  index of 2.8. The maximum oxygen desaturation recorded at this pressure  was 94% with a mean oxygen saturation of 96.5%. This titration study  was performed on room air. PERIODIC LIMB MOVEMENT ANALYSIS:  Revealed the patient had a total of 0  periodic limb movements. The patient had a total of 20 spontaneous  arousals with a spontaneous arousal index of 3.1. EKG MONITORING:  Revealed normal sinus rhythm. IMPRESSION:  1. Mild obstructive sleep apnea with worsening of respiratory events  during REM sleep. The patient had optimal titration to a CPAP pressure  of 11 cm of water with room air. 2.  Decreased and delayed REM sleep. 3.  Hypersomnia, most likely due to sleep apnea.   4.  Multiple sclerosis, currently following with neurologist, Melinda Cole MD.  5. Depression, on treatment with medications. 6.  Essential hypertension. RECOMMENDATIONS:  1. For the patient's sleep-disordered breathing, we recommend starting  therapy with a CPAP pressure of 11 cm of water with room air. 2.  Specific recommendations include the patient's choice of interface  was F20 medium-size mask. 3.  The patient should be scheduled for followup with my clinic in six  to eight weeks. I recommended CPAP therapy for clinical reevaluation  with review of download. Thanks to Cammy Arcos MD, for giving me this opportunity to  participate in the care of this pleasant lady.         Matilde Belcher MD    D: 11/10/2021 17:47:00       T: 11/11/2021 7:01:34     [email or web address]  Job#: [id number]     Doc#: [id number]    CC:

## 2021-11-12 ENCOUNTER — TELEPHONE (OUTPATIENT)
Dept: SLEEP CENTER | Age: 51
End: 2021-11-12

## 2022-02-05 NOTE — PROGRESS NOTES
[facility] [address] Follow up note    Khris Valdes         Chief Complaint: Ekaterina Osman is a 2 month f/u with download                                         Chief complaint and [place]: Khris Valdes is a 46 y. o.oldfemale came for follow up regarding her sleep apnea. She is currently using her positive airway pressure device with a CPAP pressure of 11cm H20. She denies any problems with machine or mask. She is currently using a full facemask. she is waking up with a dry mouth. She is sleeping well at night with out difficulty. She denies any daytime sleepiness. Review of Systems:   General/Constitutional: she remained at same weight from the last visit with normal appetite. No fever or chills. HENT: Negative. Eyes: Negative. Upper respiratory tract: No nasal stuffiness or post nasal drip. Lower respiratory tract/ lungs: No cough or sputum production. No hemoptysis. Cardiovascular: No palpitations or chest pain. Gastrointestinal: No nausea or vomiting. Neurological: No focal neurologiacal weakness. Extremities: No edema. Musculoskeletal: No complaints. Genitourinary: No complaints. Hematological: Negative. Psychiatric/Behavioral: Negative. Skin: No itching. Past Medical History:   Diagnosis Date    Depression     Diabetes mellitus ([facility] [address]     patient states prediabetes    Hypertension     Hypothyroidism     Multiple sclerosis ([facility] [address]     Sleep apnea        Past Surgical History:   Procedure Laterality Date    BREAST SURGERY  2012    biopsy     SECTION      COLONOSCOPY      last one     DILATION AND CURETTAGE OF UTERUS  7/15/2014    ENDOMETRIAL ABLATION  7/15/2014    EYE SURGERY  ? ?     lasik, bilateral    FACIAL SURGERY Right 2020    EXCISION CYST RIGHT LOWER LIP performed by Ladi Quiros MD at [address] [facility] Right 2013    DEBRIDEMENT RT. ACHILLES TENDON WITH TOPAZ RT. ANKLE    HYSTERECTOMY      HYSTERECTOMY ABDOMINAL N/A 5/14/2019    ROBOTIC HYSTERECTOMY WITH LEFT SALPINGO-OOPHORECTOMY performed by Princess Disla MD at [address] [place] [address] LOC BREAST BIOPSY LEFT Left 2011    benign    [facility] HYSTEROSCOPY,W/ENDO BX N/A 6/5/2018    DILATATION AND CURETTAGE HYSTEROSCOPY performed by Princess Disla MD at [address] [place] [address] SALPINGO-OOPHORECTOMY Right 4/28/15    SALPINGO-OOPHORECTOMY Left     took left ovary during hysterectomy a couple years later    TONSILLECTOMY      at age 10 years    TUBAL LIGATION      UPPER GASTROINTESTINAL ENDOSCOPY      UPPER GASTROINTESTINAL ENDOSCOPY  2021    WISDOM TOOTH EXTRACTION         Social History     Tobacco Use    Smoking status: Never Smoker    Smokeless tobacco: Never Used   Vaping Use    Vaping Use: Never used   Substance Use Topics    Alcohol use: Yes     Alcohol/week: 2.0 standard drinks     Types: 2 Glasses of wine per week     Comment: weekly    Drug use: No       No Known Allergies    Current Outpatient Medications   Medication Sig Dispense Refill    metFORMIN (GLUCOPHAGE-XR) 500 MG extended release tablet Take 500 mg by mouth daily      omeprazole (PRILOSEC) 40 MG delayed release capsule Take 40 mg by mouth daily      lisinopril (PRINIVIL;ZESTRIL) 40 MG tablet Take 40 mg by mouth daily      glatiramer (COPAXONE) 40 MG/ML injection INJECT 40 MG UNDER THE SKIN THREE TIMES A WEEK 36 each 1    nystatin-triamcinolone (MYCOLOG II) 756172-6.1 UNIT/GM-% cream Apply topically 4 times daily Apply topically 4 times daily.  clobetasol (TEMOVATE) 0.05 % cream Apply topically 2 times daily Apply topically 2 times daily.       Azelaic Acid (FINACEA) 15 % GEL Apply topically See Admin Instructions      ondansetron (ZOFRAN ODT) 4 MG disintegrating tablet Take 1 tablet by mouth every 8 hours as needed for Nausea or Vomiting 15 tablet 0    raNITIdine HCl (ZANTAC 75 PO) Take by mouth       atorvastatin (LIPITOR) 10 MG tablet Take 10 mg by mouth daily      Cholecalciferol (VITAMIN D3) 5000 units TABS Take by mouth nightly      Omega-3 Fatty Acids (OMEGA-3 CF PO) Take 4 tablets by mouth nightly      NONFORMULARY as needed Indications: C-Testosterone 2%>Petrolatum      metFORMIN (GLUCOPHAGE) 500 MG tablet Take 2,000 mg by mouth daily (with breakfast)       lisinopril (PRINIVIL;ZESTRIL) 20 MG tablet Take 10 mg by mouth daily       amLODIPine (NORVASC) 5 MG tablet Take 5 mg by mouth nightly 1/2 tab am      EVENING PRIMROSE OIL PO Take  by mouth daily.  Calcium Carbonate-Vitamin D (CALCIUM + D) 600-200 MG-UNIT TABS Take 3 tablets by mouth daily.  citalopram (CELEXA) 20 MG tablet Take 20 mg by mouth daily.  levothyroxine (SYNTHROID) 75 MCG tablet Take 75 mcg by mouth daily.  temazepam (RESTORIL) 15 MG capsule Take 15 mg by mouth nightly as needed for Sleep. (Patient not taking: Reported on 3/2/2022)       No current facility-administered medications for this visit. Family History   Problem Relation Age of Onset    Heart Disease Mother     High Blood Pressure Mother     High Cholesterol Mother    [de-identified] / Stillbirths Mother     Stroke Mother     Vision Loss Mother     Cancer Mother         skin    Arthritis Mother     Uterine Cancer Mother 80    Depression Father     Diabetes Father     Hearing Loss Father     Heart Disease Father     High Blood Pressure Father     Stroke Father     Substance Abuse Father         alcohol    Cancer Father         skin    Heart Disease Maternal Grandmother           /78 (Site: Left Lower Arm, Position: Sitting, Cuff Size: Medium Adult)   Pulse 65   Temp 97.8 °F (36.6 °C)   Ht 5' 3.5\" (1.613 m)   Wt 243 lb 6.4 oz (110.4 kg)   LMP 04/24/2015   SpO2 97% Comment: room air at rest  BMI 42.44 kg/m²   BMI:  Body mass index is 42.44 kg/m².      Mallampati airway Class:3  Neck Circumference:.17 Inches  Chris sleepiness score 3/2/22: 7  Sleep apnea quality of life questionnaire:.93    Physical Exam :  Constitutional: Patient appears moderately built and moderately nourished. No distress. Patient is oriented to person, place, and time. HENT:   Head: Normocephalic and atraumatic. Right Ear: External ear normal.   Left Ear: External ear normal.   Mouth/Throat: Oropharynx is clear and moist.   Eyes: Conjunctivae are normal. Pupils are equal and reactive to light. No scleral icterus. Neck: Neck supple. No JVD present. Cardiovascular: Normal rate, regular rhythm, normal heart sounds. No murmur heard. Pulmonary/Chest: Effort normal and breath sounds normal. No stridor. No respiratory distress. No wheezes. No rales. Abdominal: Soft. Patient exhibits no distension. No tenderness. Musculoskeletal: Normal range of motion. Extremities:Patient exhibits no edema and no tenderness. Lymphadenopathy:  No cervical adenopathy. Neurological: Patient is alert and oriented to person, place, and time. Skin: Skin is warm and dry. Patient is not diaphoretic. Psychiatric: Patient  has a normal mood and affect. Diagnostic Data:            SLEEP STUDY REPORT     PATIENT NAME: Phani Lane                  :        1970  MED REC NO:   [id number]                           ROOM:  ACCOUNT NO:   [de-identified]                           ADMIT DATE: 2021  PROVIDER:     Prerna Colvin. MD [facility]     DATE OF STUDY:  2021     CPAP TITRATION STUDY REPORT     REFERRING PROVIDER:  Cody Simmons MD  IMPRESSION:  1. Mild obstructive sleep apnea with worsening of respiratory events  during REM sleep. The patient had optimal titration to a CPAP pressure  of 11 cm of water with room air. 2.  Decreased and delayed REM sleep. 3.  Hypersomnia, most likely due to sleep apnea. 4.  Multiple sclerosis, currently following with neurologist, Cody Simmons MD.  5.  Depression, on treatment with medications.   6. Essential hypertension. Diagnostic Data: Psg  8/6/21 [facility]  PAP Download:   Recorded compliance dates:1/29/22-2/27/22  More than 4hour usage compliance was:87%. Average residual Apnea- Hypoapnea index on current pressue was:2.9.       PAP Type cpap   Level  11      Average usuage hours per day was:.[id number]                 Interface: full     Provider:  [x]? [facility]             []?[facility]                        []?[facility]          []? [facility]                           []?[facility]      []? Other:       Assesment:  -Mild obstructive sleep apnea on treatment with a CPAP pressure of 11cm H20 - she is using her CPAP device with excellent compliance to >4 hours of therapy. Her respiratory events were under good control with current therapy. Her clinical symptoms improved. She is benefiting from current CPAP therapy and would like to continue the therapy. However, she is waking up in the morning with a dry mouth.  -Hypersomnia ( Excessive daytime sleepiness) due to obstructive sleep apnea- Improved. -Multiple sclerosis. Buck Ontiveros is currently following with Dr. Jovana Hilton MD. Buck Ontiveros is currently on treatment with Copaxone 40 mg subcutaneously 3 times a week. -Depression on treatment with medications.  -Essential hypertension on treatment medications.  -Hypothyroidism on supplementation.  -Type 2 diabetes mellitus.  She is currently on treatment with Metformin      Recommendations/Plan:   -She was educated and advised to apply her current mask properly and tight enough to minimize leaks.  -Patient will be given a prescription for chin strap to use with her current mask to avoid leaks and to improve her dryness of mouth. -.She was advised to adjust her heated humidification for her comfort to improve compliance.   -She was advised to continue current positive airway pressure therapy with above described pressure.  -She was advised to keep good compliance with current recommended pressure to get optimal results and clinical improvement.  -Follow with my clinic in 12months for clinical reevaluation with review of download. -She was advised to call [employer] regarding supplies if needed.  -She was advised to continue to practice good sleep hygiene practices.  -She was advised to loose weight by controlling diet and doing exercise.  -She was advised to call my office for earlier appointment if needed for worsening of sleep symptoms.  -Springvillegiuseppe Fernández was educated about my impression and plan. Patient verbalizes understanding.      -I personally reviewed updated the Past medical hx, Past surgical hx,Social hx, Family hx, Medications, Allergies in the discrete data section of the patient chart along with labs, Pulmonary medicine,Sleep medicine related, Pathological, Microbiological and Radiological investigations.

## 2022-03-02 ENCOUNTER — OFFICE VISIT (OUTPATIENT)
Dept: PULMONOLOGY | Age: 52
End: 2022-03-02
Payer: COMMERCIAL

## 2022-03-02 VITALS
OXYGEN SATURATION: 97 % | TEMPERATURE: 97.8 F | HEART RATE: 65 BPM | WEIGHT: 243.4 LBS | BODY MASS INDEX: 41.55 KG/M2 | DIASTOLIC BLOOD PRESSURE: 78 MMHG | HEIGHT: 64 IN | SYSTOLIC BLOOD PRESSURE: 122 MMHG

## 2022-03-02 DIAGNOSIS — Z99.89 OSA ON CPAP: Primary | ICD-10-CM

## 2022-03-02 DIAGNOSIS — G47.33 OSA ON CPAP: Primary | ICD-10-CM

## 2022-03-02 DIAGNOSIS — I10 ESSENTIAL HYPERTENSION: ICD-10-CM

## 2022-03-02 DIAGNOSIS — G35 MULTIPLE SCLEROSIS (HCC): ICD-10-CM

## 2022-03-02 PROCEDURE — 99214 OFFICE O/P EST MOD 30 MIN: CPT | Performed by: INTERNAL MEDICINE

## 2022-03-02 NOTE — PROGRESS NOTES
Chief Complaint: Debra West is a 2 month f/u with download     Mallampati airway Class:3  Neck Circumference:.17 Inches    Mobile sleepiness score 3/2/22: 7  Sleep apnea quality of life questionnaire:.93      Diagnostic Data: Psg  8/6/21 [facility]  PAP Download:   Recorded compliance dates:1/29/22-2/27/22  More than 4hour usage compliance was:87%. Average residual Apnea- Hypoapnea index on current pressue was:2.9.       PAP Type cpap   Level  11     Average usuage hours per day was:.[id number]     Interface: full    Provider:  [x][facility]  [][facility]  [][facility]  [][facility]         [][facility] []Other:

## 2022-04-25 ENCOUNTER — OFFICE VISIT (OUTPATIENT)
Dept: NEUROLOGY | Age: 52
End: 2022-04-25
Payer: COMMERCIAL

## 2022-04-25 VITALS
HEIGHT: 64 IN | WEIGHT: 245.6 LBS | SYSTOLIC BLOOD PRESSURE: 132 MMHG | DIASTOLIC BLOOD PRESSURE: 90 MMHG | HEART RATE: 74 BPM | OXYGEN SATURATION: 98 % | BODY MASS INDEX: 41.93 KG/M2 | RESPIRATION RATE: 17 BRPM

## 2022-04-25 DIAGNOSIS — G35 MULTIPLE SCLEROSIS (HCC): Primary | ICD-10-CM

## 2022-04-25 PROCEDURE — 99213 OFFICE O/P EST LOW 20 MIN: CPT | Performed by: PSYCHIATRY & NEUROLOGY

## 2022-04-25 RX ORDER — GLATIRAMER 40 MG/ML
INJECTION, SOLUTION SUBCUTANEOUS
Qty: 36 EACH | Refills: 1 | Status: SHIPPED | OUTPATIENT
Start: 2022-04-25 | End: 2022-11-01 | Stop reason: SDUPTHER

## 2022-04-25 NOTE — PROGRESS NOTES
NEUROLOGY OUT PATIENT FOLLOW UP NOTE:  4/25/20229:37 AM    Anjana Castillo is here for follow up for multiple sclerosis. No Known Allergies    Current Outpatient Medications:     lisinopril (PRINIVIL;ZESTRIL) 40 MG tablet, Take 40 mg by mouth daily, Disp: , Rfl:     glatiramer (COPAXONE) 40 MG/ML injection, INJECT 40 MG UNDER THE SKIN THREE TIMES A WEEK, Disp: 36 each, Rfl: 1    nystatin-triamcinolone (MYCOLOG II) 308316-5.1 UNIT/GM-% cream, Apply topically 4 times daily Apply topically 4 times daily. , Disp: , Rfl:     clobetasol (TEMOVATE) 0.05 % cream, Apply topically 2 times daily Apply topically 2 times daily. , Disp: , Rfl:     atorvastatin (LIPITOR) 10 MG tablet, Take 10 mg by mouth daily, Disp: , Rfl:     Cholecalciferol (VITAMIN D3) 5000 units TABS, Take by mouth nightly, Disp: , Rfl:     Omega-3 Fatty Acids (OMEGA-3 CF PO), Take 4 tablets by mouth nightly, Disp: , Rfl:     temazepam (RESTORIL) 15 MG capsule, Take 15 mg by mouth nightly as needed for Sleep. , Disp: , Rfl:     NONFORMULARY, as needed Indications: C-Testosterone 2%>Petrolatum, Disp: , Rfl:     metFORMIN (GLUCOPHAGE) 500 MG tablet, Take 2,000 mg by mouth daily (with breakfast) , Disp: , Rfl:     amLODIPine (NORVASC) 5 MG tablet, Take 5 mg by mouth nightly 1/2 tab am, Disp: , Rfl:     EVENING PRIMROSE OIL PO, Take  by mouth daily. , Disp: , Rfl:     Calcium Carbonate-Vitamin D (CALCIUM + D) 600-200 MG-UNIT TABS, Take 3 tablets by mouth daily. , Disp: , Rfl:     citalopram (CELEXA) 20 MG tablet, Take 20 mg by mouth daily. , Disp: , Rfl:     levothyroxine (SYNTHROID) 75 MCG tablet, Take 75 mcg by mouth daily. , Disp: , Rfl:     metFORMIN (GLUCOPHAGE-XR) 500 MG extended release tablet, Take 500 mg by mouth daily (Patient not taking: Reported on 4/25/2022), Disp: , Rfl:     omeprazole (PRILOSEC) 40 MG delayed release capsule, Take 40 mg by mouth daily, Disp: , Rfl:     Azelaic Acid (FINACEA) 15 % GEL, Apply topically See Admin Instructions (Patient not taking: Reported on 4/25/2022), Disp: , Rfl:     ondansetron (ZOFRAN ODT) 4 MG disintegrating tablet, Take 1 tablet by mouth every 8 hours as needed for Nausea or Vomiting (Patient not taking: Reported on 4/25/2022), Disp: 15 tablet, Rfl: 0    I reviewed the past medical history, allergies, medications, social history and family history. PE:   Vitals:    04/25/22 0926   BP: (!) 132/90   Pulse: 74   Resp: 17   SpO2: 98%   Weight: 245 lb 9.6 oz (111.4 kg)   Height: 5' 3.5\" (1.613 m)     General Appearance:  awake, alert, oriented, in no acute distress  Gen: NAD, Language is Intact. Skin: no rash, lesion, dry  to touch. warm  Head: no rash, no icterus  Neck: There is no carotid bruits. The Neck is supple. There is no neck lymphadenopathy. Neuro: CN 2-12 grossly intact with no focal deficits. Power 5/5 Throughout symmetric, Reflexes are  symmetric. Long tracts are intact. Cerebellar exam is Intact. Sensory exam is intact to light touch. Gait is intact. Musculoskeletal:  Has no hand arthritis, no limitation of ROM in any of the four extremities. Lower extremities no edema          DATA:    Results for orders placed or performed during the hospital encounter of 11/09/21   Sleep Study with PAP Titration   Result Value Ref Range    Status Interpreted       MRI brain with and without contrast 11/1/2021  Impression       1. Mild amount of abnormal signal in the white matter the brain consistent with the patient's history of multiple sclerosis. No new white matter lesions are present. 2. There is no significant change in the appearance of the brain compared to the prior study.                   **This report has been created using voice recognition software. It may contain minor errors which are inherent in voice recognition technology. **           Final report electronically signed by Dr. Bianca Zuniga on 11/1/2021 3:16 PM     MRI C spine with and without contrast: 11/1/2021  Impression       1. Normal cervical spinal cord. The previously visualized abnormal signal at the C4 level is no longer present. 2. No significant degenerative changes are noted.                   **This report has been created using voice recognition software. It may contain minor errors which are inherent in voice recognition technology. **       Final report electronically signed by Dr. Willam Montejo on 11/1/2021 3:33 PM              Assessment:     Diagnosis Orders   1. Multiple sclerosis ([facility] [address]          Follow up for multiple sclerosis. she is doing well. She is doing much better with her sleep after being placed on the CPAP, her energy level is much better. She is on Copaxone, compliant and is tolerating the medication well. She reports the urinary incontinence is better. She has a non focal exam.   After detailed discussion with patient we agreed on the following plan. Plan:  1. Continue Copaxone 40 mg SC three times a week. Refills given.   2. Follow sleep medicine recommendations  3. Continue with Primrose oil, vitamin D and calcium. 4. Report any new symptoms or concerns.   5. Follow up in 6 months or sooner if needed  6.  Call if any questions or concerns or new symptoms.        Total time 22 min    Jeremías Hernandze MD

## 2022-04-25 NOTE — PATIENT INSTRUCTIONS
1. Continue Copaxone 40 mg SC three times a week. Refills given.   2. Follow sleep medicine recommendations  3. Continue with Primrose oil, vitamin D and calcium. 4. Report any new symptoms or concerns.   5. Follow up in 6 months or sooner if needed  6. Call if any questions or concerns or new symptoms.

## 2022-11-01 DIAGNOSIS — G35 MULTIPLE SCLEROSIS (HCC): Primary | ICD-10-CM

## 2022-11-01 RX ORDER — GLATIRAMER 40 MG/ML
INJECTION, SOLUTION SUBCUTANEOUS
Qty: 36 EACH | Refills: 1 | Status: SHIPPED | OUTPATIENT
Start: 2022-11-01

## 2022-11-17 ENCOUNTER — HOSPITAL ENCOUNTER (OUTPATIENT)
Age: 52
Discharge: HOME | End: 2022-11-17
Payer: COMMERCIAL

## 2022-11-17 LAB
ALBUMIN SERPL-MCNC: 4.4 G/DL (ref 3.5–5.1)
ALP BLD-CCNC: 96 U/L (ref 38–126)
ALT SERPL-CCNC: 39 U/L (ref 11–66)
ANION GAP SERPL CALCULATED.3IONS-SCNC: 15 MEQ/L (ref 8–16)
AST SERPL-CCNC: 42 U/L (ref 5–40)
AVERAGE GLUCOSE: 210 MG/DL (ref 70–126)
BASOPHILS # BLD: 0.9 %
BASOPHILS ABSOLUTE: 0 THOU/MM3 (ref 0–0.1)
BILIRUB SERPL-MCNC: 0.9 MG/DL (ref 0.3–1.2)
BUN BLDV-MCNC: 12 MG/DL (ref 7–22)
CALCIUM SERPL-MCNC: 9.2 MG/DL (ref 8.5–10.5)
CHLORIDE BLD-SCNC: 99 MEQ/L (ref 98–111)
CHOLESTEROL, TOTAL: 159 MG/DL (ref 100–199)
CO2: 25 MEQ/L (ref 23–33)
CREAT SERPL-MCNC: 0.7 MG/DL (ref 0.4–1.2)
CREATININE, URINE: 122.6 MG/DL
EOSINOPHIL # BLD: 3.3 %
EOSINOPHILS ABSOLUTE: 0.2 THOU/MM3 (ref 0–0.4)
ERYTHROCYTE [DISTWIDTH] IN BLOOD BY AUTOMATED COUNT: 13 % (ref 11.5–14.5)
ERYTHROCYTE [DISTWIDTH] IN BLOOD BY AUTOMATED COUNT: 42.3 FL (ref 35–45)
GFR SERPL CREATININE-BSD FRML MDRD: > 60 ML/MIN/1.73M2
GLUCOSE BLD-MCNC: 234 MG/DL (ref 70–108)
HBA1C MFR BLD: 9 % (ref 4.4–6.4)
HCT VFR BLD CALC: 44.4 % (ref 37–47)
HDLC SERPL-MCNC: 68 MG/DL
HEMOGLOBIN: 14.2 GM/DL (ref 12–16)
IMMATURE GRANS (ABS): 0.02 THOU/MM3 (ref 0–0.07)
IMMATURE GRANULOCYTES: 0.4 %
LDL CHOLESTEROL CALCULATED: 64 MG/DL
LYMPHOCYTES # BLD: 16.2 %
LYMPHOCYTES ABSOLUTE: 0.9 THOU/MM3 (ref 1–4.8)
MCH RBC QN AUTO: 28.5 PG (ref 26–33)
MCHC RBC AUTO-ENTMCNC: 32 GM/DL (ref 32.2–35.5)
MCV RBC AUTO: 89.2 FL (ref 81–99)
MICROALBUMIN UR-MCNC: < 1.2 MG/DL
MICROALBUMIN/CREAT UR-RTO: 10 MG/G (ref 0–30)
MONOCYTES # BLD: 7.1 %
MONOCYTES ABSOLUTE: 0.4 THOU/MM3 (ref 0.4–1.3)
NUCLEATED RED BLOOD CELLS: 0 /100 WBC
PLATELET # BLD: 249 THOU/MM3 (ref 130–400)
PMV BLD AUTO: 9.6 FL (ref 9.4–12.4)
POTASSIUM SERPL-SCNC: 4.6 MEQ/L (ref 3.5–5.2)
RBC # BLD: 4.98 MILL/MM3 (ref 4.2–5.4)
SEG NEUTROPHILS: 72.1 %
SEGMENTED NEUTROPHILS ABSOLUTE COUNT: 4 THOU/MM3 (ref 1.8–7.7)
SODIUM BLD-SCNC: 139 MEQ/L (ref 135–145)
T4 FREE: 1.04 NG/DL (ref 0.93–1.76)
TOTAL PROTEIN: 7 G/DL (ref 6.1–8)
TRIGL SERPL-MCNC: 137 MG/DL (ref 0–199)
TSH SERPL DL<=0.05 MIU/L-ACNC: 13.52 UIU/ML (ref 0.4–4.2)
VITAMIN D 25-HYDROXY: 65 NG/ML (ref 30–100)
WBC # BLD: 5.5 THOU/MM3 (ref 4.8–10.8)

## 2022-11-17 PROCEDURE — 82306 VITAMIN D 25 HYDROXY: CPT

## 2022-11-17 PROCEDURE — 84443 ASSAY THYROID STIM HORMONE: CPT

## 2022-11-17 PROCEDURE — 82043 UR ALBUMIN QUANTITATIVE: CPT

## 2022-11-17 PROCEDURE — 80061 LIPID PANEL: CPT

## 2022-11-17 PROCEDURE — 80053 COMPREHEN METABOLIC PANEL: CPT

## 2022-11-17 PROCEDURE — 83036 HEMOGLOBIN GLYCOSYLATED A1C: CPT

## 2022-11-17 PROCEDURE — 36415 COLL VENOUS BLD VENIPUNCTURE: CPT

## 2022-11-17 PROCEDURE — 84439 ASSAY OF FREE THYROXINE: CPT

## 2022-11-17 PROCEDURE — 85025 COMPLETE CBC W/AUTO DIFF WBC: CPT

## 2022-11-18 ENCOUNTER — OFFICE VISIT (OUTPATIENT)
Dept: NEUROLOGY | Age: 52
End: 2022-11-18
Payer: COMMERCIAL

## 2022-11-18 VITALS
HEIGHT: 63 IN | BODY MASS INDEX: 44.12 KG/M2 | HEART RATE: 86 BPM | DIASTOLIC BLOOD PRESSURE: 96 MMHG | WEIGHT: 249 LBS | SYSTOLIC BLOOD PRESSURE: 144 MMHG | OXYGEN SATURATION: 97 %

## 2022-11-18 DIAGNOSIS — G35 MULTIPLE SCLEROSIS (HCC): Primary | ICD-10-CM

## 2022-11-18 PROCEDURE — 99213 OFFICE O/P EST LOW 20 MIN: CPT | Performed by: NURSE PRACTITIONER

## 2022-11-18 NOTE — PATIENT INSTRUCTIONS
Continue Copaxone 40 mg SC three times a week. Refills given. Follow sleep medicine recommendations  You need to continue wearing your CPAP nightly. Continue with Primrose oil, vitamin D and calcium. Report any new symptoms or concerns.    Follow up in 6 months or sooner if needed  Call if any questions or concerns or new symptoms

## 2022-11-18 NOTE — PROGRESS NOTES
NEUROLOGY OUT PATIENT FOLLOW UP NOTE:  11/18/20228:33 AM    Christiano Whitaker is here for follow up for multiple sclerosis. ROS:  Respiratory : no cough, no shortness of breath  Cardiac: no chest pain. No palpitations. Renal : no flank pain, no hematuria    Skin: no rash      No Known Allergies    Current Outpatient Medications:     glatiramer (COPAXONE) 40 MG/ML injection, INJECT 40 MG UNDER THE SKIN THREE TIMES A WEEK, Disp: 36 each, Rfl: 1    lisinopril (PRINIVIL;ZESTRIL) 40 MG tablet, Take 40 mg by mouth daily, Disp: , Rfl:     nystatin-triamcinolone (MYCOLOG II) 849380-8.1 UNIT/GM-% cream, Apply topically 4 times daily Apply topically 4 times daily. , Disp: , Rfl:     clobetasol (TEMOVATE) 0.05 % cream, Apply topically 2 times daily Apply topically 2 times daily. , Disp: , Rfl:     atorvastatin (LIPITOR) 10 MG tablet, Take 10 mg by mouth daily, Disp: , Rfl:     Cholecalciferol (VITAMIN D3) 5000 units TABS, Take by mouth nightly, Disp: , Rfl:     Omega-3 Fatty Acids (OMEGA-3 CF PO), Take 4 tablets by mouth nightly, Disp: , Rfl:     NONFORMULARY, as needed Indications: C-Testosterone 2%>Petrolatum, Disp: , Rfl:     metFORMIN (GLUCOPHAGE) 500 MG tablet, Take 2,000 mg by mouth daily (with breakfast) , Disp: , Rfl:     amLODIPine (NORVASC) 5 MG tablet, Take 5 mg by mouth nightly 1/2 tab am, Disp: , Rfl:     calcium carbonate-vitamin D 600-200 MG-UNIT TABS, Take 3 tablets by mouth daily. , Disp: , Rfl:     citalopram (CELEXA) 20 MG tablet, Take 20 mg by mouth daily. , Disp: , Rfl:     levothyroxine (SYNTHROID) 75 MCG tablet, Take 75 mcg by mouth daily. , Disp: , Rfl:     metFORMIN (GLUCOPHAGE-XR) 500 MG extended release tablet, Take 500 mg by mouth daily (Patient not taking: Reported on 4/25/2022), Disp: , Rfl:     omeprazole (PRILOSEC) 40 MG delayed release capsule, Take 40 mg by mouth daily (Patient not taking: Reported on 11/18/2022), Disp: , Rfl:     Azelaic Acid 15 % GEL, Apply topically See Admin Instructions (Patient not taking: No sig reported), Disp: , Rfl:     ondansetron (ZOFRAN ODT) 4 MG disintegrating tablet, Take 1 tablet by mouth every 8 hours as needed for Nausea or Vomiting (Patient not taking: No sig reported), Disp: 15 tablet, Rfl: 0    temazepam (RESTORIL) 15 MG capsule, Take 15 mg by mouth nightly as needed for Sleep. (Patient not taking: Reported on 11/18/2022), Disp: , Rfl:     EVENING PRIMROSE OIL PO, Take  by mouth daily. (Patient not taking: Reported on 11/18/2022), Disp: , Rfl:     I reviewed the past medical history, allergies, medications, social history and family history. PE:   Vitals:    11/18/22 0827   BP: (!) 144/96   Site: Right Upper Arm   Position: Sitting   Cuff Size: Large Adult   Pulse: 86   SpO2: 97%   Weight: 249 lb (112.9 kg)   Height: 5' 3\" (1.6 m)     General Appearance:  awake, alert, oriented, in no acute distress  Gen: NAD, Language is Intact. Skin: no rash, lesion, dry  to touch. warm  Head: no rash, no icterus  Neck: There is no carotid bruits. The Neck is supple. There is no neck lymphadenopathy. Neuro: CN 2-12 grossly intact with no focal deficits. Power 5/5 Throughout symmetric, Reflexes are  symmetric. Long tracts are intact. Cerebellar exam is Intact. Sensory exam is intact to light touch. Gait is intact. Musculoskeletal:  Has no hand arthritis, no limitation of ROM in any of the four extremities.   Lower extremities no edema          DATA:        Results for orders placed or performed during the hospital encounter of 11/17/22   Lipid Panel   Result Value Ref Range    Cholesterol, Total 159 100 - 199 mg/dL    Triglycerides 137 0 - 199 mg/dL    HDL 68 mg/dL    LDL Calculated 64 mg/dL   Comprehensive Metabolic Panel   Result Value Ref Range    Glucose 234 (H) 70 - 108 mg/dL    Creatinine 0.7 0.4 - 1.2 mg/dL    BUN 12 7 - 22 mg/dL    Sodium 139 135 - 145 meq/L    Potassium 4.6 3.5 - 5.2 meq/L    Chloride 99 98 - 111 meq/L    CO2 25 23 - 33 meq/L Calcium 9.2 8.5 - 10.5 mg/dL    AST 42 (H) 5 - 40 U/L    Alkaline Phosphatase 96 38 - 126 U/L    Total Protein 7.0 6.1 - 8.0 g/dL    Albumin 4.4 3.5 - 5.1 g/dL    Total Bilirubin 0.9 0.3 - 1.2 mg/dL    ALT 39 11 - 66 U/L   CBC with Auto Differential   Result Value Ref Range    WBC 5.5 4.8 - 10.8 thou/mm3    RBC 4.98 4.20 - 5.40 mill/mm3    Hemoglobin 14.2 12.0 - 16.0 gm/dl    Hematocrit 44.4 37.0 - 47.0 %    MCV 89.2 81.0 - 99.0 fL    MCH 28.5 26.0 - 33.0 pg    MCHC 32.0 (L) 32.2 - 35.5 gm/dl    RDW-CV 13.0 11.5 - 14.5 %    RDW-SD 42.3 35.0 - 45.0 fL    Platelets 372 133 - 973 thou/mm3    MPV 9.6 9.4 - 12.4 fL    Seg Neutrophils 72.1 %    Lymphocytes 16.2 %    Monocytes 7.1 %    Eosinophils 3.3 %    Basophils 0.9 %    Immature Granulocytes 0.4 %    Segs Absolute 4.0 1.8 - 7.7 thou/mm3    Lymphocytes Absolute 0.9 (L) 1.0 - 4.8 thou/mm3    Monocytes Absolute 0.4 0.4 - 1.3 thou/mm3    Eosinophils Absolute 0.2 0.0 - 0.4 thou/mm3    Basophils Absolute 0.0 0.0 - 0.1 thou/mm3    Immature Grans (Abs) 0.02 0.00 - 0.07 thou/mm3    nRBC 0 /100 wbc   Vitamin D 25 Hydroxy   Result Value Ref Range    Vit D, 25-Hydroxy 65 30 - 100 ng/ml   T4, Free   Result Value Ref Range    T4 Free 1.04 0.93 - 1.76 ng/dL   TSH   Result Value Ref Range    TSH 13.520 (H) 0.400 - 4.200 uIU/mL   Microalbumin / Creatinine Urine Ratio   Result Value Ref Range    Microalbumin, Random Urine < 1.20 mg/dL    Creatinine, Urine 122.6 mg/dL    Microalb/Creat Ratio 10 0 - 30 mg/g   Glomerular Filtration Rate, Estimated   Result Value Ref Range    Est, Glom Filt Rate >60 >60 ml/min/1.73m2   Hemoglobin A1C   Result Value Ref Range    Hemoglobin A1C 9.0 (H) 4.4 - 6.4 %    AVERAGE GLUCOSE 210 (H) 70 - 126 mg/dL   Anion Gap   Result Value Ref Range    Anion Gap 15.0 8.0 - 16.0 meq/L          Results for orders placed during the hospital encounter of 11/01/21    MRI BRAIN W WO CONTRAST    Narrative  PROCEDURE: MRI BRAIN W WO CONTRAST    CLINICAL INFORMATIONMultiple sclerosis ([facility] [address] Neck pain. Urinary incontinence and urgency. COMPARISON: Brain MRI 7/7/2020. TECHNIQUE: Multiplanar and multiple spin echo T1 and T2-weighted images were obtained through the brain before and after the administration of intravenous contrast. High resolution sagittal FLAIR images were also obtained. FINDINGS:    Current lesion burden of white matter: mild  Interval since the most recent exam: 17 months. Interval new lesion development: None. Number of pathologically enhancing lesions of the white matter: None. The diffusion-weighted images are normal. The brain volume is normal.There are no intra-or extra-axial collections. There is no hydrocephalus, midline shift or mass effect. On the FLAIR and T2-weighted sequences, there is a mild amount of abnormal signal in the white matter the brain. Most of these are in the deep white matter and radiate outward from the ventricular margins. There are few scattered foci in the subcortical  white matter. All of these were present previously. No new white matter lesions are present. On the gradient echo T2-weighted images, there is no susceptibility artifact present. There is no abnormal enhancement in the brain. The major intracranial vascular flow voids are present. The midline craniocervical junction structures are normal.  The brainstem and pituitary gland are normal.    Impression  1. Mild amount of abnormal signal in the white matter the brain consistent with the patient's history of multiple sclerosis. No new white matter lesions are present. 2. There is no significant change in the appearance of the brain compared to the prior study. **This report has been created using voice recognition software. It may contain minor errors which are inherent in voice recognition technology. **      Final report electronically signed by Dr. Margo Fields on 11/1/2021 3:16 PM    Results for orders placed during the hospital encounter of 11/01/21    MRI CERVICAL SPINE W WO CONTRAST    Narrative  PROCEDURE: MRI CERVICAL SPINE W WO CONTRAST    CLINICAL INFORMATION: Multiple sclerosis ([address] Urinary urgency . Neck pain. COMPARISON: Cervical spine MRI 2/20/2015 and 1/11/2011. TECHNIQUE: Sagittal T1, T2 and STIR sequences were obtained through the cervical spine. Axial fast and echo and gradient echo T2-weighted images were obtained. Postcontrast axial and sagittal T1-weighted images were obtained. FINDINGS:    Motion artifact does degrade the quality of some of these images. These are the best images possible at this time. The cervical vertebral bodies are normally aligned. There is normal marrow signal throughout. There is no bone marrow edema. No suspicious osseous lesions are present. The facet joints are normally aligned. The cervical spinal cord is of normal caliber and signal intensity. There is no abnormal enhancement in the cervical spinal cord. The visualized aspects of the posterior fossa are normal.    On the axial images, the cervical spinal cord has normal signal. The previously visualized abnormal signal to C4 level is no longer present. There are few mild scattered degenerative changes. There is no spinal canal or foraminal stenosis at any level. There are no suspicious findings in the cervical soft tissues. On the postcontrast images, there is no abnormal enhancement. Impression  1. Normal cervical spinal cord. The previously visualized abnormal signal at the C4 level is no longer present. 2. No significant degenerative changes are noted. **This report has been created using voice recognition software. It may contain minor errors which are inherent in voice recognition technology. **    Final report electronically signed by Dr. Yoanna Dennis on 11/1/2021 3:33 PM    No results found for this or any previous visit. Assessment:     Diagnosis Orders   1.  Multiple sclerosis ([address]             Follow up for multiple sclerosis. she is doing well. She denies any new symptoms. No new numbness or weakness, or change in vision. She is on Copaxone, compliant and is tolerating the medication well. She is under a lot of stress recently as her daughter has been ill. After detailed discussion with patient we agreed on the following plan. Plan:  Continue Copaxone 40 mg SC three times a week. Refills given. Follow sleep medicine recommendations  You need to continue wearing your CPAP nightly. Continue with Primrose oil, vitamin D and calcium. Report any new symptoms or concerns.    Follow up in 6 months or sooner if needed  Call if any questions or concerns or new symptoms       Total time 24 min    MATT Shook - CNP

## 2022-12-06 ENCOUNTER — HOSPITAL ENCOUNTER (OUTPATIENT)
Age: 52
Discharge: HOME | End: 2022-12-06
Payer: COMMERCIAL

## 2022-12-06 ENCOUNTER — HOSPITAL ENCOUNTER (OUTPATIENT)
Dept: GENERAL RADIOLOGY | Age: 52
Discharge: HOME | End: 2022-12-06
Payer: COMMERCIAL

## 2022-12-06 DIAGNOSIS — M79.662 PAIN IN LEFT LOWER LEG: ICD-10-CM

## 2022-12-06 DIAGNOSIS — M25.562 LEFT KNEE PAIN, UNSPECIFIED CHRONICITY: ICD-10-CM

## 2022-12-06 PROCEDURE — 73564 X-RAY EXAM KNEE 4 OR MORE: CPT

## 2022-12-06 PROCEDURE — 73590 X-RAY EXAM OF LOWER LEG: CPT

## 2022-12-21 ENCOUNTER — HOSPITAL ENCOUNTER (OUTPATIENT)
Dept: MAMMOGRAPHY | Age: 52
Discharge: HOME | End: 2022-12-21
Payer: COMMERCIAL

## 2022-12-21 DIAGNOSIS — Z12.31 VISIT FOR SCREENING MAMMOGRAM: ICD-10-CM

## 2022-12-21 PROCEDURE — 77063 BREAST TOMOSYNTHESIS BI: CPT

## 2023-03-20 ENCOUNTER — HOSPITAL ENCOUNTER (OUTPATIENT)
Age: 53
Discharge: HOME | End: 2023-03-20
Payer: COMMERCIAL

## 2023-03-20 LAB
T4 FREE SERPL-MCNC: 1.23 NG/DL (ref 0.93–1.76)
TSH SERPL DL<=0.005 MIU/L-ACNC: 5.61 UIU/ML (ref 0.4–4.2)

## 2023-03-20 PROCEDURE — 36415 COLL VENOUS BLD VENIPUNCTURE: CPT

## 2023-03-20 PROCEDURE — 84443 ASSAY THYROID STIM HORMONE: CPT

## 2023-03-20 PROCEDURE — 83036 HEMOGLOBIN GLYCOSYLATED A1C: CPT

## 2023-03-20 PROCEDURE — 84439 ASSAY OF FREE THYROXINE: CPT

## 2023-03-21 LAB
DEPRECATED MEAN GLUCOSE BLD GHB EST-ACNC: 168 MG/DL (ref 70–126)
HBA1C MFR BLD HPLC: 7.6 % (ref 4.4–6.4)

## 2023-03-24 ENCOUNTER — OFFICE VISIT (OUTPATIENT)
Dept: PULMONOLOGY | Age: 53
End: 2023-03-24
Payer: COMMERCIAL

## 2023-03-24 VITALS
SYSTOLIC BLOOD PRESSURE: 122 MMHG | HEART RATE: 68 BPM | TEMPERATURE: 98.1 F | DIASTOLIC BLOOD PRESSURE: 86 MMHG | OXYGEN SATURATION: 98 % | HEIGHT: 63 IN | BODY MASS INDEX: 44.12 KG/M2 | WEIGHT: 249 LBS

## 2023-03-24 DIAGNOSIS — Z99.89 OSA ON CPAP: Primary | ICD-10-CM

## 2023-03-24 DIAGNOSIS — E66.01 CLASS 3 SEVERE OBESITY WITH BODY MASS INDEX (BMI) OF 40.0 TO 44.9 IN ADULT, UNSPECIFIED OBESITY TYPE, UNSPECIFIED WHETHER SERIOUS COMORBIDITY PRESENT (HCC): ICD-10-CM

## 2023-03-24 DIAGNOSIS — G47.33 OSA ON CPAP: Primary | ICD-10-CM

## 2023-03-24 DIAGNOSIS — G35 MULTIPLE SCLEROSIS (HCC): ICD-10-CM

## 2023-03-24 PROCEDURE — 99214 OFFICE O/P EST MOD 30 MIN: CPT

## 2023-03-24 ASSESSMENT — ENCOUNTER SYMPTOMS
EYE ITCHING: 1
SHORTNESS OF BREATH: 0
ALLERGIC/IMMUNOLOGIC COMMENTS: SEASONAL ALLERGIES
WHEEZING: 0
COUGH: 0

## 2023-03-24 NOTE — PROGRESS NOTES
[facility]      Gonzalo Maza         [id number]  3/24/2023   Chief Complaint   Patient presents with    Follow-up     1yr REJI f/u w/[facility] download. States she does well when she wears it. Notes she has a special needs daughter who needs caring for. Pt of Dr. Dali Sierra    PAP Download:   Original or initial AHI: 12.0     Date of initial study: 8/6/2021      Compliant  30%     Noncompliant 70 %     PAP Type CPAP    Level  46woM2G   Avg Hrs/Day 4hrs 8mins  AHI: 2.2   Recorded compliance dates , 2/19/23  to 3/20/23   Machine/Mfg:   [x] ResMed    [] Respironics/Dreamstation   Interface:   [] Nasal    [] Nasal pillows   [x] FFM      Provider:      [x] -TEODORA     []Ladonna     [] Génesis    [] Mariza Clarke    [] Amparoietermans               [] [facility] [facility]      [] Adaptive    [] [address]      [] Other    Neck Size: 19  Mallampati 3  ESS:  5  SAQLI: 85    Here is a scan of the most recent download:                Presentation:   Jorge Rojo presents for sleep medicine follow up for obstructive sleep apnea  Since the last visit, Jorge Rojo has not been wearing her CPAP nightly. She reports that her daughter has had multiple health issues and hospitalizations over the last year and she has not brought her CPAP with her when she has been away from home. She also reports that she will sleep with her daughter on the couch some nights and she does not bring her CPAP in the living room with her. She does get benefit when she wears her CPAP and she denies any issues/discomfort with wearing the CPAP. She does wish to adjust her temperature settings. She has been having some daytime sleepiness due to her work schedule and her abnormal thyroid levels. 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 She has history of MS and follows with Dr. Martinez Co - reports stable symptoms. Weight gained 7 lbs over 1 year    Equipment issues:   The pressure is

## 2023-04-28 ENCOUNTER — HOSPITAL ENCOUNTER (EMERGENCY)
Age: 53
Discharge: HOME | End: 2023-04-28
Attending: FAMILY MEDICINE
Payer: OTHER GOVERNMENT

## 2023-04-28 ENCOUNTER — APPOINTMENT (OUTPATIENT)
Dept: CT IMAGING | Age: 53
End: 2023-04-28
Payer: OTHER GOVERNMENT

## 2023-04-28 VITALS
SYSTOLIC BLOOD PRESSURE: 128 MMHG | BODY MASS INDEX: 42.68 KG/M2 | RESPIRATION RATE: 18 BRPM | DIASTOLIC BLOOD PRESSURE: 78 MMHG | HEIGHT: 64 IN | WEIGHT: 250 LBS | HEART RATE: 79 BPM | TEMPERATURE: 96.8 F | OXYGEN SATURATION: 97 %

## 2023-04-28 DIAGNOSIS — R19.8 PEPTIC ULCER SYMPTOMS: Primary | ICD-10-CM

## 2023-04-28 LAB
ALBUMIN SERPL BCP-MCNC: 3.8 GM/DL (ref 3.4–5)
ALP SERPL-CCNC: 91 U/L (ref 46–116)
ALT SERPL W P-5'-P-CCNC: 48 U/L (ref 14–63)
AMORPH SED URNS QL MICRO: NORMAL
ANION GAP SERPL CALC-SCNC: 9 MEQ/L (ref 8–16)
AST SERPL W P-5'-P-CCNC: 33 U/L (ref 15–37)
BACTERIA URNS QL MICRO: NORMAL
BASOPHILS # BLD: 0.4 % (ref 0–3)
BASOPHILS ABSOLUTE: 0 THOU/MM3 (ref 0–0.1)
BILIRUB DIRECT SERPL-MCNC: 0.24 MG/DL (ref 0–0.2)
BILIRUB SERPL-MCNC: 1 MG/DL (ref 0.2–1)
BILIRUB UR QL STRIP.AUTO: NEGATIVE
BUN SERPL-MCNC: 19 MG/DL (ref 7–18)
CALCIUM SERPL-MCNC: 9.3 MG/DL (ref 8.5–10.1)
CASTS #/AREA URNS LPF: NORMAL /LPF
CHARACTER UR: CLEAR
CHLORIDE SERPL-SCNC: 103 MEQ/L (ref 98–107)
CO2 SERPL-SCNC: 25 MEQ/L (ref 21–32)
COLOR UR AUTO: YELLOW
CREAT SERPL-MCNC: 1.1 MG/DL (ref 0.6–1.3)
CRYSTALS VITF MICRO: NORMAL
EOSINOPHILS ABSOLUTE: 0.3 THOU/MM3 (ref 0–0.5)
EOSINOPHILS RELATIVE PERCENT: 3.3 % (ref 0–4)
EPI CELLS #/AREA URNS HPF: NORMAL /HPF
GFR SERPL CREATININE-BSD FRML MDRD: 59 ML/MIN/1.73M2
GLUCOSE SERPL-MCNC: 242 MG/DL (ref 74–106)
GLUCOSE UR QL STRIP.AUTO: >= 1000 MG/DL
HCT VFR BLD CALC: 41.4 % (ref 37–47)
HEMOGLOBIN: 13.9 GM/DL (ref 12–16)
HGB UR STRIP.AUTO-MCNC: NEGATIVE MG/L
IMMATURE GRANS (ABS): 0.01 THOU/MM3 (ref 0–0.07)
IMMATURE GRANULOCYTES: 0 %
KETONES UR QL STRIP.AUTO: NEGATIVE
LACTATE: 1.8 MMOL/L (ref 0.9–1.7)
LEUKOCYTE ESTERASE UR QL STRIP.AUTO: NEGATIVE
LIPASE SERPL-CCNC: 203 U/L (ref 73–393)
LYMPHOCYTES # BLD AUTO: 41.9 % (ref 15–47)
LYMPHOCYTES ABSOLUTE: 3.2 THOU/MM3 (ref 1–4.8)
MCH RBC QN AUTO: 28.8 PG (ref 26–32)
MCHC RBC AUTO-ENTMCNC: 33.6 GM/DL (ref 31–35)
MCV RBC AUTO: 85.7 FL (ref 81–99)
MONOCYTES: 0.7 THOU/MM3 (ref 0.3–1.3)
MONOCYTES: 8.8 % (ref 0–12)
MUCOUS THREADS URNS QL MICRO: NORMAL
NITRITE UR QL STRIP.AUTO: NEGATIVE
PDW BLD-RTO: 13.3 % (ref 11.5–14.9)
PH UR STRIP.AUTO: 5 [PH] (ref 5–9)
PLATELET # BLD AUTO: 291 THOU/MM3 (ref 130–400)
PMV BLD AUTO: 9.5 FL (ref 9.4–12.4)
POTASSIUM SERPL-SCNC: 3.7 MEQ/L (ref 3.5–5.1)
PROT SERPL-MCNC: 7.7 GM/DL (ref 6.4–8.2)
PROT UR STRIP.AUTO-MCNC: NEGATIVE MG/DL
RBC # BLD: 4.83 MILL/MM3 (ref 4.1–5.3)
RBC #/AREA URNS HPF: NORMAL /HPF
SEG NEUTROPHILS: 45.5 % (ref 43–75)
SEGMENTED NEUTROPHILS ABSOLUTE COUNT: 3.5 THOU/MM3 (ref 1.8–7.7)
SODIUM SERPL-SCNC: 137 MEQ/L (ref 136–145)
SP GR UR STRIP.AUTO: 1.01 (ref 1–1.03)
UROBILINOGEN UR STRIP-ACNC: 0.2 EU/DL (ref 0.2–1)
WBC # BLD: 7.7 THOU/MM3 (ref 4.8–10.8)
WBC # UR STRIP.AUTO: NORMAL /HPF

## 2023-04-28 PROCEDURE — 96374 THER/PROPH/DIAG INJ IV PUSH: CPT

## 2023-04-28 PROCEDURE — 74177 CT ABD & PELVIS W/CONTRAST: CPT

## 2023-04-28 PROCEDURE — 83690 ASSAY OF LIPASE: CPT

## 2023-04-28 PROCEDURE — 81003 URINALYSIS AUTO W/O SCOPE: CPT

## 2023-04-28 PROCEDURE — 83605 ASSAY OF LACTIC ACID: CPT

## 2023-04-28 PROCEDURE — 6360000002 HC RX W HCPCS: Performed by: FAMILY MEDICINE

## 2023-04-28 PROCEDURE — 85025 COMPLETE CBC W/AUTO DIFF WBC: CPT

## 2023-04-28 PROCEDURE — 80076 HEPATIC FUNCTION PANEL: CPT

## 2023-04-28 PROCEDURE — 6360000004 HC RX CONTRAST MEDICATION: Performed by: FAMILY MEDICINE

## 2023-04-28 PROCEDURE — 99285 EMERGENCY DEPT VISIT HI MDM: CPT

## 2023-04-28 PROCEDURE — 80048 BASIC METABOLIC PNL TOTAL CA: CPT

## 2023-04-28 PROCEDURE — 81001 URINALYSIS AUTO W/SCOPE: CPT

## 2023-04-28 RX ORDER — HYDROCODONE BITARTRATE AND ACETAMINOPHEN 5; 325 MG/1; MG/1
1 TABLET ORAL EVERY 4 HOURS PRN
Qty: 18 TABLET | Refills: 0 | Status: SHIPPED | OUTPATIENT
Start: 2023-04-28 | End: 2023-05-01

## 2023-04-28 RX ORDER — KETOROLAC TROMETHAMINE 30 MG/ML
30 INJECTION, SOLUTION INTRAMUSCULAR; INTRAVENOUS ONCE
Status: COMPLETED | OUTPATIENT
Start: 2023-04-28 | End: 2023-04-28

## 2023-04-28 RX ORDER — SUCRALFATE 1 G/1
1 TABLET ORAL 4 TIMES DAILY
Qty: 120 TABLET | Refills: 3 | Status: SHIPPED | OUTPATIENT
Start: 2023-04-28

## 2023-04-28 RX ORDER — ESOMEPRAZOLE MAGNESIUM 40 MG/1
40 CAPSULE, DELAYED RELEASE ORAL
Qty: 30 CAPSULE | Refills: 0 | Status: SHIPPED | OUTPATIENT
Start: 2023-04-28

## 2023-04-28 RX ADMIN — IOPAMIDOL 100 ML: 755 INJECTION, SOLUTION INTRAVENOUS at 00:38

## 2023-04-28 RX ADMIN — KETOROLAC TROMETHAMINE 30 MG: 30 INJECTION, SOLUTION INTRAMUSCULAR at 00:25

## 2023-04-28 ASSESSMENT — PAIN SCALES - GENERAL
PAINLEVEL_OUTOF10: 6
PAINLEVEL_OUTOF10: 6

## 2023-04-28 ASSESSMENT — LIFESTYLE VARIABLES
HOW OFTEN DO YOU HAVE A DRINK CONTAINING ALCOHOL: NEVER
HOW MANY STANDARD DRINKS CONTAINING ALCOHOL DO YOU HAVE ON A TYPICAL DAY: PATIENT DOES NOT DRINK

## 2023-04-28 ASSESSMENT — PAIN DESCRIPTION - LOCATION: LOCATION: ABDOMEN

## 2023-04-28 ASSESSMENT — PAIN DESCRIPTION - ORIENTATION: ORIENTATION: MID

## 2023-04-28 NOTE — ED PROVIDER NOTES
[facility]  eMERGENCY dEPARTMENT eNCOUnter          CHIEF COMPLAINT       Chief Complaint   Patient presents with    Abdominal Pain    Nausea       Nurses Notes reviewed and I agree except as noted in the HPI. HISTORY OF PRESENT ILLNESS    Slime Joaquin is a 52 y.o. female who presents to the Emergency Department for the evaluation of right upper quadrant abdominal pain. Patient reports that she had onion rings for lunch today. She said ever since then she has noticed dull upper abdominal pain. She denies any vomiting but she does complain of intermittent nausea. She denies any nausea right now. She rates her pain a 6 out of 10 in intensity. Denies any fevers or chills. She reports prior history of  and hysterectomy. She reports no appendectomy. She reports no cholecystectomy. The HPI was provided by the patient. REVIEW OF SYSTEMS     Review of Systems   All other systems reviewed and are negative. MEDICAL HISTORY    has a past medical history of Depression, Diabetes mellitus ([address] Hypertension, Hypothyroidism, Multiple sclerosis ([address] Sleep apnea, and Ulcer of abdomen wall ([address] SURGICAL HISTORY      has a past surgical history that includes Breast surgery (2012);  section; Tubal ligation; Upper gastrointestinal endoscopy; Tonsillectomy; Colonoscopy; eye surgery (? ? ); Foot surgery (Right, 2013); Dilation and curettage of uterus (7/15/2014); Endometrial ablation (7/15/2014); pr hysteroscopy bx endometrium&/polypc w/wo d&c (N/A, 2018); [place] tooth extraction; HYSTERECTOMY ABDOMINAL (N/A, 2019); Facial Surgery (Right, 2020); [facility] STEREO BREAST BX W LOC DEVICE 1ST LESION LEFT (Left, ); Salpingo-oophorectomy (Right, 4/28/15); Salpingo-oophorectomy (Left); Hysterectomy; and Upper gastrointestinal endoscopy ().     CURRENT MEDICATIONS       Previous Medications    AMLODIPINE (NORVASC) 5 MG TABLET    Take 1 tablet by mouth

## 2023-04-28 NOTE — ED NOTES
Discharge teaching and instructions for condition explained to patient. AVS reviewed. Went over prescriptions with patient. Patient voiced understanding regarding prescriptions, follow up appointments and care of self at home. Pt discharged to home in stable condition per self.        Frieda Gomes RN  04/28/23 0121

## 2023-04-28 NOTE — ED TRIAGE NOTES
Presents from home. C/o mid abdominal pain worsening through the day. Pain rated 6/10. States she has known abdominal issues including a sluggish gall bladder and an ulcer in the past.  Nausea frequently. Denies cp or sob. Calm and cooperative. Color appropriate. Respirations easy and unlabored.  Skin warm and dry

## 2023-05-08 ENCOUNTER — HOSPITAL ENCOUNTER (OUTPATIENT)
Age: 53
Discharge: HOME | End: 2023-05-08
Payer: COMMERCIAL

## 2023-05-08 LAB
BILIRUB UR QL STRIP.AUTO: NEGATIVE
CHARACTER UR: CLEAR
COLOR: YELLOW
GLUCOSE UR QL STRIP.AUTO: >= 1000 MG/DL
HGB UR QL STRIP.AUTO: NEGATIVE
KETONES UR QL STRIP.AUTO: NEGATIVE
NITRITE UR QL STRIP: NEGATIVE
PH UR STRIP.AUTO: 5.5 [PH] (ref 5–9)
PROT UR STRIP.AUTO-MCNC: NEGATIVE MG/DL
SP GR UR REFRACT.AUTO: > 1.03 (ref 1–1.03)
UROBILINOGEN, URINE: 0.2 EU/DL (ref 0–1)
WBC #/AREA URNS HPF: NEGATIVE /[HPF]

## 2023-05-08 PROCEDURE — 81003 URINALYSIS AUTO W/O SCOPE: CPT

## 2023-05-19 ENCOUNTER — OFFICE VISIT (OUTPATIENT)
Dept: NEUROLOGY | Age: 53
End: 2023-05-19
Payer: COMMERCIAL

## 2023-05-19 VITALS
HEIGHT: 64 IN | DIASTOLIC BLOOD PRESSURE: 75 MMHG | HEART RATE: 64 BPM | WEIGHT: 242 LBS | OXYGEN SATURATION: 97 % | BODY MASS INDEX: 41.32 KG/M2 | SYSTOLIC BLOOD PRESSURE: 125 MMHG

## 2023-05-19 DIAGNOSIS — G35 MULTIPLE SCLEROSIS (HCC): Primary | ICD-10-CM

## 2023-05-19 DIAGNOSIS — R29.898 RIGHT LEG WEAKNESS: ICD-10-CM

## 2023-05-19 PROCEDURE — G8417 CALC BMI ABV UP PARAM F/U: HCPCS | Performed by: PSYCHIATRY & NEUROLOGY

## 2023-05-19 PROCEDURE — 3017F COLORECTAL CA SCREEN DOC REV: CPT | Performed by: PSYCHIATRY & NEUROLOGY

## 2023-05-19 PROCEDURE — G8427 DOCREV CUR MEDS BY ELIG CLIN: HCPCS | Performed by: PSYCHIATRY & NEUROLOGY

## 2023-05-19 PROCEDURE — 1036F TOBACCO NON-USER: CPT | Performed by: PSYCHIATRY & NEUROLOGY

## 2023-05-19 PROCEDURE — 99214 OFFICE O/P EST MOD 30 MIN: CPT | Performed by: PSYCHIATRY & NEUROLOGY

## 2023-06-06 ENCOUNTER — HOSPITAL ENCOUNTER (OUTPATIENT)
Age: 53
Discharge: HOME | End: 2023-06-06
Payer: COMMERCIAL

## 2023-06-06 LAB
T4 FREE SERPL-MCNC: 1.54 NG/DL (ref 0.93–1.76)
TSH SERPL DL<=0.005 MIU/L-ACNC: 4.48 UIU/ML (ref 0.4–4.2)

## 2023-06-06 PROCEDURE — 84439 ASSAY OF FREE THYROXINE: CPT

## 2023-06-06 PROCEDURE — 36415 COLL VENOUS BLD VENIPUNCTURE: CPT

## 2023-06-06 PROCEDURE — 84443 ASSAY THYROID STIM HORMONE: CPT

## 2023-06-07 ENCOUNTER — HOSPITAL ENCOUNTER (OUTPATIENT)
Dept: MRI IMAGING | Age: 53
Discharge: HOME | End: 2023-06-07
Payer: COMMERCIAL

## 2023-06-07 ENCOUNTER — HOSPITAL ENCOUNTER (OUTPATIENT)
Dept: WOMENS IMAGING | Age: 53
Discharge: HOME | End: 2023-06-07
Payer: COMMERCIAL

## 2023-06-07 ENCOUNTER — TELEPHONE (OUTPATIENT)
Dept: NEUROLOGY | Age: 53
End: 2023-06-07

## 2023-06-07 DIAGNOSIS — N64.4 PAIN OF LEFT BREAST: ICD-10-CM

## 2023-06-07 DIAGNOSIS — R29.898 RIGHT LEG WEAKNESS: ICD-10-CM

## 2023-06-07 DIAGNOSIS — G35 MULTIPLE SCLEROSIS (HCC): ICD-10-CM

## 2023-06-07 LAB — POC CREATININE WHOLE BLOOD: 0.7 MG/DL (ref 0.5–1.2)

## 2023-06-07 PROCEDURE — 82565 ASSAY OF CREATININE: CPT

## 2023-06-07 PROCEDURE — 76642 ULTRASOUND BREAST LIMITED: CPT

## 2023-06-07 PROCEDURE — 72157 MRI CHEST SPINE W/O & W/DYE: CPT

## 2023-06-07 PROCEDURE — 6360000004 HC RX CONTRAST MEDICATION: Performed by: PSYCHIATRY & NEUROLOGY

## 2023-06-07 PROCEDURE — A9579 GAD-BASE MR CONTRAST NOS,1ML: HCPCS | Performed by: PSYCHIATRY & NEUROLOGY

## 2023-06-07 PROCEDURE — G0279 TOMOSYNTHESIS, MAMMO: HCPCS

## 2023-06-07 RX ADMIN — GADOTERIDOL 20 ML: 279.3 INJECTION, SOLUTION INTRAVENOUS at 08:52

## 2023-06-07 NOTE — TELEPHONE ENCOUNTER
----- Message from Monica Zhou MD sent at 6/7/2023 10:16 AM EDT -----  Please let patient know MRI Thoracic spine shows no abnormality.    Monica Zhou MD 10:16 AM

## 2023-06-08 DIAGNOSIS — Z09 FOLLOW-UP EXAM: ICD-10-CM

## 2023-06-08 DIAGNOSIS — T14.8XXA HEMATOMA: Primary | ICD-10-CM

## 2023-07-28 ENCOUNTER — OFFICE VISIT (OUTPATIENT)
Dept: NEUROLOGY | Age: 53
End: 2023-07-28
Payer: COMMERCIAL

## 2023-07-28 VITALS
OXYGEN SATURATION: 99 % | HEART RATE: 59 BPM | WEIGHT: 242 LBS | SYSTOLIC BLOOD PRESSURE: 130 MMHG | BODY MASS INDEX: 41.32 KG/M2 | HEIGHT: 64 IN | DIASTOLIC BLOOD PRESSURE: 85 MMHG

## 2023-07-28 DIAGNOSIS — G35 MULTIPLE SCLEROSIS (HCC): ICD-10-CM

## 2023-07-28 PROCEDURE — 99213 OFFICE O/P EST LOW 20 MIN: CPT | Performed by: NURSE PRACTITIONER

## 2023-07-28 RX ORDER — GLATIRAMER 40 MG/ML
INJECTION, SOLUTION SUBCUTANEOUS
Qty: 36 EACH | Refills: 1 | Status: SHIPPED | OUTPATIENT
Start: 2023-07-28

## 2023-07-28 NOTE — PATIENT INSTRUCTIONS
Continue Copaxone 40 mg SC three times a week. Refills given. You need to  wear your CPAP nightly. Continue with Primrose oil, vitamin D and calcium. Report any new symptoms or concerns.    Follow up in 6 months or sooner if needed  Call if any questions or concerns or new symptoms

## 2023-07-28 NOTE — PROGRESS NOTES
aligned. The cervical spinal cord is of normal caliber and signal intensity. There is no abnormal enhancement in the cervical spinal cord. The visualized aspects of the posterior fossa are normal.    On the axial images, the cervical spinal cord has normal signal. The previously visualized abnormal signal to C4 level is no longer present. There are few mild scattered degenerative changes. There is no spinal canal or foraminal stenosis at any level. There are no suspicious findings in the cervical soft tissues. On the postcontrast images, there is no abnormal enhancement. Impression  1. Normal cervical spinal cord. The previously visualized abnormal signal at the C4 level is no longer present. 2. No significant degenerative changes are noted. **This report has been created using voice recognition software. It may contain minor errors which are inherent in voice recognition technology. **    Final report electronically signed by Dr. Yanet Metz on 11/1/2021 3:33 PM    No results found for this or any previous visit. Assessment:     Diagnosis Orders   1. Multiple sclerosis (HCC)  glatiramer (COPAXONE) 40 MG/ML injection           Follow up for multiple sclerosis. She is doing the same. No new symptoms. She denies any new numbness or weakness. She is on Copaxone, and is tolerating the medication well. I shared with her that her MRI t-spine W/Wo contrast done 6/7/23 showed no concerning findings. She is complaining of trouble focusing, especially at work. She shared with me she does not always wear her CPAP, and when she does wear it, she only wears it for 4 hours a night. She is dealing with a lot of stress as she has a daughter going through some health issues. In retrospect, when she did wear her CPAP consistently she felt more focused. After detailed discussion with patient we agreed on the following plan. Plan:  Continue Copaxone 40 mg SC three times a week. Refills given.

## 2023-09-07 ENCOUNTER — HOSPITAL ENCOUNTER (OUTPATIENT)
Age: 53
Discharge: HOME | End: 2023-09-07
Payer: COMMERCIAL

## 2023-09-07 LAB
BASOPHILS ABSOLUTE: 0.1 THOU/MM3 (ref 0–0.1)
BASOPHILS NFR BLD AUTO: 0.9 %
DEPRECATED RDW RBC AUTO: 43.6 FL (ref 35–45)
EOSINOPHIL NFR BLD AUTO: 1.2 %
EOSINOPHILS ABSOLUTE: 0.1 THOU/MM3 (ref 0–0.4)
ERYTHROCYTE [DISTWIDTH] IN BLOOD BY AUTOMATED COUNT: 13.5 % (ref 11.5–14.5)
HCT VFR BLD AUTO: 42.4 % (ref 37–47)
HGB BLD-MCNC: 13.7 GM/DL (ref 12–16)
IMM GRANULOCYTES # BLD AUTO: 0.01 THOU/MM3 (ref 0–0.07)
IMM GRANULOCYTES NFR BLD AUTO: 0.2 %
LYMPHOCYTES ABSOLUTE: 2.3 THOU/MM3 (ref 1–4.8)
LYMPHOCYTES NFR BLD AUTO: 39.4 %
MCH RBC QN AUTO: 28.4 PG (ref 26–33)
MCHC RBC AUTO-ENTMCNC: 32.3 GM/DL (ref 32.2–35.5)
MCV RBC AUTO: 88 FL (ref 81–99)
MONOCYTES ABSOLUTE: 0.4 THOU/MM3 (ref 0.4–1.3)
MONOCYTES NFR BLD AUTO: 7.6 %
NEUTROPHILS NFR BLD AUTO: 50.7 %
NRBC BLD AUTO-RTO: 0 /100 WBC
PLATELET # BLD AUTO: 199 THOU/MM3 (ref 130–400)
PMV BLD AUTO: 9.9 FL (ref 9.4–12.4)
RBC # BLD AUTO: 4.82 MILL/MM3 (ref 4.2–5.4)
SEGMENTED NEUTROPHILS ABSOLUTE COUNT: 2.9 THOU/MM3 (ref 1.8–7.7)
WBC # BLD AUTO: 5.8 THOU/MM3 (ref 4.8–10.8)

## 2023-09-07 PROCEDURE — 80061 LIPID PANEL: CPT

## 2023-09-07 PROCEDURE — 84439 ASSAY OF FREE THYROXINE: CPT

## 2023-09-07 PROCEDURE — 85025 COMPLETE CBC W/AUTO DIFF WBC: CPT

## 2023-09-07 PROCEDURE — 83690 ASSAY OF LIPASE: CPT

## 2023-09-07 PROCEDURE — 80053 COMPREHEN METABOLIC PANEL: CPT

## 2023-09-07 PROCEDURE — 36415 COLL VENOUS BLD VENIPUNCTURE: CPT

## 2023-09-07 PROCEDURE — 83036 HEMOGLOBIN GLYCOSYLATED A1C: CPT

## 2023-09-07 PROCEDURE — 87338 HPYLORI STOOL AG IA: CPT

## 2023-09-07 PROCEDURE — 82043 UR ALBUMIN QUANTITATIVE: CPT

## 2023-09-07 PROCEDURE — 84443 ASSAY THYROID STIM HORMONE: CPT

## 2023-09-07 PROCEDURE — 84481 FREE ASSAY (FT-3): CPT

## 2023-09-07 PROCEDURE — 82150 ASSAY OF AMYLASE: CPT

## 2023-09-07 NOTE — PROGRESS NOTES
[facility]      [facility]         [id number]  9/8/2023   Chief Complaint   Patient presents with    Follow-up     Ambrosio 6 month f/u        Pt of Dr. Paulo Hilton     PAP Download:   Original or initial AHI: 12.0     Date of initial study: 8/6/2021      Compliant  23%     Noncompliant 30 %     PAP Type CPAP  Level  11 cmH2O  Avg Hrs/Day 4 hours 21 minutes  AHI: 3.7   Recorded compliance dates: 8/7/23 to 9/5/23  Machine/Mfg:   [x] ResMed    [] Respironics/Dreamstation   Interface:   [] Nasal    [] Nasal pillows   [x] FFM      Provider:      [x] [facility]     [][employer]     [] [employer]    [] [place]    [] [place]               [] [facility] [facility]      [] Adaptive    [] [address]:      [] Other    Neck Size: 19  Mallampati Mallampati 3  ESS:  9  SAQLI: 80    Here is a scan of the most recent download:      Presentation:   Zaida Ricardo presents for sleep medicine follow up for obstructive sleep apnea  Since the last visit, Zaida Ricardo is having difficulty with compliance with PAP. She reports she started to have a dry mouth about 2-3 weeks ago. She is also struggling with her FFM. She was started on a new medication - Tempezza infusions for her eyes which makes her tired. She has also been having stomach issues. She reports that she often sleeps in her recliner and therefore does not use her machine. She is asking about a 2nd machine for her living room. She reports some daytime tiredness. 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 Dino Trinh     Weight lost 20 lbs over 6 months - reports this was due to stomach issues     Equipment issues: The pressure is  acceptable, the mask is unacceptable     Sleep issues:  Do you feel better? Yes - no longer drowsy while driving  More rested? Yes   Better concentration? yes  Difficulty falling sleep? No  Difficulty staying asleep? No  Snoring?   If not wearing her mask    Progress History:   Since last visit any new medical

## 2023-09-08 ENCOUNTER — OFFICE VISIT (OUTPATIENT)
Dept: PULMONOLOGY | Age: 53
End: 2023-09-08
Payer: COMMERCIAL

## 2023-09-08 VITALS
TEMPERATURE: 96.9 F | BODY MASS INDEX: 39.16 KG/M2 | HEIGHT: 64 IN | HEART RATE: 70 BPM | SYSTOLIC BLOOD PRESSURE: 118 MMHG | WEIGHT: 229.4 LBS | OXYGEN SATURATION: 99 % | DIASTOLIC BLOOD PRESSURE: 80 MMHG

## 2023-09-08 DIAGNOSIS — G47.33 OSA ON CPAP: Primary | ICD-10-CM

## 2023-09-08 DIAGNOSIS — E66.09 CLASS 2 OBESITY DUE TO EXCESS CALORIES WITH BODY MASS INDEX (BMI) OF 39.0 TO 39.9 IN ADULT, UNSPECIFIED WHETHER SERIOUS COMORBIDITY PRESENT: ICD-10-CM

## 2023-09-08 DIAGNOSIS — Z99.89 OSA ON CPAP: Primary | ICD-10-CM

## 2023-09-08 PROBLEM — E66.812 CLASS 2 OBESITY DUE TO EXCESS CALORIES WITH BODY MASS INDEX (BMI) OF 39.0 TO 39.9 IN ADULT: Status: ACTIVE | Noted: 2023-09-08

## 2023-09-08 LAB
ALBUMIN SERPL BCG-MCNC: 4.1 G/DL (ref 3.5–5.1)
ALP SERPL-CCNC: 74 U/L (ref 38–126)
ALT SERPL W/O P-5'-P-CCNC: 35 U/L (ref 11–66)
AMYLASE SERPL-CCNC: 51 U/L (ref 20–104)
ANION GAP SERPL CALC-SCNC: 13 MEQ/L (ref 8–16)
AST SERPL-CCNC: 32 U/L (ref 5–40)
BILIRUB SERPL-MCNC: 1.7 MG/DL (ref 0.3–1.2)
BUN SERPL-MCNC: 13 MG/DL (ref 7–22)
CALCIUM SERPL-MCNC: 9.3 MG/DL (ref 8.5–10.5)
CHLORIDE SERPL-SCNC: 97 MEQ/L (ref 98–111)
CHOLESTEROL, FASTING: 119 MG/DL (ref 100–199)
CO2 SERPL-SCNC: 24 MEQ/L (ref 23–33)
CREAT SERPL-MCNC: 0.5 MG/DL (ref 0.4–1.2)
CREAT UR-MCNC: 132 MG/DL
DEPRECATED MEAN GLUCOSE BLD GHB EST-ACNC: 261 MG/DL (ref 70–126)
GFR SERPL CREATININE-BSD FRML MDRD: > 60 ML/MIN/1.73M2
GLUCOSE FASTING: 307 MG/DL (ref 70–108)
HBA1C MFR BLD HPLC: 10.7 % (ref 4.4–6.4)
HDLC SERPL-MCNC: 55 MG/DL
LDLC SERPL CALC-MCNC: 38 MG/DL
LIPASE SERPL-CCNC: 35.4 U/L (ref 5.6–51.3)
MICROALBUMIN UR-MCNC: < 1.2 MG/DL
MICROALBUMIN/CREAT RATIO PNL UR: 9 MG/G (ref 0–30)
POTASSIUM SERPL-SCNC: 4.1 MEQ/L (ref 3.5–5.2)
PROT SERPL-MCNC: 7.4 G/DL (ref 6.1–8)
SODIUM SERPL-SCNC: 134 MEQ/L (ref 135–145)
T3FREE SERPL-MCNC: 2.36 PG/ML (ref 2.02–4.43)
T4 FREE SERPL-MCNC: 2.04 NG/DL (ref 0.93–1.76)
TRIGLYCERIDE, FASTING: 131 MG/DL (ref 0–199)
TSH SERPL DL<=0.005 MIU/L-ACNC: 0.21 UIU/ML (ref 0.4–4.2)

## 2023-09-08 PROCEDURE — 99214 OFFICE O/P EST MOD 30 MIN: CPT

## 2023-09-09 LAB — H PYLORI AG STL QL IA: NORMAL

## 2023-09-15 ENCOUNTER — APPOINTMENT (OUTPATIENT)
Dept: GENERAL RADIOLOGY | Age: 53
End: 2023-09-15
Payer: COMMERCIAL

## 2023-09-15 ENCOUNTER — HOSPITAL ENCOUNTER (OUTPATIENT)
Dept: ULTRASOUND IMAGING | Age: 53
Discharge: HOME | End: 2023-09-15
Payer: COMMERCIAL

## 2023-09-15 ENCOUNTER — HOSPITAL ENCOUNTER (EMERGENCY)
Age: 53
Discharge: HOME | End: 2023-09-15
Attending: EMERGENCY MEDICINE
Payer: COMMERCIAL

## 2023-09-15 VITALS
DIASTOLIC BLOOD PRESSURE: 74 MMHG | HEIGHT: 64 IN | TEMPERATURE: 97 F | SYSTOLIC BLOOD PRESSURE: 152 MMHG | BODY MASS INDEX: 38.76 KG/M2 | WEIGHT: 227 LBS | HEART RATE: 55 BPM | OXYGEN SATURATION: 97 % | RESPIRATION RATE: 14 BRPM

## 2023-09-15 DIAGNOSIS — K21.9 CHALASIA OF LOWER ESOPHAGEAL SPHINCTER: ICD-10-CM

## 2023-09-15 DIAGNOSIS — S93.601A FOOT SPRAIN, RIGHT, INITIAL ENCOUNTER: ICD-10-CM

## 2023-09-15 DIAGNOSIS — S50.01XA CONTUSION OF RIGHT ELBOW, INITIAL ENCOUNTER: ICD-10-CM
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
## 2023-09-15 DIAGNOSIS — R10.11 RIGHT UPPER QUADRANT PAIN: ICD-10-CM

## 2023-09-15 PROCEDURE — 73060 X-RAY EXAM OF HUMERUS: CPT

## 2023-09-15 PROCEDURE — 73630 X-RAY EXAM OF FOOT: CPT

## 2023-09-15 PROCEDURE — 99283 EMERGENCY DEPT VISIT LOW MDM: CPT

## 2023-09-15 PROCEDURE — 73080 X-RAY EXAM OF ELBOW: CPT

## 2023-09-15 PROCEDURE — 76705 ECHO EXAM OF ABDOMEN: CPT

## 2023-09-15 ASSESSMENT — LIFESTYLE VARIABLES: HOW OFTEN DO YOU HAVE A DRINK CONTAINING ALCOHOL: NEVER

## 2023-09-15 ASSESSMENT — PAIN DESCRIPTION - LOCATION
LOCATION_2: ARM
LOCATION: FOOT
LOCATION: FOOT

## 2023-09-15 ASSESSMENT — PAIN - FUNCTIONAL ASSESSMENT
PAIN_FUNCTIONAL_ASSESSMENT: 0-10
PAIN_FUNCTIONAL_ASSESSMENT: 0-10

## 2023-09-15 ASSESSMENT — PAIN DESCRIPTION - ORIENTATION
ORIENTATION_2: RIGHT;UPPER
ORIENTATION: RIGHT
ORIENTATION: RIGHT

## 2023-09-15 ASSESSMENT — PAIN SCALES - GENERAL
PAINLEVEL_OUTOF10: 6
PAINLEVEL_OUTOF10: 7

## 2023-09-15 ASSESSMENT — PAIN DESCRIPTION - INTENSITY: RATING_2: 4

## 2023-09-15 ASSESSMENT — ENCOUNTER SYMPTOMS: BACK PAIN: 0

## 2023-09-15 NOTE — ED NOTES
Pt alert and oriented. Respirations regular and easy. Discharge instructions reviewed. States understanding. Pt discharged in satisfactory condition.        Alaina Barrios RN  09/15/23 [id number]

## 2023-09-15 NOTE — ED NOTES
Pt presents w/ right foot injury and right upper arm pain/ injury. States that on Wednesday, while walking on uneven pavement, she tripped and fell. Injuring her right foot, bruising noted to top of foot. States that she is now noticing pain to her right upper arm, mild swelling noted.       Michelle Serna RN  09/15/23 [id number]

## 2023-09-15 NOTE — DISCHARGE INSTRUCTIONS
Counter pain medication as needed. Wear supportive, lace up shoe on the injured foot. Elevate your foot is much as possible, apply ice as needed. Follow-up with your doctor if symptoms or not gradually improving.

## 2023-09-18 ENCOUNTER — HOSPITAL ENCOUNTER (OUTPATIENT)
Age: 53
Discharge: HOME | End: 2023-09-18
Payer: COMMERCIAL

## 2023-09-18 LAB
AMORPH SED URNS QL MICRO: ABNORMAL
BACTERIA URNS QL MICRO: ABNORMAL
BILIRUB UR QL STRIP.AUTO: NEGATIVE
CASTS #/AREA URNS LPF: ABNORMAL /LPF
CHARACTER UR: CLEAR
COLOR UR AUTO: YELLOW
CRYSTALS VITF MICRO: ABNORMAL
EPI CELLS #/AREA URNS HPF: ABNORMAL /HPF
GLUCOSE UR QL STRIP.AUTO: 500 MG/DL
HGB UR STRIP.AUTO-MCNC: NEGATIVE MG/L
KETONES UR QL STRIP.AUTO: NEGATIVE
LEUKOCYTE ESTERASE UR QL STRIP.AUTO: NEGATIVE
MUCOUS THREADS URNS QL MICRO: ABNORMAL
NITRITE UR QL STRIP.AUTO: NEGATIVE
PH UR STRIP.AUTO: 5.5 [PH] (ref 5–9)
PROT UR STRIP.AUTO-MCNC: NEGATIVE MG/DL
RBC #/AREA URNS HPF: ABNORMAL /HPF
REFLEX TO URINE C & S: ABNORMAL
SP GR UR STRIP.AUTO: 1.02 (ref 1–1.03)
UROBILINOGEN UR STRIP-ACNC: 0.2 EU/DL (ref 0–1)
WBC # UR STRIP.AUTO: ABNORMAL /HPF

## 2023-09-18 PROCEDURE — 81001 URINALYSIS AUTO W/SCOPE: CPT

## 2023-10-05 ENCOUNTER — HOSPITAL ENCOUNTER (OUTPATIENT)
Age: 53
Discharge: HOME | End: 2023-10-05
Payer: COMMERCIAL

## 2023-10-05 LAB
ALBUMIN SERPL BCG-MCNC: 4.2 G/DL (ref 3.5–5.1)
ALP SERPL-CCNC: 67 U/L (ref 38–126)
ALT SERPL W/O P-5'-P-CCNC: 31 U/L (ref 11–66)
AST SERPL-CCNC: 35 U/L (ref 5–40)
BILIRUB CONJ SERPL-MCNC: 0.4 MG/DL (ref 0–0.3)
BILIRUB SERPL-MCNC: 1.5 MG/DL (ref 0.3–1.2)
PROT SERPL-MCNC: 7.1 G/DL (ref 6.1–8)

## 2023-10-05 PROCEDURE — 84443 ASSAY THYROID STIM HORMONE: CPT

## 2023-10-05 PROCEDURE — 36415 COLL VENOUS BLD VENIPUNCTURE: CPT

## 2023-10-05 PROCEDURE — 80076 HEPATIC FUNCTION PANEL: CPT

## 2023-10-05 PROCEDURE — 84439 ASSAY OF FREE THYROXINE: CPT

## 2023-10-05 PROCEDURE — 83516 IMMUNOASSAY NONANTIBODY: CPT

## 2023-10-06 LAB
T4 FREE SERPL-MCNC: 1.46 NG/DL (ref 0.93–1.76)
TSH SERPL DL<=0.005 MIU/L-ACNC: 4.38 UIU/ML (ref 0.4–4.2)

## 2023-10-10 LAB — TTG IGA SER IA-ACNC: 1 U/ML

## 2023-10-11 LAB — TTG IGG SER IA-ACNC: < 0.6 U/ML

## 2023-10-13 ENCOUNTER — HOSPITAL ENCOUNTER (EMERGENCY)
Age: 53
Discharge: HOME | End: 2023-10-13
Attending: FAMILY MEDICINE
Payer: COMMERCIAL

## 2023-10-13 ENCOUNTER — APPOINTMENT (OUTPATIENT)
Dept: GENERAL RADIOLOGY | Age: 53
End: 2023-10-13
Payer: COMMERCIAL

## 2023-10-13 VITALS
DIASTOLIC BLOOD PRESSURE: 77 MMHG | HEART RATE: 67 BPM | BODY MASS INDEX: 37.56 KG/M2 | SYSTOLIC BLOOD PRESSURE: 140 MMHG | RESPIRATION RATE: 18 BRPM | OXYGEN SATURATION: 98 % | TEMPERATURE: 97.9 F | HEIGHT: 64 IN | WEIGHT: 220 LBS

## 2023-10-13 DIAGNOSIS — S30.0XXA SACRAL CONTUSION, INITIAL ENCOUNTER: Primary | ICD-10-CM

## 2023-10-13 DIAGNOSIS — S46.911A RIGHT SHOULDER STRAIN, INITIAL ENCOUNTER: ICD-10-CM

## 2023-10-13 PROCEDURE — 6360000002 HC RX W HCPCS: Performed by: FAMILY MEDICINE

## 2023-10-13 PROCEDURE — 96372 THER/PROPH/DIAG INJ SC/IM: CPT

## 2023-10-13 PROCEDURE — 72220 X-RAY EXAM SACRUM TAILBONE: CPT

## 2023-10-13 PROCEDURE — 73030 X-RAY EXAM OF SHOULDER: CPT

## 2023-10-13 PROCEDURE — 99284 EMERGENCY DEPT VISIT MOD MDM: CPT

## 2023-10-13 RX ORDER — KETOROLAC TROMETHAMINE 30 MG/ML
30 INJECTION, SOLUTION INTRAMUSCULAR; INTRAVENOUS ONCE
Status: COMPLETED | OUTPATIENT
Start: 2023-10-13 | End: 2023-10-13

## 2023-10-13 RX ADMIN — KETOROLAC TROMETHAMINE 30 MG: 30 INJECTION, SOLUTION INTRAMUSCULAR; INTRAVENOUS at 22:50

## 2023-10-13 ASSESSMENT — PAIN - FUNCTIONAL ASSESSMENT
PAIN_FUNCTIONAL_ASSESSMENT: 0-10
PAIN_FUNCTIONAL_ASSESSMENT: 0-10

## 2023-10-13 ASSESSMENT — PAIN DESCRIPTION - LOCATION
LOCATION: BACK

## 2023-10-13 ASSESSMENT — PAIN DESCRIPTION - DESCRIPTORS
DESCRIPTORS: ACHING
DESCRIPTORS: ACHING

## 2023-10-13 ASSESSMENT — PAIN SCALES - GENERAL
PAINLEVEL_OUTOF10: 3
PAINLEVEL_OUTOF10: 8
PAINLEVEL_OUTOF10: 7
PAINLEVEL_OUTOF10: 3

## 2023-10-13 ASSESSMENT — PAIN DESCRIPTION - ORIENTATION: ORIENTATION: LOWER;MID

## 2023-10-13 ASSESSMENT — PAIN DESCRIPTION - PAIN TYPE: TYPE: ACUTE PAIN

## 2023-10-14 NOTE — ED NOTES
Pt stable and off to Radiology via wheelchair with [employer] tech. Pt states no concerns and vitals stable.         Richard Flanagan RN  10/13/23 [id number]

## 2023-10-14 NOTE — ED NOTES
Pt stable A&O x 3 given discharge and follow up info. Pt voiced no concerns and discharged from ER to self to home. Pt ambulated out of ER with no complications .        Patel Lanza RN  10/13/23 4013

## 2023-10-14 NOTE — DISCHARGE INSTRUCTIONS
WE DID NOT FIND ANY EVIDENCE THAT YOU BROKE YOUR SHOULDER OR TAILBONE. BOTH WERE EITHER BRUISED OR STRAINED. APPLY ICE TO THE INJURED BODY PARTS. TAKE TYLENOL EVERY 6 HOURS AS NEEDED FOR PAIN RELIEF. IT MAY TAKE 2-3 DAYS TO GET BACK TO NORMAL.
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
## 2023-12-27 ENCOUNTER — HOSPITAL ENCOUNTER (OUTPATIENT)
Age: 53
Discharge: HOME | End: 2023-12-27
Payer: COMMERCIAL

## 2023-12-27 LAB
ANION GAP SERPL CALC-SCNC: 13 MEQ/L (ref 8–16)
BUN SERPL-MCNC: 10 MG/DL (ref 7–22)
CALCIUM SERPL-MCNC: 9.2 MG/DL (ref 8.5–10.5)
CHLORIDE SERPL-SCNC: 102 MEQ/L (ref 98–111)
CO2 SERPL-SCNC: 25 MEQ/L (ref 23–33)
CREAT SERPL-MCNC: 0.6 MG/DL (ref 0.4–1.2)
GFR SERPL CREATININE-BSD FRML MDRD: > 60 ML/MIN/1.73M2
GLUCOSE SERPL-MCNC: 363 MG/DL (ref 70–108)
POTASSIUM SERPL-SCNC: 4.5 MEQ/L (ref 3.5–5.2)
SODIUM SERPL-SCNC: 140 MEQ/L (ref 135–145)
T4 FREE SERPL-MCNC: 1.13 NG/DL (ref 0.93–1.76)
TSH SERPL DL<=0.005 MIU/L-ACNC: 4.51 UIU/ML (ref 0.4–4.2)

## 2023-12-27 PROCEDURE — 80048 BASIC METABOLIC PNL TOTAL CA: CPT

## 2023-12-27 PROCEDURE — 83036 HEMOGLOBIN GLYCOSYLATED A1C: CPT

## 2023-12-27 PROCEDURE — 84439 ASSAY OF FREE THYROXINE: CPT

## 2023-12-27 PROCEDURE — 84443 ASSAY THYROID STIM HORMONE: CPT

## 2023-12-27 PROCEDURE — 36415 COLL VENOUS BLD VENIPUNCTURE: CPT

## 2023-12-28 LAB
DEPRECATED MEAN GLUCOSE BLD GHB EST-ACNC: 189 MG/DL (ref 70–126)
HBA1C MFR BLD HPLC: 8.3 % (ref 4.4–6.4)

## 2024-01-25 ENCOUNTER — HOSPITAL ENCOUNTER (OUTPATIENT)
Age: 54
Discharge: HOME | End: 2024-01-25
Payer: COMMERCIAL

## 2024-01-25 LAB
ALBUMIN SERPL BCG-MCNC: 4.1 G/DL (ref 3.5–5.1)
ALP SERPL-CCNC: 64 U/L (ref 38–126)
ALT SERPL W/O P-5'-P-CCNC: 21 U/L (ref 11–66)
AST SERPL-CCNC: 17 U/L (ref 5–40)
BILIRUB CONJ SERPL-MCNC: < 0.2 MG/DL (ref 0–0.3)
BILIRUB SERPL-MCNC: 0.8 MG/DL (ref 0.3–1.2)
PROT SERPL-MCNC: 7.3 G/DL (ref 6.1–8)

## 2024-01-25 PROCEDURE — 80076 HEPATIC FUNCTION PANEL: CPT

## 2024-01-25 PROCEDURE — 36415 COLL VENOUS BLD VENIPUNCTURE: CPT

## 2024-02-18 ENCOUNTER — APPOINTMENT (OUTPATIENT)
Dept: CT IMAGING | Age: 54
End: 2024-02-18
Payer: COMMERCIAL

## 2024-02-18 ENCOUNTER — HOSPITAL ENCOUNTER (EMERGENCY)
Age: 54
Discharge: HOME | End: 2024-02-18
Attending: FAMILY MEDICINE
Payer: COMMERCIAL

## 2024-02-18 ENCOUNTER — APPOINTMENT (OUTPATIENT)
Dept: GENERAL RADIOLOGY | Age: 54
End: 2024-02-18
Payer: COMMERCIAL

## 2024-02-18 VITALS
BODY MASS INDEX: 34.15 KG/M2 | RESPIRATION RATE: 17 BRPM | OXYGEN SATURATION: 97 % | DIASTOLIC BLOOD PRESSURE: 72 MMHG | WEIGHT: 200 LBS | TEMPERATURE: 98.3 F | HEART RATE: 79 BPM | SYSTOLIC BLOOD PRESSURE: 131 MMHG | HEIGHT: 64 IN
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
## 2024-02-18 DIAGNOSIS — S22.43XA CLOSED FRACTURE OF MULTIPLE RIBS OF BOTH SIDES, INITIAL ENCOUNTER: ICD-10-CM

## 2024-02-18 LAB
ALBUMIN SERPL BCP-MCNC: 3.4 GM/DL (ref 3.4–5)
ALP SERPL-CCNC: 63 U/L (ref 46–116)
ALT SERPL W P-5'-P-CCNC: 25 U/L (ref 14–63)
ANION GAP SERPL CALC-SCNC: 13 MEQ/L (ref 8–16)
AST SERPL W P-5'-P-CCNC: 17 U/L (ref 15–37)
BASOPHILS # BLD: 0.2 % (ref 0–3)
BASOPHILS ABSOLUTE: 0 THOU/MM3 (ref 0–0.1)
BILIRUB SERPL-MCNC: 0.8 MG/DL (ref 0.2–1)
BUN SERPL-MCNC: 10 MG/DL (ref 7–18)
CALCIUM SERPL-MCNC: 8.7 MG/DL (ref 8.5–10.1)
CHLORIDE SERPL-SCNC: 104 MEQ/L (ref 98–107)
CO2 SERPL-SCNC: 24 MEQ/L (ref 21–32)
CREAT SERPL-MCNC: 0.9 MG/DL (ref 0.6–1.3)
EOSINOPHILS ABSOLUTE: 0 THOU/MM3 (ref 0–0.5)
EOSINOPHILS RELATIVE PERCENT: 0.8 % (ref 0–4)
GFR SERPL CREATININE-BSD FRML MDRD: > 60 ML/MIN/1.73M2
GLUCOSE SERPL-MCNC: 386 MG/DL (ref 74–106)
HCT VFR BLD CALC: 41.7 % (ref 37–47)
HEMOGLOBIN: 14.1 GM/DL (ref 12–16)
IMMATURE GRANS (ABS): 0 THOU/MM3 (ref 0–0.07)
IMMATURE GRANULOCYTES: 0 %
LYMPHOCYTES # BLD AUTO: 18.1 % (ref 15–47)
LYMPHOCYTES ABSOLUTE: 1.1 THOU/MM3 (ref 1–4.8)
MCH RBC QN AUTO: 30.7 PG (ref 26–32)
MCHC RBC AUTO-ENTMCNC: 33.8 GM/DL (ref 31–35)
MCV RBC AUTO: 90.7 FL (ref 81–99)
MONOCYTES: 0.3 THOU/MM3 (ref 0.3–1.3)
MONOCYTES: 5.7 % (ref 0–12)
PDW BLD-RTO: 12.8 % (ref 11.5–14.9)
PLATELET # BLD AUTO: 186 THOU/MM3 (ref 130–400)
PMV BLD AUTO: 8.8 FL (ref 9.4–12.4)
POTASSIUM SERPL-SCNC: 4.2 MEQ/L (ref 3.5–5.1)
PROT SERPL-MCNC: 6.9 GM/DL (ref 6.4–8.2)
RBC # BLD: 4.6 MILL/MM3 (ref 4.1–5.3)
SEG NEUTROPHILS: 74.9 % (ref 43–75)
SEGMENTED NEUTROPHILS ABSOLUTE COUNT: 4.5 THOU/MM3 (ref 1.8–7.7)
SODIUM SERPL-SCNC: 141 MEQ/L (ref 136–145)
WBC # BLD: 6 THOU/MM3 (ref 4.8–10.8)

## 2024-02-18 PROCEDURE — 70450 CT HEAD/BRAIN W/O DYE: CPT

## 2024-02-18 PROCEDURE — 80053 COMPREHEN METABOLIC PANEL: CPT

## 2024-02-18 PROCEDURE — 2580000003 HC RX 258: Performed by: FAMILY MEDICINE

## 2024-02-18 PROCEDURE — 99285 EMERGENCY DEPT VISIT HI MDM: CPT

## 2024-02-18 PROCEDURE — 74177 CT ABD & PELVIS W/CONTRAST: CPT

## 2024-02-18 PROCEDURE — 96374 THER/PROPH/DIAG INJ IV PUSH: CPT

## 2024-02-18 PROCEDURE — 71046 X-RAY EXAM CHEST 2 VIEWS: CPT

## 2024-02-18 PROCEDURE — 6360000002 HC RX W HCPCS: Performed by: FAMILY MEDICINE

## 2024-02-18 PROCEDURE — 6360000004 HC RX CONTRAST MEDICATION: Performed by: FAMILY MEDICINE

## 2024-02-18 PROCEDURE — 85025 COMPLETE CBC W/AUTO DIFF WBC: CPT

## 2024-02-18 RX ORDER — HYDROCODONE BITARTRATE AND ACETAMINOPHEN 5; 325 MG/1; MG/1
1 TABLET ORAL EVERY 6 HOURS PRN
Qty: 10 TABLET | Refills: 0 | Status: SHIPPED | OUTPATIENT
Start: 2024-02-18 | End: 2024-02-21

## 2024-02-18 RX ORDER — 0.9 % SODIUM CHLORIDE 0.9 %
1000 INTRAVENOUS SOLUTION INTRAVENOUS ONCE
Status: COMPLETED | OUTPATIENT
Start: 2024-02-18 | End: 2024-02-18

## 2024-02-18 RX ORDER — DICLOFENAC SODIUM 75 MG/1
TABLET, DELAYED RELEASE ORAL
COMMUNITY
Start: 2024-01-29

## 2024-02-18 RX ORDER — LORAZEPAM 2 MG/ML
0.5 INJECTION INTRAMUSCULAR ONCE
Status: COMPLETED | OUTPATIENT
Start: 2024-02-18 | End: 2024-02-18

## 2024-02-18 RX ADMIN — SODIUM CHLORIDE 1000 ML: 9 INJECTION, SOLUTION INTRAVENOUS at 11:31

## 2024-02-18 RX ADMIN — LORAZEPAM 0.5 MG: 2 INJECTION INTRAMUSCULAR; INTRAVENOUS at 11:33

## 2024-02-18 RX ADMIN — IOPAMIDOL 100 ML: 755 INJECTION, SOLUTION INTRAVENOUS at 12:11

## 2024-02-18 ASSESSMENT — PAIN - FUNCTIONAL ASSESSMENT: PAIN_FUNCTIONAL_ASSESSMENT: 0-10

## 2024-02-18 ASSESSMENT — PAIN SCALES - GENERAL
PAINLEVEL_OUTOF10: 5
PAINLEVEL_OUTOF10: 5

## 2024-02-18 ASSESSMENT — PAIN DESCRIPTION - DESCRIPTORS: DESCRIPTORS: THROBBING

## 2024-02-18 ASSESSMENT — PAIN DESCRIPTION - LOCATION: LOCATION: ABDOMEN

## 2024-02-18 ASSESSMENT — PAIN DESCRIPTION - PAIN TYPE: TYPE: ACUTE PAIN

## 2024-02-18 ASSESSMENT — PAIN DESCRIPTION - ORIENTATION: ORIENTATION: MID
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
## 2024-02-18 NOTE — ED NOTES
Discharge teaching and instructions for condition explained to patient.  AVS reviewed.  Patient voiced understanding regarding prescriptions, follow up appointments and care of self at home.  Pt discharged to home in stable condition per self with family.
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
## 2024-02-18 NOTE — DISCHARGE INSTRUCTIONS
Norco as directed as needed. Pillow splinting. Incentive spirometer for home use. Follow up with PCP.

## 2024-02-22 ENCOUNTER — HOSPITAL ENCOUNTER (OUTPATIENT)
Age: 54
Setting detail: SPECIMEN
Discharge: HOME | End: 2024-02-22
Payer: COMMERCIAL

## 2024-02-22 LAB
C CAYETANENSIS DNA SPEC QL NAA+PROBE: NOT DETECTED
CAMPY SP DNA.DIARRHEA STL QL NAA+PROBE: NOT DETECTED
CRYPTOSP DNA SPEC QL NAA+PROBE: NOT DETECTED
E COLI O157H7 DNA SPEC QL NAA+PROBE: NORMAL
E HISTOLYT DNA SPEC QL NAA+PROBE: NOT DETECTED
EAEC PAA PLAS AGGR+AATA ST NAA+NON-PRB: NOT DETECTED
EC STX1+STX2 + H7 FLIC SPEC NAA+PROBE: NOT DETECTED
EPEC EAE GENE STL QL NAA+NON-PROBE: NOT DETECTED
ETEC LTA+ST1A+ST1B TOX ST NAA+NON-PROBE: NOT DETECTED
G LAMBLIA DNA SPEC QL NAA+PROBE: NOT DETECTED
HADV DNA SPEC QL NAA+PROBE: NOT DETECTED
HASTV RNA SPEC QL NAA+PROBE: NOT DETECTED
NOROVIRUS GI + GII RNA STL NAA+PROBE: NOT DETECTED
P SHIGELLOIDES DNA STL QL NAA+PROBE: NOT DETECTED
REASON FOR REJECTION: NORMAL
REJECTED TEST: NORMAL
RV RNA SPEC QL NAA+PROBE: NOT DETECTED
SALMONELLA DNA SPEC QL NAA+PROBE: NOT DETECTED
SAPOVIRUS RNA SPEC QL NAA+PROBE: NOT DETECTED
SHIGELLA SP+EIEC IPAH ST NAA+NON-PROBE: NOT DETECTED
V CHOLERAE DNA SPEC QL NAA+PROBE: NOT DETECTED
VIBRIO DNA SPEC NAA+PROBE: NOT DETECTED
Y ENTERO RECN STL QL NAA+PROBE: NOT DETECTED

## 2024-02-22 PROCEDURE — 87507 IADNA-DNA/RNA PROBE TQ 12-25: CPT

## 2024-03-27 ENCOUNTER — OFFICE VISIT (OUTPATIENT)
Dept: PULMONOLOGY | Age: 54
End: 2024-03-27
Payer: COMMERCIAL

## 2024-03-27 VITALS
TEMPERATURE: 98 F | HEIGHT: 64 IN | DIASTOLIC BLOOD PRESSURE: 70 MMHG | BODY MASS INDEX: 34.49 KG/M2 | HEART RATE: 66 BPM | SYSTOLIC BLOOD PRESSURE: 128 MMHG | OXYGEN SATURATION: 98 % | WEIGHT: 202 LBS

## 2024-03-27 DIAGNOSIS — G47.10 HYPERSOMNIA: ICD-10-CM

## 2024-03-27 DIAGNOSIS — G35 MULTIPLE SCLEROSIS (HCC): ICD-10-CM

## 2024-03-27 DIAGNOSIS — G47.33 OSA ON CPAP: Primary | ICD-10-CM

## 2024-03-27 PROCEDURE — 99214 OFFICE O/P EST MOD 30 MIN: CPT | Performed by: INTERNAL MEDICINE

## 2024-03-27 PROCEDURE — G8417 CALC BMI ABV UP PARAM F/U: HCPCS | Performed by: INTERNAL MEDICINE

## 2024-03-27 PROCEDURE — 1036F TOBACCO NON-USER: CPT | Performed by: INTERNAL MEDICINE

## 2024-03-27 PROCEDURE — 3017F COLORECTAL CA SCREEN DOC REV: CPT | Performed by: INTERNAL MEDICINE

## 2024-03-27 PROCEDURE — G8427 DOCREV CUR MEDS BY ELIG CLIN: HCPCS | Performed by: INTERNAL MEDICINE

## 2024-03-27 PROCEDURE — G8484 FLU IMMUNIZE NO ADMIN: HCPCS | Performed by: INTERNAL MEDICINE

## 2024-03-27 RX ORDER — GLIMEPIRIDE 2 MG/1
2 TABLET ORAL
COMMUNITY

## 2024-03-27 RX ORDER — TIZANIDINE 2 MG/1
2 TABLET ORAL EVERY 6 HOURS PRN
COMMUNITY

## 2024-03-27 NOTE — PATIENT INSTRUCTIONS
Recommendations/Plan:   -Due to loss of 41 pounds of weight from her initial baseline sleep study performed in August 2021, she was advised to go for a repeat baseline sleep study to check the current status of sleep apnea to continue PAP therapy.  However, patient did not want to go for baseline study at this time.  She want to continue with PAP therapy as prescribed above.  -She was advised to submit her down load report from [facility] for next 1month starting from today to document her > 4hour compliance. She was informed about the possibility of loosing her CPAP if she don't use it with good compliance for >4hours i.e >70%. She verbalizes understanding.  -Patient will be given a prescription for chin strap to use with her current mask to avoid leaks and to improve her dryness of mouth.  -She was advised to continue current positive airway pressure therapy with above described pressure.  -She was advised to keep good compliance with current recommended pressure to get optimal results and clinical improvement.  -She is aware of the consequences of poor compliance to her recommended positive air way pressure therapy including increased risk for cardiovascular and cerebrovascular events and morbidity including death.  -Follow with my clinic in 6months for clinical reevaluation with review of download.  -She was advised to call [employer] regarding supplies if needed.  -She was advised to continue to practice good sleep hygiene practices.  -She was advised to loose weight by controlling diet and doing exercise.  -She was advised to call my office for earlier appointment if needed for worsening of sleep symptoms.  -Courtney Torres was educated about my impression and plan. Patient verbalizes understanding.

## 2024-03-27 NOTE — PROGRESS NOTES
Chief Complaint: 6 month f/u with download srhme    Mallampati airway Class:3  Neck Circumference:.19 Inches    [place] sleepiness score 3/27/24:   Sleep apnea quality of life questionnaire:      Diagnostic Data:   PAP Download:   Recorded compliance dates:2/25/24-3/25/24  More than 4hour usage compliance was:27%.  Average residual Apnea- Hypoapnea index on current pressue was:2.2.      PAP Type cpap   Level  99plh99     Average usuage hours per day was:.4hours     Interface: full    Provider:  [x]-TEODORA  []Ladonna  []Génesis  []Bong         [][facility] []Other:   
Temp 98 °F (36.7 °C)   Ht 1.626 m (5' 4\")   Wt 91.6 kg (202 lb)   LMP 2015   SpO2 98% Comment: room air at rest  BMI 34.67 kg/m²   BMI:  Body mass index is 34.67 kg/m².     Mallampati airway Class:3  Neck Circumference:.19 Inches  [place] sleepiness score 3/27/24: 7  Sleep apnea quality of life questionnaire:87    Physical Exam :  Constitutional: Patient appears moderately built and moderately nourished. No distress. Patient is oriented to person, place, and time.  HENT:   Head: Normocephalic and atraumatic.   Right Ear: External ear normal.   Left Ear: External ear normal.   Mouth/Throat: Oropharynx is clear and moist.   Eyes: Conjunctivae are normal. Pupils are equal and reactive to light. No scleral icterus.   Neck: Neck supple. No JVD present.   Cardiovascular: Normal rate, regular rhythm, normal heart sounds. No murmur heard.   Pulmonary/Chest: Effort normal and breath sounds normal. No stridor. No respiratory distress.  No wheezes. No rales.   Abdominal: Soft. Patient exhibits no distension. No tenderness.   Musculoskeletal: Normal range of motion.   Extremities:Patient exhibits no edema and no tenderness.   Lymphadenopathy:  No cervical adenopathy.   Neurological: Patient is alert and oriented to person, place, and time.   Skin: Skin is warm and dry. Patient is not diaphoretic.   Psychiatric: Patient  has a normal mood and affect.    Diagnostic Data:            SLEEP STUDY REPORT     PATIENT NAME: GERALDINE TEJEDA                  :        1970  MED REC NO:   [id number]                           ROOM:  ACCOUNT NO:   [id number]                           ADMIT DATE: 2021  PROVIDER:     Chris Nix MD     DATE OF STUDY:  2021     CPAP TITRATION STUDY REPORT     REFERRING PROVIDER:  Maylin King MD  IMPRESSION:  1.  Mild obstructive sleep apnea with worsening of respiratory events  during REM sleep.  The patient had optimal titration to a CPAP pressure  of 11 cm

## 2024-05-10 ENCOUNTER — HOSPITAL ENCOUNTER (OUTPATIENT)
Age: 54
Discharge: HOME | End: 2024-05-10
Payer: COMMERCIAL

## 2024-05-10 LAB
T4 FREE SERPL-MCNC: 1.81 NG/DL (ref 0.93–1.68)
TSH SERPL DL<=0.005 MIU/L-ACNC: 0.03 UIU/ML (ref 0.4–4.2)

## 2024-05-10 PROCEDURE — 84443 ASSAY THYROID STIM HORMONE: CPT

## 2024-05-10 PROCEDURE — 36415 COLL VENOUS BLD VENIPUNCTURE: CPT

## 2024-05-10 PROCEDURE — 84439 ASSAY OF FREE THYROXINE: CPT

## 2024-06-07 ENCOUNTER — OFFICE VISIT (OUTPATIENT)
Dept: NEUROLOGY | Age: 54
End: 2024-06-07
Payer: COMMERCIAL

## 2024-06-07 VITALS
HEIGHT: 64 IN | BODY MASS INDEX: 34.15 KG/M2 | WEIGHT: 200 LBS | OXYGEN SATURATION: 100 % | DIASTOLIC BLOOD PRESSURE: 82 MMHG | HEART RATE: 60 BPM | SYSTOLIC BLOOD PRESSURE: 130 MMHG

## 2024-06-07 DIAGNOSIS — G35 MULTIPLE SCLEROSIS (HCC): Primary | ICD-10-CM

## 2024-06-07 DIAGNOSIS — R29.898 RIGHT LEG WEAKNESS: ICD-10-CM

## 2024-06-07 PROCEDURE — 3017F COLORECTAL CA SCREEN DOC REV: CPT | Performed by: NURSE PRACTITIONER

## 2024-06-07 PROCEDURE — G8417 CALC BMI ABV UP PARAM F/U: HCPCS | Performed by: NURSE PRACTITIONER

## 2024-06-07 PROCEDURE — 99213 OFFICE O/P EST LOW 20 MIN: CPT | Performed by: NURSE PRACTITIONER

## 2024-06-07 PROCEDURE — G8427 DOCREV CUR MEDS BY ELIG CLIN: HCPCS | Performed by: NURSE PRACTITIONER

## 2024-06-07 PROCEDURE — 1036F TOBACCO NON-USER: CPT | Performed by: NURSE PRACTITIONER

## 2024-06-07 NOTE — PATIENT INSTRUCTIONS
Continue wearing your CPAP nightly.  Continue with Primrose oil, vitamin D and calcium.  Report any new symptoms or concerns.   Follow up in 12 months or sooner if needed  Call if any questions or concerns or new symptoms

## 2024-07-01 ENCOUNTER — HOSPITAL ENCOUNTER (OUTPATIENT)
Age: 54
Discharge: HOME | End: 2024-07-01
Payer: COMMERCIAL

## 2024-07-01 LAB — TSH SERPL DL<=0.005 MIU/L-ACNC: 0.19 UIU/ML (ref 0.4–4.2)

## 2024-07-01 PROCEDURE — 84439 ASSAY OF FREE THYROXINE: CPT

## 2024-07-01 PROCEDURE — 36415 COLL VENOUS BLD VENIPUNCTURE: CPT

## 2024-07-01 PROCEDURE — 84443 ASSAY THYROID STIM HORMONE: CPT

## 2024-07-02 LAB — T4 FREE SERPL-MCNC: 1.26 NG/DL (ref 0.93–1.68)

## 2024-07-24 NOTE — TELEPHONE ENCOUNTER
Patient notified and verbalized understanding. [No Acute Distress] : no acute distress [Well Nourished] : well nourished [Well Developed] : well developed [Well-Appearing] : well-appearing [Normal Sclera/Conjunctiva] : normal sclera/conjunctiva [PERRL] : pupils equal round and reactive to light [EOMI] : extraocular movements intact [Normal Outer Ear/Nose] : the outer ears and nose were normal in appearance [Normal Oropharynx] : the oropharynx was normal [No JVD] : no jugular venous distention [No Lymphadenopathy] : no lymphadenopathy [Supple] : supple [Thyroid Normal, No Nodules] : the thyroid was normal and there were no nodules present [No Respiratory Distress] : no respiratory distress  [No Accessory Muscle Use] : no accessory muscle use [Clear to Auscultation] : lungs were clear to auscultation bilaterally [Normal Rate] : normal rate  [Regular Rhythm] : with a regular rhythm [Normal S1, S2] : normal S1 and S2 [No Murmur] : no murmur heard [No Carotid Bruits] : no carotid bruits [No Abdominal Bruit] : a ~M bruit was not heard ~T in the abdomen [No Varicosities] : no varicosities [Pedal Pulses Present] : the pedal pulses are present [No Edema] : there was no peripheral edema [No Palpable Aorta] : no palpable aorta [No Extremity Clubbing/Cyanosis] : no extremity clubbing/cyanosis [Soft] : abdomen soft [Non Tender] : non-tender [Non-distended] : non-distended [No Masses] : no abdominal mass palpated [No HSM] : no HSM [Normal Bowel Sounds] : normal bowel sounds [Normal Posterior Cervical Nodes] : no posterior cervical lymphadenopathy [Normal Anterior Cervical Nodes] : no anterior cervical lymphadenopathy [No CVA Tenderness] : no CVA  tenderness [No Spinal Tenderness] : no spinal tenderness [No Joint Swelling] : no joint swelling [Grossly Normal Strength/Tone] : grossly normal strength/tone [No Rash] : no rash [Coordination Grossly Intact] : coordination grossly intact [No Focal Deficits] : no focal deficits [Normal Gait] : normal gait [Deep Tendon Reflexes (DTR)] : deep tendon reflexes were 2+ and symmetric [Normal Affect] : the affect was normal [Normal Insight/Judgement] : insight and judgment were intact [de-identified] : right ear cerumen impaction, left TM WNL [de-identified] : right lateral hip and IT band area TTP

## 2024-08-16 ENCOUNTER — HOSPITAL ENCOUNTER (OUTPATIENT)
Age: 54
Discharge: HOME | End: 2024-08-16
Payer: COMMERCIAL

## 2024-08-16 LAB
AMORPH SED URNS QL MICRO: ABNORMAL
BACTERIA URNS QL MICRO: ABNORMAL
BILIRUB UR QL STRIP.AUTO: NEGATIVE
CASTS #/AREA URNS LPF: ABNORMAL /LPF
CHARACTER UR: CLEAR
COLOR UR AUTO: YELLOW
CRYSTALS VITF MICRO: ABNORMAL
EPI CELLS #/AREA URNS HPF: ABNORMAL /HPF
GLUCOSE UR QL STRIP.AUTO: 500 MG/DL
HGB UR STRIP.AUTO-MCNC: NEGATIVE MG/L
KETONES UR QL STRIP.AUTO: NEGATIVE
LEUKOCYTE ESTERASE UR QL STRIP.AUTO: NEGATIVE
MUCOUS THREADS URNS QL MICRO: ABNORMAL
NITRITE UR QL STRIP.AUTO: NEGATIVE
PH UR STRIP.AUTO: 7 [PH] (ref 5–9)
PROT UR STRIP.AUTO-MCNC: NEGATIVE MG/DL
RBC #/AREA URNS HPF: ABNORMAL /HPF
REFLEX TO URINE C & S: ABNORMAL
SP GR UR STRIP.AUTO: 1.01 (ref 1–1.03)
UROBILINOGEN UR STRIP-ACNC: 0.2 EU/DL (ref 0–1)
WBC # UR STRIP.AUTO: ABNORMAL /HPF

## 2024-08-16 PROCEDURE — 81001 URINALYSIS AUTO W/SCOPE: CPT

## 2024-08-28 ENCOUNTER — HOSPITAL ENCOUNTER (OUTPATIENT)
Age: 54
Discharge: HOME | End: 2024-08-28
Payer: COMMERCIAL

## 2024-08-28 LAB — TSH SERPL DL<=0.005 MIU/L-ACNC: 0.52 UIU/ML (ref 0.4–4.2)

## 2024-08-28 PROCEDURE — 84443 ASSAY THYROID STIM HORMONE: CPT

## 2024-08-28 PROCEDURE — 36415 COLL VENOUS BLD VENIPUNCTURE: CPT

## 2024-09-03 NOTE — PROGRESS NOTES
[facility]  Sleep Medicine Clinic Follow up note    Courtney Torres         Chief Complaint: Courtney is a 2 month f/u with download                                         Chief complaint and [place]: Courtney Torres is a 54 y.o.oldfemale came for follow up regarding her sleep apnea. She used to be on treatment with a positive airway pressure device with a CPAP pressure of 11cm H20.  She last 28 pounds of weight from her old sleep study performed on 2021.  Her symptoms improved.  She quit using her CPAP machine for the last 3 weeks.  She feels that she do not need CPAP machine therapy anymore.  She is sleeping well at night with out difficulty.She denies any daytime sleepiness.     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      Review of Systems:   General/Constitutional: she lost 28lbs of weight from the last visit. No fever or chills.  HENT: Negative.   Eyes: Negative.  Upper respiratory tract: No nasal stuffiness or post nasal drip.  Lower respiratory tract/ lungs: No cough or sputum production.  Cardiovascular: No palpitations or chest pain.  Gastrointestinal: No nausea or vomiting.  Neurological: No focal neurologiacal weakness.  Extremities: No edema.  Musculoskeletal: No complaints.  Genitourinary: No complaints.  Hematological: Negative.   Psychiatric/Behavioral: Negative.   Skin: No itching.      Past Medical History:   Diagnosis Date    Depression     Diabetes mellitus (HCC)     patient states prediabetes    Eye disease     thyroid    Hypertension     Hypothyroidism     Multiple sclerosis (HCC)     Sleep apnea     Ulcer of abdomen wall (HCC)        Past Surgical History:   Procedure Laterality Date    BREAST SURGERY  2012    biopsy     SECTION      COLONOSCOPY      last one     DILATION AND CURETTAGE OF UTERUS  7/15/2014    ENDOMETRIAL ABLATION  7/15/2014    EYE SURGERY  2003??    lasik, bilateral

## 2024-09-09 ENCOUNTER — HOSPITAL ENCOUNTER (OUTPATIENT)
Dept: GENERAL RADIOLOGY | Age: 54
Discharge: HOME | End: 2024-09-09
Payer: COMMERCIAL

## 2024-09-09 ENCOUNTER — HOSPITAL ENCOUNTER (OUTPATIENT)
Age: 54
Discharge: HOME | End: 2024-09-09
Payer: COMMERCIAL

## 2024-09-09 DIAGNOSIS — R07.81 RIB PAIN ON LEFT SIDE: ICD-10-CM

## 2024-09-09 PROCEDURE — 71101 X-RAY EXAM UNILAT RIBS/CHEST: CPT

## 2024-09-29 ENCOUNTER — HOSPITAL ENCOUNTER (OUTPATIENT)
Age: 54
Discharge: HOME | End: 2024-09-29
Payer: COMMERCIAL

## 2024-09-29 LAB
ALBUMIN SERPL BCG-MCNC: 4.1 G/DL (ref 3.5–5.1)
ALP SERPL-CCNC: 70 U/L (ref 38–126)
ALT SERPL W/O P-5'-P-CCNC: 15 U/L (ref 11–66)
ANION GAP SERPL CALC-SCNC: 10 MEQ/L (ref 8–16)
AST SERPL-CCNC: 18 U/L (ref 5–40)
BILIRUB SERPL-MCNC: 0.7 MG/DL (ref 0.3–1.2)
BUN SERPL-MCNC: 12 MG/DL (ref 7–22)
CALCIUM SERPL-MCNC: 9.2 MG/DL (ref 8.5–10.5)
CHLORIDE SERPL-SCNC: 104 MEQ/L (ref 98–111)
CHOLEST SERPL-MCNC: 162 MG/DL (ref 100–199)
CO2 SERPL-SCNC: 27 MEQ/L (ref 23–33)
CREAT SERPL-MCNC: 0.7 MG/DL (ref 0.4–1.2)
DEPRECATED MEAN GLUCOSE BLD GHB EST-ACNC: 114 MG/DL (ref 70–126)
GFR SERPL CREATININE-BSD FRML MDRD: > 90 ML/MIN/1.73M2
GLUCOSE SERPL-MCNC: 79 MG/DL (ref 70–108)
HBA1C MFR BLD HPLC: 5.8 % (ref 4.4–6.4)
HDLC SERPL-MCNC: 99 MG/DL
LDLC SERPL CALC-MCNC: 49 MG/DL
POTASSIUM SERPL-SCNC: 4.8 MEQ/L (ref 3.5–5.2)
PROT SERPL-MCNC: 7 G/DL (ref 6.1–8)
SODIUM SERPL-SCNC: 141 MEQ/L (ref 135–145)
TRIGL SERPL-MCNC: 70 MG/DL (ref 0–199)
TSH SERPL DL<=0.005 MIU/L-ACNC: 0.86 UIU/ML (ref 0.4–4.2)

## 2024-09-29 PROCEDURE — 36415 COLL VENOUS BLD VENIPUNCTURE: CPT

## 2024-09-29 PROCEDURE — 84443 ASSAY THYROID STIM HORMONE: CPT

## 2024-09-29 PROCEDURE — 83036 HEMOGLOBIN GLYCOSYLATED A1C: CPT

## 2024-09-29 PROCEDURE — 80061 LIPID PANEL: CPT

## 2024-09-29 PROCEDURE — 80053 COMPREHEN METABOLIC PANEL: CPT

## 2024-10-01 NOTE — PROGRESS NOTES
Chief Complaint:  6mo REJI f/u w/download    Mallampati airway Class:III  Neck Circumference:15.5 Inches    [place] sleepiness score 10/1/24: 3.  SAQLI:  97    Diagnostic Data:   PAP Download:   Recorded compliance dates:  7/2/24-9/29/24  More than 4hour usage compliance was:3%.  Average residual Apnea- Hypoapnea index on current pressue was:1.4.      PAP Type CPAP   Level  11 cmH2O     Average usuage hours per day was:5.14     Interface: FFM    Provider:  [x][facility]  []Ladonna  []Génesis  []Bong         [][facility] []Other:

## 2024-10-02 ENCOUNTER — OFFICE VISIT (OUTPATIENT)
Dept: PULMONOLOGY | Age: 54
End: 2024-10-02
Payer: COMMERCIAL

## 2024-10-02 VITALS
OXYGEN SATURATION: 97 % | SYSTOLIC BLOOD PRESSURE: 134 MMHG | HEIGHT: 64 IN | DIASTOLIC BLOOD PRESSURE: 82 MMHG | WEIGHT: 215.2 LBS | BODY MASS INDEX: 36.74 KG/M2 | TEMPERATURE: 98 F | HEART RATE: 59 BPM

## 2024-10-02 DIAGNOSIS — I10 ESSENTIAL HYPERTENSION: ICD-10-CM

## 2024-10-02 DIAGNOSIS — G47.33 OSA (OBSTRUCTIVE SLEEP APNEA): Primary | ICD-10-CM

## 2024-10-02 DIAGNOSIS — G35 MULTIPLE SCLEROSIS (HCC): ICD-10-CM

## 2024-10-02 PROCEDURE — 99213 OFFICE O/P EST LOW 20 MIN: CPT | Performed by: INTERNAL MEDICINE

## 2024-10-02 PROCEDURE — 3079F DIAST BP 80-89 MM HG: CPT | Performed by: INTERNAL MEDICINE

## 2024-10-02 PROCEDURE — 3017F COLORECTAL CA SCREEN DOC REV: CPT | Performed by: INTERNAL MEDICINE

## 2024-10-02 PROCEDURE — G8417 CALC BMI ABV UP PARAM F/U: HCPCS | Performed by: INTERNAL MEDICINE

## 2024-10-02 PROCEDURE — 3075F SYST BP GE 130 - 139MM HG: CPT | Performed by: INTERNAL MEDICINE

## 2024-10-02 PROCEDURE — G8484 FLU IMMUNIZE NO ADMIN: HCPCS | Performed by: INTERNAL MEDICINE

## 2024-10-02 PROCEDURE — 1036F TOBACCO NON-USER: CPT | Performed by: INTERNAL MEDICINE

## 2024-10-02 PROCEDURE — G8427 DOCREV CUR MEDS BY ELIG CLIN: HCPCS | Performed by: INTERNAL MEDICINE

## 2024-10-02 NOTE — PATIENT INSTRUCTIONS
Recommendations/Plan:   -Due to loss of 28 pounds of weight from her old sleep study performed on 6 August 2021, will schedule patient for baseline sleep study in the sleep lab to check the current status of sleep apnea.  She would like to go back on CPAP therapy if she still found to have sleep apnea with associated symptoms including hypersomnia.  -Schedule patient for nocturnal polysomnogram (Sleep study) at [facility] sleep lab. Patient to follow with my clinic at [facility] slee clinic 1week after sleep study.  -She was advised to continue current positive airway pressure therapy with above described pressure until her baseline sleep study results were available.  -She was advised to keep good compliance with current recommended pressure to get optimal results and clinical improvement.  -She is aware of the consequences of poor compliance to her recommended positive air way pressure therapy including increased risk for cardiovascular and cerebrovascular events and morbidity including death.  -She was advised to call [facility] regarding supplies if needed.  -She was advised to continue to practice good sleep hygiene practices.  -She was advised to loose weight by controlling diet and doing exercise.  -She was advised to call my office for earlier appointment if needed for worsening of sleep symptoms.  -Courtney Torres was educated about my impression and plan. Patient verbalizes understanding.

## 2024-10-09 ENCOUNTER — HOSPITAL ENCOUNTER (OUTPATIENT)
Dept: SLEEP CENTER | Age: 54
Discharge: HOME | End: 2024-10-11
Attending: INTERNAL MEDICINE
Payer: COMMERCIAL

## 2024-10-09 DIAGNOSIS — G35 MULTIPLE SCLEROSIS (HCC): ICD-10-CM

## 2024-10-09 DIAGNOSIS — I10 ESSENTIAL HYPERTENSION: ICD-10-CM

## 2024-10-09 DIAGNOSIS — G47.33 OSA (OBSTRUCTIVE SLEEP APNEA): ICD-10-CM

## 2024-10-09 PROCEDURE — 95810 POLYSOM 6/> YRS 4/> PARAM: CPT

## 2024-10-21 ENCOUNTER — HOSPITAL ENCOUNTER (EMERGENCY)
Age: 54
Discharge: HOME | End: 2024-10-21
Attending: EMERGENCY MEDICINE
Payer: COMMERCIAL

## 2024-10-21 ENCOUNTER — APPOINTMENT (OUTPATIENT)
Dept: GENERAL RADIOLOGY | Age: 54
End: 2024-10-21
Payer: COMMERCIAL

## 2024-10-21 VITALS
OXYGEN SATURATION: 98 % | RESPIRATION RATE: 14 BRPM | DIASTOLIC BLOOD PRESSURE: 73 MMHG | WEIGHT: 215 LBS | HEIGHT: 62 IN | BODY MASS INDEX: 39.56 KG/M2 | SYSTOLIC BLOOD PRESSURE: 135 MMHG | HEART RATE: 61 BPM | TEMPERATURE: 98.1 F

## 2024-10-21 DIAGNOSIS — I10 PRIMARY HYPERTENSION: Primary | ICD-10-CM

## 2024-10-21 LAB
ANION GAP SERPL CALC-SCNC: 13 MEQ/L (ref 8–16)
BASOPHILS ABSOLUTE: 0.1 THOU/MM3 (ref 0–0.1)
BASOPHILS NFR BLD AUTO: 0.9 %
BUN SERPL-MCNC: 16 MG/DL (ref 7–22)
CALCIUM SERPL-MCNC: 9.3 MG/DL (ref 8.5–10.5)
CHLORIDE SERPL-SCNC: 101 MEQ/L (ref 98–111)
CO2 SERPL-SCNC: 26 MEQ/L (ref 23–33)
CREAT SERPL-MCNC: 0.8 MG/DL (ref 0.4–1.2)
DEPRECATED RDW RBC AUTO: 42.3 FL (ref 35–45)
EKG ATRIAL RATE: 59 BPM
EKG P AXIS: 45 DEGREES
EKG P-R INTERVAL: 152 MS
EKG Q-T INTERVAL: 424 MS
EKG QRS DURATION: 104 MS
EKG QTC CALCULATION (BAZETT): 419 MS
EKG R AXIS: 15 DEGREES
EKG T AXIS: 33 DEGREES
EKG VENTRICULAR RATE: 59 BPM
EOSINOPHIL NFR BLD AUTO: 2.4 %
EOSINOPHILS ABSOLUTE: 0.2 THOU/MM3 (ref 0–0.4)
ERYTHROCYTE [DISTWIDTH] IN BLOOD BY AUTOMATED COUNT: 13.1 % (ref 11.5–14.5)
GFR SERPL CREATININE-BSD FRML MDRD: 86 ML/MIN/1.73M2
GLUCOSE SERPL-MCNC: 89 MG/DL (ref 70–108)
HCT VFR BLD AUTO: 45.2 % (ref 37–47)
HGB BLD-MCNC: 14.9 GM/DL (ref 12–16)
IMM GRANULOCYTES # BLD AUTO: 0.02 THOU/MM3 (ref 0–0.07)
IMM GRANULOCYTES NFR BLD AUTO: 0.3 %
LYMPHOCYTES ABSOLUTE: 3.1 THOU/MM3 (ref 1–4.8)
LYMPHOCYTES NFR BLD AUTO: 43.6 %
MCH RBC QN AUTO: 29.1 PG (ref 26–33)
MCHC RBC AUTO-ENTMCNC: 33 GM/DL (ref 32.2–35.5)
MCV RBC AUTO: 88.3 FL (ref 81–99)
MONOCYTES ABSOLUTE: 0.6 THOU/MM3 (ref 0.4–1.3)
MONOCYTES NFR BLD AUTO: 8.5 %
NEUTROPHILS ABSOLUTE: 3.1 THOU/MM3 (ref 1.8–7.7)
NEUTROPHILS NFR BLD AUTO: 44.3 %
NRBC BLD AUTO-RTO: 0 /100 WBC
OSMOLALITY SERPL CALC.SUM OF ELEC: 280.1 MOSMOL/KG (ref 275–300)
PLATELET # BLD AUTO: 277 THOU/MM3 (ref 130–400)
PMV BLD AUTO: 9.3 FL (ref 9.4–12.4)
POTASSIUM SERPL-SCNC: 4 MEQ/L (ref 3.5–5.2)
RBC # BLD AUTO: 5.12 MILL/MM3 (ref 4.2–5.4)
SODIUM SERPL-SCNC: 140 MEQ/L (ref 135–145)
T4 FREE SERPL-MCNC: 1.17 NG/DL (ref 0.93–1.68)
TROPONIN, HIGH SENSITIVITY: < 6 NG/L (ref 0–12)
TSH SERPL DL<=0.005 MIU/L-ACNC: 6.03 UIU/ML (ref 0.4–4.2)
WBC # BLD AUTO: 7 THOU/MM3 (ref 4.8–10.8)

## 2024-10-21 PROCEDURE — 71046 X-RAY EXAM CHEST 2 VIEWS: CPT

## 2024-10-21 PROCEDURE — 85025 COMPLETE CBC W/AUTO DIFF WBC: CPT

## 2024-10-21 PROCEDURE — 80048 BASIC METABOLIC PNL TOTAL CA: CPT

## 2024-10-21 PROCEDURE — 84439 ASSAY OF FREE THYROXINE: CPT

## 2024-10-21 PROCEDURE — 36415 COLL VENOUS BLD VENIPUNCTURE: CPT

## 2024-10-21 PROCEDURE — 84484 ASSAY OF TROPONIN QUANT: CPT

## 2024-10-21 PROCEDURE — 99285 EMERGENCY DEPT VISIT HI MDM: CPT

## 2024-10-21 PROCEDURE — 93005 ELECTROCARDIOGRAM TRACING: CPT | Performed by: EMERGENCY MEDICINE

## 2024-10-21 PROCEDURE — 93010 ELECTROCARDIOGRAM REPORT: CPT | Performed by: NUCLEAR MEDICINE

## 2024-10-21 PROCEDURE — 84443 ASSAY THYROID STIM HORMONE: CPT

## 2024-10-21 ASSESSMENT — PAIN - FUNCTIONAL ASSESSMENT: PAIN_FUNCTIONAL_ASSESSMENT: NONE - DENIES PAIN

## 2024-10-21 NOTE — ED NOTES
Pt resting in bed at this time, pt denies any needs. Call light within reach. Provided patient with water.

## 2024-10-21 NOTE — ED TRIAGE NOTES
Patient presents to the ed with c/o hypertension and increased anxiety. Pt states that she seen pcp this morning and was sent over for further evaluation. Pt denies any chest pain currently. Pt states that she has felt like this x1 week. Tele placed. EKG complete.

## 2024-10-24 NOTE — ED PROVIDER NOTES
tablet by mouth every 6 hours as neededHistorical Med      glimepiride (AMARYL) 2 MG tablet Take 1 tablet by mouth every morning (before breakfast)Historical Med      empagliflozin (JARDIANCE) 10 MG tablet Take 1 tablet by mouth dailyHistorical Med      diclofenac (VOLTAREN) 75 MG EC tablet Historical Med      sodium chloride 0.9 % SOLN 100 mL with Teprotumumab-trbw 500 MG SOLR 10 mg/kg Infuse 10 mg/kg intravenously onceHistorical Med      SITagliptin Phosphate (JANUVIA PO) Take by mouthHistorical Med      sucralfate (CARAFATE) 1 GM tablet Take 1 tablet by mouth 4 times daily, Disp-120 tablet, R-3Normal      lisinopril (PRINIVIL;ZESTRIL) 40 MG tablet Take 1 tablet by mouth dailyHistorical Med      nystatin-triamcinolone (MYCOLOG II) 210994-6.1 UNIT/GM-% cream Apply topically 4 times daily Apply topically 4 times daily. As needed, Topical, 4 TIMES DAILY, Historical Med      clobetasol (TEMOVATE) 0.05 % cream Apply topically 2 times daily Apply topically 2 times daily. As needed, Topical, 2 TIMES DAILY, Historical Med      atorvastatin (LIPITOR) 10 MG tablet Take 1 tablet by mouth dailyHistorical Med      Cholecalciferol (VITAMIN D3) 5000 units TABS Take by mouth nightlyHistorical Med      Omega-3 Fatty Acids (OMEGA-3 CF PO) Take 4 tablets by mouth nightlyHistorical Med      NONFORMULARY as needed Indications: C-Testosterone 2%>PetrolatumHistorical Med      metFORMIN (GLUCOPHAGE) 500 MG tablet Take 4 tablets by mouth daily (with breakfast) 3 tablets dailyHistorical Med      amLODIPine (NORVASC) 5 MG tablet Take 1 tablet by mouth nightly 1/2 tab amHistorical Med      EVENING PRIMROSE OIL PO Take by mouth dailyHistorical Med      calcium carbonate-vitamin D 600-200 MG-UNIT TABS Take 3 tablets by mouth daily.  Historical Med      citalopram (CELEXA) 20 MG tablet Take 1 tablet by mouth dailyHistorical Med      levothyroxine (SYNTHROID) 75 MCG tablet Take 100 mcg by mouth dailyHistorical Med             ALLERGIES     has

## 2024-11-06 NOTE — PROGRESS NOTES
[facility]    Courtney Torres, 54 y.o.  [id number]      Patient of Dr. Nix  Assessment/Plan   1. REJI (obstructive sleep apnea) - resolved  -Reviewed PSG with patient with resolution of REJI. Advised patient of her moderate to severe snore noted on sleep study. Patient reports she does not notice a snore and neither does her . Advised patient if snoring becomes bothersome, could consider referral to ENT. Advised patient to keep weight loss to help prevent recurrence of her sleep apnea. Patient verbalized understanding. Patient to call with any sleep related questions/concerns.    2. Class 2 obesity with body mass index (BMI) of 38.0 to 38.9 in adult, unspecified obesity type, unspecified whether serious comorbidity present  -Patient did gain weight since last visit. She was instructed on weight loss     -She call my office for earlier appointment if needed for worsening of sleep symptoms.   -Courtney was educated about my impression and plan. Patient verbalizes understanding.    Return if symptoms worsen or fail to improve.       Nurse Notes   ESS: 6  SAQLI: 93  Mallampati: 4   Neck Circumference: 15.5  Subjective   Study Results  Original or initial AHI: 12.0     Date of initial study: 8/6/2021       Machine/Mfg:   [x] ResMed    [] Respironics/Dreamstation   Interface:   [] Nasal    [] Nasal pillows   [x] FFM        Provider:      [x] [facility]     [][employer]     [] [employer]    [] [facility]    [] [facility]                           [] [facility]      [] [employer]    [] [place]:      [] Other    Repeat Study Date -  10/9/24  AHI -  3.7    Total Events - 24  (Apneas  1  Hypopneas 23  Central  0)  LM w/Arousals - 0  Sleep Efficiency - 87.3 % (Total Sleep Time - 385.5 min)  Time with Sats below 88% - 1.5 min  Interval History       Courtney Torres is a 54 y.o. old female who comes in to review the results of her recent sleep study, to answer questions and to explore

## 2024-11-07 ENCOUNTER — OFFICE VISIT (OUTPATIENT)
Dept: PULMONOLOGY | Age: 54
End: 2024-11-07
Payer: COMMERCIAL

## 2024-11-07 VITALS
BODY MASS INDEX: 37.8 KG/M2 | HEART RATE: 69 BPM | HEIGHT: 64 IN | WEIGHT: 221.4 LBS | TEMPERATURE: 98 F | DIASTOLIC BLOOD PRESSURE: 82 MMHG | OXYGEN SATURATION: 99 % | SYSTOLIC BLOOD PRESSURE: 140 MMHG

## 2024-11-07 DIAGNOSIS — E66.812 CLASS 2 OBESITY WITH BODY MASS INDEX (BMI) OF 38.0 TO 38.9 IN ADULT, UNSPECIFIED OBESITY TYPE, UNSPECIFIED WHETHER SERIOUS COMORBIDITY PRESENT: ICD-10-CM

## 2024-11-07 DIAGNOSIS — G47.33 OSA (OBSTRUCTIVE SLEEP APNEA): Primary | ICD-10-CM

## 2024-11-07 PROCEDURE — G8417 CALC BMI ABV UP PARAM F/U: HCPCS

## 2024-11-07 PROCEDURE — 3017F COLORECTAL CA SCREEN DOC REV: CPT

## 2024-11-07 PROCEDURE — 1036F TOBACCO NON-USER: CPT

## 2024-11-07 PROCEDURE — G8427 DOCREV CUR MEDS BY ELIG CLIN: HCPCS

## 2024-11-07 PROCEDURE — 99212 OFFICE O/P EST SF 10 MIN: CPT

## 2024-11-07 PROCEDURE — G8484 FLU IMMUNIZE NO ADMIN: HCPCS

## 2024-11-07 RX ORDER — GLIPIZIDE 10 MG/1
10 TABLET, FILM COATED, EXTENDED RELEASE ORAL DAILY
COMMUNITY
Start: 2024-10-01

## 2024-11-07 ASSESSMENT — ENCOUNTER SYMPTOMS
RHINORRHEA: 1
RESPIRATORY NEGATIVE: 1

## 2025-01-22 ENCOUNTER — HOSPITAL ENCOUNTER (OUTPATIENT)
Age: 55
Discharge: HOME | End: 2025-01-22
Payer: COMMERCIAL

## 2025-01-22 PROCEDURE — 36415 COLL VENOUS BLD VENIPUNCTURE: CPT

## 2025-01-22 PROCEDURE — 84443 ASSAY THYROID STIM HORMONE: CPT

## 2025-01-22 PROCEDURE — 84439 ASSAY OF FREE THYROXINE: CPT

## 2025-01-23 LAB
T4 FREE SERPL-MCNC: 1.4 NG/DL (ref 0.93–1.68)
TSH SERPL DL<=0.005 MIU/L-ACNC: 0.11 UIU/ML (ref 0.4–4.2)

## 2025-02-27 ENCOUNTER — HOSPITAL ENCOUNTER (OUTPATIENT)
Age: 55
Discharge: HOME | End: 2025-02-27
Payer: COMMERCIAL

## 2025-02-27 LAB
T4 FREE SERPL-MCNC: 1 NG/DL (ref 0.92–1.68)
TSH SERPL DL<=0.05 MIU/L-ACNC: 7.98 UIU/ML (ref 0.27–4.2)

## 2025-02-27 PROCEDURE — 84439 ASSAY OF FREE THYROXINE: CPT

## 2025-02-27 PROCEDURE — 84443 ASSAY THYROID STIM HORMONE: CPT

## 2025-02-27 PROCEDURE — 36415 COLL VENOUS BLD VENIPUNCTURE: CPT

## 2025-03-06 ENCOUNTER — HOSPITAL ENCOUNTER (OUTPATIENT)
Age: 55
Discharge: HOME | End: 2025-03-06
Payer: COMMERCIAL

## 2025-03-06 LAB
ALBUMIN SERPL BCG-MCNC: 4.1 G/DL (ref 3.4–4.9)
ALP SERPL-CCNC: 74 U/L (ref 35–104)
ALT SERPL W/O P-5'-P-CCNC: 21 U/L (ref 10–35)
ANION GAP SERPL CALC-SCNC: 12 MEQ/L (ref 8–16)
AST SERPL-CCNC: 24 U/L (ref 10–35)
BILIRUB SERPL-MCNC: 0.8 MG/DL (ref 0.3–1.2)
BUN SERPL-MCNC: 15 MG/DL (ref 8–23)
CALCIUM SERPL-MCNC: 9.6 MG/DL (ref 8.6–10)
CHLORIDE SERPL-SCNC: 104 MEQ/L (ref 98–111)
CHOLEST SERPL-MCNC: 165 MG/DL (ref 100–199)
CO2 SERPL-SCNC: 25 MEQ/L (ref 22–29)
CREAT SERPL-MCNC: 0.8 MG/DL (ref 0.5–0.9)
DEPRECATED MEAN GLUCOSE BLD GHB EST-ACNC: 123 MG/DL (ref 70–126)
GFR SERPL CREATININE-BSD FRML MDRD: 86 ML/MIN/1.73M2
GLUCOSE SERPL-MCNC: 115 MG/DL (ref 74–109)
HBA1C MFR BLD HPLC: 6.1 % (ref 4–6)
HDLC SERPL-MCNC: 91 MG/DL
LDLC SERPL CALC-MCNC: 54 MG/DL
POTASSIUM SERPL-SCNC: 4.4 MEQ/L (ref 3.5–5.2)
PROT SERPL-MCNC: 7.2 G/DL (ref 6.4–8.3)
SODIUM SERPL-SCNC: 141 MEQ/L (ref 135–145)
TRIGL SERPL-MCNC: 100 MG/DL (ref 0–199)

## 2025-03-06 PROCEDURE — 83036 HEMOGLOBIN GLYCOSYLATED A1C: CPT

## 2025-03-06 PROCEDURE — 80061 LIPID PANEL: CPT

## 2025-03-06 PROCEDURE — 36415 COLL VENOUS BLD VENIPUNCTURE: CPT

## 2025-03-06 PROCEDURE — 80053 COMPREHEN METABOLIC PANEL: CPT

## 2025-05-12 ENCOUNTER — TRANSCRIBE ORDERS (OUTPATIENT)
Dept: ADMINISTRATIVE | Age: 55
End: 2025-05-12

## 2025-05-12 DIAGNOSIS — Z12.31 ENCOUNTER FOR SCREENING MAMMOGRAM FOR MALIGNANT NEOPLASM OF BREAST: Primary | ICD-10-CM

## 2025-05-15 ENCOUNTER — HOSPITAL ENCOUNTER (OUTPATIENT)
Age: 55
Discharge: HOME | End: 2025-05-15
Payer: COMMERCIAL

## 2025-05-15 LAB
T4 FREE SERPL-MCNC: 1.2 NG/DL (ref 0.92–1.68)
TSH SERPL DL<=0.05 MIU/L-ACNC: 3.12 UIU/ML (ref 0.27–4.2)

## 2025-05-15 PROCEDURE — 84439 ASSAY OF FREE THYROXINE: CPT

## 2025-05-15 PROCEDURE — 84443 ASSAY THYROID STIM HORMONE: CPT

## 2025-05-15 PROCEDURE — 36415 COLL VENOUS BLD VENIPUNCTURE: CPT

## 2025-06-03 ENCOUNTER — HOSPITAL ENCOUNTER (OUTPATIENT)
Age: 55
Discharge: HOME | End: 2025-06-03
Payer: COMMERCIAL

## 2025-06-03 LAB
ALBUMIN SERPL BCG-MCNC: 3.9 G/DL (ref 3.4–4.9)
ALP SERPL-CCNC: 75 U/L (ref 38–126)
ALT SERPL W/O P-5'-P-CCNC: 21 U/L (ref 10–35)
ANION GAP SERPL CALC-SCNC: 10 MEQ/L (ref 8–16)
AST SERPL-CCNC: 19 U/L (ref 10–35)
BILIRUB SERPL-MCNC: 0.6 MG/DL (ref 0.3–1.2)
BUN SERPL-MCNC: 20 MG/DL (ref 8–23)
CALCIUM SERPL-MCNC: 9.1 MG/DL (ref 8.6–10)
CHLORIDE SERPL-SCNC: 106 MEQ/L (ref 98–111)
CHOLEST SERPL-MCNC: 141 MG/DL (ref 100–199)
CO2 SERPL-SCNC: 25 MEQ/L (ref 22–29)
CREAT SERPL-MCNC: 0.7 MG/DL (ref 0.5–0.9)
DEPRECATED MEAN GLUCOSE BLD GHB EST-ACNC: 135 MG/DL (ref 70–126)
GFR SERPL CREATININE-BSD FRML MDRD: > 90 ML/MIN/1.73M2
GLUCOSE SERPL-MCNC: 137 MG/DL (ref 74–109)
HBA1C MFR BLD HPLC: 6.5 % (ref 4–6)
HDLC SERPL-MCNC: 80 MG/DL
LDLC SERPL CALC-MCNC: 42 MG/DL
POTASSIUM SERPL-SCNC: 4.2 MEQ/L (ref 3.5–5.2)
PROT SERPL-MCNC: 6.6 G/DL (ref 6.4–8.3)
SODIUM SERPL-SCNC: 141 MEQ/L (ref 135–145)
TRIGL SERPL-MCNC: 93 MG/DL (ref 0–199)
TSH SERPL DL<=0.05 MIU/L-ACNC: 5.07 UIU/ML (ref 0.27–4.2)

## 2025-06-03 PROCEDURE — 84443 ASSAY THYROID STIM HORMONE: CPT

## 2025-06-03 PROCEDURE — 80061 LIPID PANEL: CPT

## 2025-06-03 PROCEDURE — 83036 HEMOGLOBIN GLYCOSYLATED A1C: CPT

## 2025-06-03 PROCEDURE — 80053 COMPREHEN METABOLIC PANEL: CPT

## 2025-06-03 PROCEDURE — 36415 COLL VENOUS BLD VENIPUNCTURE: CPT

## 2025-06-06 ENCOUNTER — OFFICE VISIT (OUTPATIENT)
Dept: NEUROLOGY | Age: 55
End: 2025-06-06
Payer: COMMERCIAL

## 2025-06-06 VITALS
HEIGHT: 64 IN | WEIGHT: 234.38 LBS | DIASTOLIC BLOOD PRESSURE: 82 MMHG | BODY MASS INDEX: 40.01 KG/M2 | HEART RATE: 56 BPM | SYSTOLIC BLOOD PRESSURE: 140 MMHG

## 2025-06-06 DIAGNOSIS — G35 MULTIPLE SCLEROSIS (HCC): Primary | ICD-10-CM

## 2025-06-06 DIAGNOSIS — R29.898 RIGHT LEG WEAKNESS: ICD-10-CM

## 2025-06-06 PROCEDURE — 3017F COLORECTAL CA SCREEN DOC REV: CPT | Performed by: NURSE PRACTITIONER

## 2025-06-06 PROCEDURE — 1036F TOBACCO NON-USER: CPT | Performed by: NURSE PRACTITIONER

## 2025-06-06 PROCEDURE — G8417 CALC BMI ABV UP PARAM F/U: HCPCS | Performed by: NURSE PRACTITIONER

## 2025-06-06 PROCEDURE — 99213 OFFICE O/P EST LOW 20 MIN: CPT | Performed by: NURSE PRACTITIONER

## 2025-06-06 PROCEDURE — G8427 DOCREV CUR MEDS BY ELIG CLIN: HCPCS | Performed by: NURSE PRACTITIONER

## 2025-06-06 NOTE — PATIENT INSTRUCTIONS
Continue with Primrose oil, vitamin D and calcium.  Report any new symptoms or concerns.   Follow up in 12 months or sooner if needed  Call if any questions or concerns or new symptoms

## 2025-06-06 NOTE — PROGRESS NOTES
NEUROLOGY OUT PATIENT FOLLOW UP NOTE:  6/6/20258:32 AM    Courtney Torres is here for follow up for multiple sclerosis.          No Known Allergies    Current Outpatient Medications:     glipiZIDE (GLUCOTROL XL) 10 MG extended release tablet, Take 1 tablet by mouth daily, Disp: , Rfl:     tiZANidine (ZANAFLEX) 2 MG tablet, Take 1 tablet by mouth every 6 hours as needed (Patient not taking: Reported on 10/2/2024), Disp: , Rfl:     glimepiride (AMARYL) 2 MG tablet, Take 1 tablet by mouth every morning (before breakfast), Disp: , Rfl:     empagliflozin (JARDIANCE) 10 MG tablet, Take 1 tablet by mouth daily, Disp: , Rfl:     diclofenac (VOLTAREN) 75 MG EC tablet, , Disp: , Rfl:     sodium chloride 0.9 % SOLN 100 mL with Teprotumumab-trbw 500 MG SOLR 10 mg/kg, Infuse 10 mg/kg intravenously once, Disp: , Rfl:     SITagliptin Phosphate (JANUVIA PO), Take by mouth, Disp: , Rfl:     sucralfate (CARAFATE) 1 GM tablet, Take 1 tablet by mouth 4 times daily (Patient not taking: Reported on 10/2/2024), Disp: 120 tablet, Rfl: 3    lisinopril (PRINIVIL;ZESTRIL) 40 MG tablet, Take 1 tablet by mouth daily, Disp: , Rfl:     nystatin-triamcinolone (MYCOLOG II) 909901-0.1 UNIT/GM-% cream, Apply topically 4 times daily Apply topically 4 times daily. As needed, Disp: , Rfl:     clobetasol (TEMOVATE) 0.05 % cream, Apply topically 2 times daily Apply topically 2 times daily. As needed, Disp: , Rfl:     atorvastatin (LIPITOR) 10 MG tablet, Take 1 tablet by mouth daily, Disp: , Rfl:     Cholecalciferol (VITAMIN D3) 5000 units TABS, Take by mouth nightly, Disp: , Rfl:     Omega-3 Fatty Acids (OMEGA-3 CF PO), Take 4 tablets by mouth nightly, Disp: , Rfl:     NONFORMULARY, as needed Indications: C-Testosterone 2%>Petrolatum (Patient not taking: Reported on 10/2/2024), Disp: , Rfl:     metFORMIN (GLUCOPHAGE) 500 MG tablet, Take 4 tablets by mouth daily (with breakfast) 3 tablets daily, Disp: , Rfl:     amLODIPine (NORVASC) 5 MG tablet, Take 1

## 2025-07-09 ENCOUNTER — HOSPITAL ENCOUNTER (OUTPATIENT)
Dept: WOMENS IMAGING | Age: 55
Discharge: HOME | End: 2025-07-09
Attending: OBSTETRICS & GYNECOLOGY
Payer: COMMERCIAL

## 2025-07-09 VITALS — WEIGHT: 234 LBS | BODY MASS INDEX: 40.17 KG/M2

## 2025-07-09 DIAGNOSIS — Z12.31 ENCOUNTER FOR SCREENING MAMMOGRAM FOR MALIGNANT NEOPLASM OF BREAST: ICD-10-CM

## 2025-07-09 PROCEDURE — 77063 BREAST TOMOSYNTHESIS BI: CPT

## 2025-07-28 ENCOUNTER — TRANSCRIBE ORDERS (OUTPATIENT)
Dept: ADMINISTRATIVE | Age: 55
End: 2025-07-28

## 2025-07-28 DIAGNOSIS — M19.071 PRIMARY OSTEOARTHRITIS, RIGHT ANKLE AND FOOT: Primary | ICD-10-CM

## 2025-08-11 ENCOUNTER — HOSPITAL ENCOUNTER (OUTPATIENT)
Dept: MRI IMAGING | Age: 55
Discharge: HOME | End: 2025-08-11
Attending: PODIATRIST
Payer: COMMERCIAL

## 2025-08-11 DIAGNOSIS — M19.071 PRIMARY OSTEOARTHRITIS, RIGHT ANKLE AND FOOT: ICD-10-CM

## 2025-08-11 PROCEDURE — 73718 MRI LOWER EXTREMITY W/O DYE: CPT

## (undated) DEVICE — GLOVE ORANGE PI 7 1/2   MSG9075

## (undated) DEVICE — CATHETER,URETHRAL,REDRUBBER,STRL,14FR: Brand: MEDLINE INDUSTRIES, INC.

## (undated) DEVICE — PREP SOL PVP IODINE 4%  4 OZ/BTL

## (undated) DEVICE — TOWEL,OR,DSP,ST,BLUE,DLX,4/PK,20PK/CS: Brand: MEDLINE

## (undated) DEVICE — AIRSEAL 8 MM ACCESS PORT AND LOW PROFILE OBTURATOR WITH BLADELESS OPTICAL TIP, 120 MM LENGTH: Brand: AIRSEAL

## (undated) DEVICE — PACK,SET UP,NO DRAPES: Brand: MEDLINE

## (undated) DEVICE — GLOVE ORANGE PI 7   MSG9070

## (undated) DEVICE — TUBING, SUCTION, 1/4" X 12', STRAIGHT: Brand: MEDLINE

## (undated) DEVICE — PAD,SANITARY,11 IN,MAXI,W/WINGS,N-STRL: Brand: MEDLINE

## (undated) DEVICE — TIP COVER ACCESSORY

## (undated) DEVICE — SEAL CANN DIA8.5-13MM ENDOWRIST DA VINCI SI

## (undated) DEVICE — GLOVE SURG SZ 65 THK91MIL LTX FREE SYN POLYISOPRENE

## (undated) DEVICE — SOLUTION IV 1000ML 0.9% SOD CHL PH 5 INJ USP VIAFLX PLAS

## (undated) DEVICE — SURE SET SINGLE BASIN-LF: Brand: MEDLINE INDUSTRIES, INC.

## (undated) DEVICE — STERILE COTTON BALLS LARGE 5/P: Brand: MEDLINE

## (undated) DEVICE — SUTURE VCRL SZ 0 L27IN ABSRB VLT L26MM CT-2 1/2 CIR J334H

## (undated) DEVICE — Y-TYPE TUR/BLADDER IRRIGATION SET, REGULATING CLAMP

## (undated) DEVICE — SET GRAV VENT NVENT CK VLV 3 NDL FREE PRT 10 GTT

## (undated) DEVICE — TRI-LUMEN FILTERED TUBE SET WITH ACTIVATED CHARCOAL FILTER: Brand: AIRSEAL

## (undated) DEVICE — 3M™ STERI-STRIP™ COMPOUND BENZOIN TINCTURE 40 BAGS/CARTON 4 CARTONS/CASE C1544: Brand: 3M™ STERI-STRIP™

## (undated) DEVICE — PAD,NON-ADHERENT,3X8,STERILE,LF,1/PK: Brand: MEDLINE

## (undated) DEVICE — SUTURE VCRL SZ 0 L27IN ABSRB UD L36MM CT-1 1/2 CIR J260H

## (undated) DEVICE — 3M™ WARMING BLANKET, UPPER BODY, 10 PER CASE, 42268: Brand: BAIR HUGGER™

## (undated) DEVICE — GOWN,SIRUS,NONRNF,SETINSLV,XL,20/CS: Brand: MEDLINE

## (undated) DEVICE — Z DISCONTINUED BY MEDLINE USE 2711682 TRAY SKIN PREP DRY W/ PREM GLV

## (undated) DEVICE — DRAPE,UNDERBUTTOCKS,PCH,STERILE: Brand: MEDLINE

## (undated) DEVICE — PACK PROCEDURE SURG GYN ROBOTIC

## (undated) DEVICE — STRIP,CLOSURE,WOUND,MEDI-STRIP,1/2X4: Brand: MEDLINE

## (undated) DEVICE — ELECTRO LUBE IS A SINGLE PATIENT USE DEVICE THAT IS INTENDED TO BE USED ON ELECTROSURGICAL ELECTRODES TO REDUCE STICKING.: Brand: KEY SURGICAL ELECTRO LUBE

## (undated) DEVICE — OBTURATOR ROBOTIC DIA8MM BLDELSS ENDOSCP DISP DA VINCI SI

## (undated) DEVICE — GARMENT,MEDLINE,DVT,INT,CALF,MED, GEN2: Brand: MEDLINE

## (undated) DEVICE — BANDAGE ADH W1XL3IN NAT FAB WVN FLX DURABLE N ADH PD SEAL

## (undated) DEVICE — GLOVE SURG SZ 7 L12IN FNGR THK79MIL GRN LTX FREE

## (undated) DEVICE — KIT DRP 3 ARM ACC DISP ENDOWRIST DA VINCI SI

## (undated) DEVICE — GLOVE SURG SZ 8 L11.77IN FNGR THK9.8MIL STRW LTX POLYMER

## (undated) DEVICE — GOWN,SIRUS,NON REINFRCD,LARGE,SET IN SL: Brand: MEDLINE

## (undated) DEVICE — SUTURE VCRL SZ 4-0 L27IN ABSRB UD L19MM FS-2 3/8 CIR REV J422H

## (undated) DEVICE — GAUZE,SPONGE,8"X4",12PLY,XRAY,STRL,LF: Brand: MEDLINE

## (undated) DEVICE — CONMED DISPOSABLE BIPOLAR CABLE, 10' (3.05M): Brand: CONMED

## (undated) DEVICE — VCARE MEDIUM, UTERINE MANIPULATOR, VAGINAL-CERVICAL-AHLUWALIA'S-RETRACTOR-ELEVATOR: Brand: VCARE

## (undated) DEVICE — UNIVERSAL MONOPOLAR LAPAROSCOPIC CABLE 10FT, 4MM PIN CONNECTOR: Brand: CONMED

## (undated) DEVICE — PACK PROCEDURE SURG PLAS SC MIN SRHP LF

## (undated) DEVICE — YANKAUER,BULB TIP,W/O VENT,RIGID,STERILE: Brand: MEDLINE

## (undated) DEVICE — SPONGE GZ W4XL4IN COT 12 PLY TYP VII WVN C FLD DSGN